# Patient Record
Sex: MALE | Race: WHITE | NOT HISPANIC OR LATINO | Employment: FULL TIME | URBAN - METROPOLITAN AREA
[De-identification: names, ages, dates, MRNs, and addresses within clinical notes are randomized per-mention and may not be internally consistent; named-entity substitution may affect disease eponyms.]

---

## 2017-01-04 ENCOUNTER — OFFICE VISIT (OUTPATIENT)
Dept: CARDIOLOGY | Facility: MEDICAL CENTER | Age: 57
End: 2017-01-04
Payer: COMMERCIAL

## 2017-01-04 VITALS
HEART RATE: 76 BPM | SYSTOLIC BLOOD PRESSURE: 120 MMHG | BODY MASS INDEX: 33.07 KG/M2 | WEIGHT: 231 LBS | DIASTOLIC BLOOD PRESSURE: 82 MMHG | HEIGHT: 70 IN

## 2017-01-04 DIAGNOSIS — F41.9 ANXIETY: ICD-10-CM

## 2017-01-04 DIAGNOSIS — E66.9 OBESITY (BMI 30-39.9): ICD-10-CM

## 2017-01-04 DIAGNOSIS — I25.10 CORONARY ARTERY DISEASE INVOLVING NATIVE CORONARY ARTERY OF NATIVE HEART WITHOUT ANGINA PECTORIS: Chronic | ICD-10-CM

## 2017-01-04 DIAGNOSIS — E78.2 MIXED HYPERLIPIDEMIA: ICD-10-CM

## 2017-01-04 PROCEDURE — 99214 OFFICE O/P EST MOD 30 MIN: CPT | Performed by: INTERNAL MEDICINE

## 2017-01-04 RX ORDER — LOSARTAN POTASSIUM 25 MG/1
25 TABLET ORAL DAILY
Qty: 30 TAB | Refills: 11 | Status: SHIPPED | OUTPATIENT
Start: 2017-01-04 | End: 2017-02-21 | Stop reason: SDUPTHER

## 2017-01-04 NOTE — PROGRESS NOTES
Subjective:   Yves Ivory is a 55 y.o. male who presents today for all up after STEMI complicated by Arrest.    Hx of anxiety, HLD, obesity, HTN, anxiety, depression, smoking hx, and now CAD s/p STEMI with DENISSE to OM, VF arrest.    He is presented to the emergency department a couple of times for musculoskeletal chest pain and gas pains one of which and left with a stress test showing infarct no ischemia and a repeat echo which was unchanged and normal.    He is active and we discussed many insightful questions today.    He has had his Lipitor cut in half for myalgias, and was taken off his lisinopril for cough but not did not have it replaced by appropriate replacement medication. By his PCP.    He has had no episodes of chest pain, shortness of breath, orthopnea, or peripheral edema.    Past Medical History   Diagnosis Date   • Hyperlipidemia    • Obesity    • Hypertension    • CAD (coronary artery disease)      STEMI-S/P DENISSE to OM   • Depression    • Anxiety    • Ventricular tachycardia (HCC)      post STEMI      Past Surgical History   Procedure Laterality Date   • Cardiac cath     • Angioplasty     • Appendectomy child       No family history on file.  History   Smoking status   • Current Every Day Smoker -- 40 years   • Types: Cigars   Smokeless tobacco   • Never Used     Allergies   Allergen Reactions   • Brilinta [Ticagrelor] Shortness of Breath     Med put patient in ER twice     Outpatient Encounter Prescriptions as of 1/4/2017   Medication Sig Dispense Refill   • Coenzyme Q10 (CO Q 10 PO) Take  by mouth every day.     • losartan (COZAAR) 25 MG Tab Take 1 Tab by mouth every day. 30 Tab 11   • lorazepam (ATIVAN) 1 MG Tab Take 1 Tab by mouth 2 times a day as needed for Anxiety. 60 Tab 2   • ranitidine (ZANTAC) 150 MG Tab Take 1 Tab by mouth 2 times a day. 60 Tab 3   • magnesium oxide (MAG-OX) 400 (241.3 MG) MG Tab tablet Take 1 Tab by mouth every day. 15 Tab 0   • acetaminophen (TYLENOL) 500 MG Tab Take  "500-1,000 mg by mouth every 6 hours as needed.     • atorvastatin (LIPITOR) 80 MG tablet Take 40 mg by mouth every evening.     • metoprolol (LOPRESSOR) 25 MG Tab Take 0.5 Tabs by mouth 2 times a day. 90 Tab 3   • [DISCONTINUED] lisinopril (PRINIVIL) 5 MG Tab Take 1 Tab by mouth every day. 90 Tab 3   • clopidogrel (PLAVIX) 75 MG Tab Take 1 Tab by mouth every day. 90 Tab 3   • aspirin EC 81 MG EC tablet Take 1 Tab by mouth every day. 30 Tab 0     No facility-administered encounter medications on file as of 1/4/2017.     ROS     Objective:   /82 mmHg  Pulse 76  Ht 1.778 m (5' 10\")  Wt 104.781 kg (231 lb)  BMI 33.15 kg/m2    Physical Exam   Constitutional: He is oriented to person, place, and time. He appears well-developed and well-nourished. No distress.   HENT:   Head: Normocephalic and atraumatic.   Mouth/Throat: Oropharynx is clear and moist.   Eyes: Conjunctivae and EOM are normal. Pupils are equal, round, and reactive to light. No scleral icterus.   Neck: Normal range of motion. No JVD present. No tracheal deviation present. No thyromegaly present.   Cardiovascular: Normal rate, regular rhythm, S1 normal, normal heart sounds and intact distal pulses.  PMI is not displaced.  Exam reveals no gallop and no friction rub.    No murmur heard.  Pulses:       Carotid pulses are 2+ on the right side, and 2+ on the left side.       Radial pulses are 2+ on the right side, and 2+ on the left side.        Dorsalis pedis pulses are 2+ on the right side, and 2+ on the left side.        Posterior tibial pulses are 2+ on the right side, and 2+ on the left side.   Pulmonary/Chest: Effort normal and breath sounds normal. No respiratory distress. He has no wheezes. He has no rales.   Abdominal: Soft. Bowel sounds are normal. He exhibits no distension and no pulsatile midline mass. There is no tenderness. There is no guarding.   Musculoskeletal: Normal range of motion. He exhibits no edema.   Neurological: He is alert and " oriented to person, place, and time. No cranial nerve deficit.   Skin: Skin is warm and dry. No rash noted. He is not diaphoretic. No erythema.   Psychiatric: His behavior is normal. Thought content normal. His mood appears anxious.   Jittery and restless in exam   Nursing note and vitals reviewed.    7/7/16 ANGIOGRAM POSTPROCEDURE DIAGNOSES:  1.  Coronary artery disease including occluded proximal obtuse marginal    branch, additional nonobstructive mid left anterior descending artery    stenosis.  2.  Successful thrombectomy/PTCA/stent placement of the proximal to mid obtuse   marginal branch with 3.0x32-mm Promus PREMIER drug-eluting stent.  3.  Angio-Seal closure.    7/8/16 ECHO CONCLUSIONS  Left ventricular ejection fraction is visually estimated to be 55%,   inferior hypokinesis.  Grade II-III diastolic dysfunction.  Mild concentric left ventricular   hypertrophy.  Dilated inferior vena cava with inspiratory collapse.  No significant valve disease or flow abnormalities.   No prior study is available for comparison.     Lab Results   Component Value Date/Time    WBC 8.6 10/06/2016 02:20 PM    RBC 5.26 10/06/2016 02:20 PM    HEMOGLOBIN 16.0 10/06/2016 02:20 PM    HEMATOCRIT 45.9 10/06/2016 02:20 PM    MCV 87.3 10/06/2016 02:20 PM    MCH 30.4 10/06/2016 02:20 PM    MCHC 34.9 10/06/2016 02:20 PM    MPV 9.6 10/06/2016 02:20 PM      Lab Results   Component Value Date/Time    CHOLESTEROL, 08/17/2016 07:09 AM    LDL 71 08/17/2016 07:09 AM    HDL 40 08/17/2016 07:09 AM    TRIGLYCERIDES 164* 08/17/2016 07:09 AM       Lab Results   Component Value Date/Time    SODIUM 138 10/06/2016 02:20 PM    POTASSIUM 3.7 10/06/2016 02:20 PM    CHLORIDE 104 10/06/2016 02:20 PM    CO2 22 10/06/2016 02:20 PM    GLUCOSE 90 10/06/2016 02:20 PM    BUN 18 10/06/2016 02:20 PM    CREATININE 0.78 10/06/2016 02:20 PM     Lab Results   Component Value Date/Time    ALKALINE PHOSPHATASE 67 10/06/2016 02:20 PM    AST(SGOT) 21 10/06/2016 02:20  PM    ALT(SGPT) 49 10/06/2016 02:20 PM    TOTAL BILIRUBIN 1.0 10/06/2016 02:20 PM      Lab Results   Component Value Date/Time    B NATRIURETIC PEPTIDE 6 10/06/2016 02:20 PM      Assessment:     1. CAD s/p DENISSE OM 7/7/2016  losartan (COZAAR) 25 MG Tab    STEMI  VF Arrest   2. Mixed hyperlipidemia     3. Anxiety     4. Obesity (BMI 30-39.9)         Medical Decision Making:  Today's Assessment / Status / Plan:     Doing well. No cardiac symptoms. Mild residual nonobstructive CAD. Needs optimal medical therapy. He's gained weight. Recommend diet and lifestyle for 3 months and if he makes good progress a check of his lipid profile. Recommend an ARB exacerbation for his ACEI due to perivascular benefits after MI. He needs one year of dual antiplatelet therapy. He is tolerating this well.    1. Losartan 25 mg daily  2. Continue other medical therapy  3. Follow-up in 3 months with Alix Khan with a repeat lipid profile  4. Diet activity and lifestyle changes were discussed  5. Anxiety management

## 2017-01-04 NOTE — MR AVS SNAPSHOT
"        Yves Ivory   2017 7:45 AM   Office Visit   MRN: [id number]    Department:  [facility]   Dept Phone:  [phone]    Description:  Male : 1960   Provider:  Federico Bianchi M.D.           Reason for Visit     Follow-Up           Allergies as of 2017     Allergen Noted Reactions    Brilinta [Ticagrelor] 2016   Shortness of Breath    Med put patient in ER twice      You were diagnosed with     Coronary artery disease involving native coronary artery of native heart without angina pectoris   [6004842]   STEMI  VF Arrest    Mixed hyperlipidemia   [272.2.ICD-9-CM]       Anxiety   [580074]       Obesity (BMI 30-39.9)   [921442]         Vital Signs     Blood Pressure Pulse Height Weight Body Mass Index Smoking Status    120/82 mmHg 76 1.778 m (5' 10\") 104.781 kg (231 lb) 33.15 kg/m2 Current Every Day Smoker      Basic Information     Date Of Birth Sex Race Ethnicity Preferred Language    1960 Male White Non- English      Problem List              ICD-10-CM Priority Class Noted - Resolved    STEMI (ST elevation myocardial infarction) (HCC) I21.3 Medium  2016 - Present    Research study patient Z00.6 Low  2016 - Present    Coronary artery disease involving native coronary artery of native heart without angina pectoris I25.10 Medium  2016 - Present    HLD (hyperlipidemia) E78.5 Medium  2016 - Present    VT (ventricular tachycardia) ([facility]) I47.2 Medium  2016 - Present    S/P coronary artery stent placement Z95.5 Medium  2016 - Present    Cigar smoker F17.290 Low  2016 - Present    Anxiety F41.9 Low  2016 - Present    Chest pain R07.9   10/6/2016 - Present    Abdominal gas pain R14.1   2016 - Present    Preventative health care Z00.00   2016 - Present    Obesity (BMI 30-39.9) E66.9   2016 - Present    Cough R05   2016 - Present      Health Maintenance        Date Due Completion Dates    IMM DTaP/Tdap/Td " Vaccine (1 - Tdap) 7/28/1981 ---    IMM PNEUMOCOCCAL 19-64 (ADULT) MEDIUM RISK SERIES (1 of 1 - PPSV23) 7/28/1981 ---    COLONOSCOPY 7/28/2012 ---            Current Immunizations     Influenza Vaccine Quad Inj (Pf) 10/3/2016      Below and/or attached are the medications your provider expects you to take. Review all of your home medications and newly ordered medications with your provider and/or pharmacist. Follow medication instructions as directed by your provider and/or pharmacist. Please keep your medication list with you and share with your provider. Update the information when medications are discontinued, doses are changed, or new medications (including over-the-counter products) are added; and carry medication information at all times in the event of emergency situations     Allergies:  BRILINTA - Shortness of Breath               Medications  Valid as of: January 04, 2017 -  8:18 AM    Generic Name Brand Name Tablet Size Instructions for use    Acetaminophen (Tab) TYLENOL 500 MG Take 500-1,000 mg by mouth every 6 hours as needed.        Aspirin (Tablet Delayed Response) aspirin 81 MG Take 1 Tab by mouth every day.        Atorvastatin Calcium (Tab) LIPITOR 80 MG Take 40 mg by mouth every evening.        Clopidogrel Bisulfate (Tab) PLAVIX 75 MG Take 1 Tab by mouth every day.        Coenzyme Q10   Take  by mouth every day.        LORazepam (Tab) ATIVAN 1 MG Take 1 Tab by mouth 2 times a day as needed for Anxiety.        Losartan Potassium (Tab) COZAAR 25 MG Take 1 Tab by mouth every day.        Magnesium Oxide (Tab) MAG- (241.3 MG) MG Take 1 Tab by mouth every day.        Metoprolol Tartrate (Tab) LOPRESSOR 25 MG Take 0.5 Tabs by mouth 2 times a day.        RaNITidine HCl (Tab) ZANTAC 150 MG Take 1 Tab by mouth 2 times a day.        .                 Medicines prescribed today were sent to:     [id number] - [place], [place] - [address] [address]    [address] [place] [address] [place] [address]    Phone:  [phone] Fax: [phone]    Open 24 Hours?: No      Medication refill instructions:       If your prescription bottle indicates you have medication refills left, it is not necessary to call your provider’s office. Please contact your pharmacy and they will refill your medication.    If your prescription bottle indicates you do not have any refills left, you may request refills at any time through one of the following ways: The online [employer] system (except Urgent Care), by calling your provider’s office, or by asking your pharmacy to contact your provider’s office with a refill request. Medication refills are processed only during regular business hours and may not be available until the next business day. Your provider may request additional information or to have a follow-up visit with you prior to refilling your medication.   *Please Note: Medication refills are assigned a new Rx number when refilled electronically. Your pharmacy may indicate that no refills were authorized even though a new prescription for the same medication is available at the pharmacy. Please request the medicine by name with the pharmacy before contacting your provider for a refill.           [employer] Access Code: Activation code not generated  Current [employer] Status: Active

## 2017-01-06 ENCOUNTER — TELEPHONE (OUTPATIENT)
Dept: CARDIOLOGY | Facility: MEDICAL CENTER | Age: 57
End: 2017-01-06

## 2017-01-06 NOTE — TELEPHONE ENCOUNTER
Patient saw Dr. Bianchi yesterday 1/4/17.  He is being scheduled for a colonoscopy  And needs surgery clearance.  [facility] Clear[facility].  Patient may hold his Plavix 5 days prior but may NOT stop aspirin per Dr. Bianchi.  Surgical clearance letter in chart. david

## 2017-01-30 DIAGNOSIS — R14.1 ABDOMINAL GAS PAIN: ICD-10-CM

## 2017-01-30 RX ORDER — RANITIDINE 150 MG/1
150 TABLET ORAL 2 TIMES DAILY
Qty: 180 TAB | Refills: 0 | Status: SHIPPED | OUTPATIENT
Start: 2017-01-30 | End: 2017-04-05 | Stop reason: SDUPTHER

## 2017-01-30 NOTE — TELEPHONE ENCOUNTER
Was the patient seen in the last year in this department? Yes     Does patient have an active prescription for medications requested? Yes     Received Request Via: Pharmacy-90 day supply

## 2017-02-21 DIAGNOSIS — I25.10 CORONARY ARTERY DISEASE INVOLVING NATIVE CORONARY ARTERY OF NATIVE HEART WITHOUT ANGINA PECTORIS: Chronic | ICD-10-CM

## 2017-02-21 RX ORDER — LOSARTAN POTASSIUM 25 MG/1
25 TABLET ORAL DAILY
Qty: 90 TAB | Refills: 3 | OUTPATIENT
Start: 2017-02-21 | End: 2017-04-05

## 2017-03-24 PROBLEM — Z00.6 ENCOUNTER FOR EXAMINATION FOR NORMAL COMPARISON AND CONTROL IN CLINICAL RESEARCH PROGRAM: Status: ACTIVE | Noted: 2017-03-24

## 2017-04-05 ENCOUNTER — OFFICE VISIT (OUTPATIENT)
Dept: CARDIOLOGY | Facility: MEDICAL CENTER | Age: 57
End: 2017-04-05
Payer: COMMERCIAL

## 2017-04-05 VITALS
HEIGHT: 70 IN | HEART RATE: 70 BPM | OXYGEN SATURATION: 94 % | BODY MASS INDEX: 32.64 KG/M2 | WEIGHT: 228 LBS | SYSTOLIC BLOOD PRESSURE: 130 MMHG | DIASTOLIC BLOOD PRESSURE: 96 MMHG

## 2017-04-05 DIAGNOSIS — E66.9 OBESITY (BMI 30-39.9): ICD-10-CM

## 2017-04-05 DIAGNOSIS — I25.10 CORONARY ARTERY DISEASE INVOLVING NATIVE CORONARY ARTERY OF NATIVE HEART WITHOUT ANGINA PECTORIS: ICD-10-CM

## 2017-04-05 DIAGNOSIS — I21.29 ST ELEVATION MYOCARDIAL INFARCTION (STEMI) INVOLVING OTHER CORONARY ARTERY (HCC): ICD-10-CM

## 2017-04-05 DIAGNOSIS — E78.2 MIXED HYPERLIPIDEMIA: ICD-10-CM

## 2017-04-05 DIAGNOSIS — I47.20 VT (VENTRICULAR TACHYCARDIA) (HCC): ICD-10-CM

## 2017-04-05 DIAGNOSIS — R14.1 ABDOMINAL GAS PAIN: ICD-10-CM

## 2017-04-05 PROCEDURE — 99214 OFFICE O/P EST MOD 30 MIN: CPT | Performed by: NURSE PRACTITIONER

## 2017-04-05 RX ORDER — LISINOPRIL 5 MG/1
5 TABLET ORAL DAILY
Qty: 90 TAB | Refills: 3 | Status: SHIPPED | OUTPATIENT
Start: 2017-04-05 | End: 2017-08-28 | Stop reason: SDUPTHER

## 2017-04-05 RX ORDER — LISINOPRIL 5 MG/1
5 TABLET ORAL DAILY
COMMUNITY
End: 2017-04-05 | Stop reason: SDUPTHER

## 2017-04-05 RX ORDER — CALCIUM CARBONATE 500 MG/1
2000 TABLET, CHEWABLE ORAL DAILY
COMMUNITY
End: 2020-03-27

## 2017-04-05 RX ORDER — ATORVASTATIN CALCIUM 40 MG/1
40 TABLET, FILM COATED ORAL EVERY EVENING
Qty: 90 TAB | Refills: 3 | COMMUNITY
Start: 2017-04-05 | End: 2017-11-02

## 2017-04-05 RX ORDER — RANITIDINE 150 MG/1
150 TABLET ORAL PRN
Qty: 90 TAB | Refills: 3 | COMMUNITY
Start: 2017-04-05 | End: 2018-04-25

## 2017-04-05 ASSESSMENT — ENCOUNTER SYMPTOMS
MYALGIAS: 0
DIZZINESS: 0
DEPRESSION: 1
PND: 0
FEVER: 0
PALPITATIONS: 0
NERVOUS/ANXIOUS: 1
ORTHOPNEA: 0
ABDOMINAL PAIN: 0
SHORTNESS OF BREATH: 0
COUGH: 0
CLAUDICATION: 0

## 2017-04-05 NOTE — Clinical Note
[facility]   [address]  [place] [address]  Phone: [phone]  Fax: [phone]              Yves Ivory  1960    Encounter Date: 4/5/2017    BULL [facility]          PROGRESS NOTE:  Subjective:   Yves Ivory is a 55 y.o. male who presents today for 3 month follow up.    He is a patient of Dr. Bianchi in our office. Hx of HLD, obesity, HTN, anxiety/depression, smoking hx (now quit), and CAD with STEMI with DENISSE to OM in '16.    He is doing much better. He realized that he had some anxiety issues after the hospital and is now doing well with management with his PCP. He has stopped smoking completely, even the vapor.    He is working on healthier lifestyle choices as well.     She has had no episodes of chest pain, palpitations, dizziness/lightheadedness, shortness of breath, orthopnea, or peripheral edema.    Past Medical History   Diagnosis Date   • Hyperlipidemia    • Obesity    • Hypertension    • CAD (coronary artery disease)      STEMI-S/P DENISSE to OM   • Depression    • Anxiety    • Ventricular tachycardia (CMS-HCC)      post STEMI      Past Surgical History   Procedure Laterality Date   • Cardiac cath     • Angioplasty     • Appendectomy child       History reviewed. No pertinent family history.  History   Smoking status   • Former Smoker -- 40 years   • Types: Cigars   Smokeless tobacco   • Never Used     Comment: Occasional cigar     Allergies   Allergen Reactions   • Brilinta [Ticagrelor] Shortness of Breath     Med put patient in ER twice     Outpatient Encounter Prescriptions as of 4/5/2017   Medication Sig Dispense Refill   • calcium carbonate (TUMS) 500 MG Chew Tab Take 2,000 mg by mouth every day.     • ranitidine (ZANTAC) 150 MG Tab Take 1 Tab by mouth as needed for Heartburn. 90 Tab 3   • lisinopril (PRINIVIL) 5 MG Tab Take 1 Tab by mouth every day. 90 Tab 3   • atorvastatin (LIPITOR) 40 MG Tab Take  "1 Tab by mouth every evening. 90 Tab 3   • magnesium oxide (MAG-OX) 400 (241.3 MG) MG Tab tablet Take 1 Tab by mouth as needed. 90 Tab 3   • Coenzyme Q10 (CO Q 10 PO) Take  by mouth every day.     • lorazepam (ATIVAN) 1 MG Tab Take 1 Tab by mouth 2 times a day as needed for Anxiety. 60 Tab 2   • acetaminophen (TYLENOL) 500 MG Tab Take 500-1,000 mg by mouth every 6 hours as needed.     • metoprolol (LOPRESSOR) 25 MG Tab Take 0.5 Tabs by mouth 2 times a day. 90 Tab 3   • clopidogrel (PLAVIX) 75 MG Tab Take 1 Tab by mouth every day. 90 Tab 3   • aspirin EC 81 MG EC tablet Take 1 Tab by mouth every day. 30 Tab 0   • [DISCONTINUED] lisinopril (PRINIVIL) 5 MG Tab Take 5 mg by mouth every day.     • [DISCONTINUED] losartan (COZAAR) 25 MG Tab Take 1 Tab by mouth every day. 90 Tab 3   • [DISCONTINUED] ranitidine (ZANTAC) 150 MG Tab Take 1 Tab by mouth 2 times a day. (Patient taking differently: Take 150 mg by mouth as needed.) 180 Tab 0   • [DISCONTINUED] magnesium oxide (MAG-OX) 400 (241.3 MG) MG Tab tablet Take 1 Tab by mouth every day. (Patient taking differently: Take 400 mg by mouth as needed.) 15 Tab 0   • [DISCONTINUED] atorvastatin (LIPITOR) 80 MG tablet Take 40 mg by mouth every evening. ALTERNATING WITH 40 MG (DUE TO MUSCLE ACHES)       No facility-administered encounter medications on file as of 4/5/2017.     Review of Systems   Constitutional: Negative for fever and malaise/fatigue.   Respiratory: Negative for cough and shortness of breath.    Cardiovascular: Negative for chest pain, palpitations, orthopnea, claudication, leg swelling and PND.   Gastrointestinal: Negative for abdominal pain.   Musculoskeletal: Negative for myalgias.   Neurological: Negative for dizziness.   Psychiatric/Behavioral: Positive for depression. The patient is nervous/anxious.    All other systems reviewed and are negative.       Objective:   /96 mmHg  Pulse 70  Ht 1.778 m (5' 10\")  Wt 103.42 kg (228 lb)  BMI 32.71 kg/m2  " SpO2 94%    Physical Exam   Constitutional: He is oriented to person, place, and time. He appears well-developed and well-nourished. No distress.   HENT:   Head: Normocephalic and atraumatic.   Eyes: EOM are normal.   Neck: Normal range of motion. No JVD present.   Cardiovascular: Normal rate, regular rhythm, normal heart sounds and intact distal pulses.    No murmur heard.  Pulmonary/Chest: Effort normal and breath sounds normal. No respiratory distress. He has no wheezes. He has no rales.   Abdominal: Soft. Bowel sounds are normal.   Musculoskeletal: Normal range of motion. He exhibits no edema.   Neurological: He is alert and oriented to person, place, and time.   Skin: Skin is warm and dry.   Psychiatric: He has a normal mood and affect. His mood appears not anxious.   Nursing note and vitals reviewed.    7/7/16 ANGIOGRAM POSTPROCEDURE DIAGNOSES:  1.  Coronary artery disease including occluded proximal obtuse marginal    branch, additional nonobstructive mid left anterior descending artery    stenosis.  2.  Successful thrombectomy/PTCA/stent placement of the proximal to mid obtuse   marginal branch with 3.0x32-mm Promus PREMIER drug-eluting stent.  3.  Angio-Seal closure.    7/8/16 ECHO CONCLUSIONS  Left ventricular ejection fraction is visually estimated to be 55%,   inferior hypokinesis.  Grade II-III diastolic dysfunction.  Mild concentric left ventricular   hypertrophy.  Dilated inferior vena cava with inspiratory collapse.  No significant valve disease or flow abnormalities.   No prior study is available for comparison.     12/2016 ECHO CONCLUSIONS  Left ventricular ejection fraction is visually estimated to be 65%.  Grade 1 diastolic dysfunction.   Normal right ventricular systolic function.  Mild mitral regurgitation.  Compared to the images of the prior study done 10/7/2016 -  there has   been no significant change.     Lab Results   Component Value Date/Time    WBC 8.6 10/06/2016 02:20 PM    RBC 5.26  10/06/2016 02:20 PM    HEMOGLOBIN 16.0 10/06/2016 02:20 PM    HEMATOCRIT 45.9 10/06/2016 02:20 PM    MCV 87.3 10/06/2016 02:20 PM    MCH 30.4 10/06/2016 02:20 PM    MCHC 34.9 10/06/2016 02:20 PM    MPV 9.6 10/06/2016 02:20 PM      Lab Results   Component Value Date/Time    CHOLESTEROL, 08/17/2016 07:09 AM    LDL 71 08/17/2016 07:09 AM    HDL 40 08/17/2016 07:09 AM    TRIGLYCERIDES 164* 08/17/2016 07:09 AM       Lab Results   Component Value Date/Time    SODIUM 138 10/06/2016 02:20 PM    POTASSIUM 3.7 10/06/2016 02:20 PM    CHLORIDE 104 10/06/2016 02:20 PM    CO2 22 10/06/2016 02:20 PM    GLUCOSE 90 10/06/2016 02:20 PM    BUN 18 10/06/2016 02:20 PM    CREATININE 0.78 10/06/2016 02:20 PM     Lab Results   Component Value Date/Time    ALKALINE PHOSPHATASE 67 10/06/2016 02:20 PM    AST(SGOT) 21 10/06/2016 02:20 PM    ALT(SGPT) 49 10/06/2016 02:20 PM    TOTAL BILIRUBIN 1.0 10/06/2016 02:20 PM      Lab Results   Component Value Date/Time    B NATRIURETIC PEPTIDE 6 10/06/2016 02:20 PM      Assessment:     1. Coronary artery disease involving native coronary artery of native heart without angina pectoris  lisinopril (PRINIVIL) 5 MG Tab   2. Mixed hyperlipidemia  LIPID PANEL    COMP METABOLIC PANEL   3. Obesity (BMI 30-39.9)     4. VT (ventricular tachycardia) (CMS-HCC)     5. ST elevation myocardial infarction (STEMI) involving other coronary artery (CMS-HCC)     6. Abdominal gas pain  ranitidine (ZANTAC) 150 MG Tab     Medical Decision Making:  Today's Assessment / Status / Plan:     1. CAD with STEMI with VT arrest and DENISSE placement to  in '16 (residual non-obstructive disease in LAD), Continue ASA 81 mg, plavix (1 year min), Lipitor 40 mg QPM (unable to tolerate 80 mg due to myalgias), lisinopril 5 mg,and metoprolol 12.5 mg BID.     2. HLD, Lipitor 40 mg. Myalgias with higher dosing. LDL goal <70, near goal. FU with CMP and lipid before next FU apt.    3. VT, one remote episode with STEMI. No recurrence. Follow  clinically.    FU in clinic in 6 months with TW with review of yearly CMP and lipid    Patient verbalizes understanding and agrees with the plan of care.     Collaborating MD: Loco DESAI    Spent 35 minutes in face-to-face patient contact in which greater than 50% of the visit was spent in counseling/coordination of care of medication management, symptom management, list of PCP, re-assurance, discussion of coronary disease, plan of care, updated med list accordingly, labs ordered.        No Recipients

## 2017-04-05 NOTE — MR AVS SNAPSHOT
"        Yves Ivory   2017 9:00 AM   Office Visit   MRN: [id number]    Department:  [facility]   Dept Phone:  [phone]    Description:  Male : 1960   Provider:  BULL Virgen           Reason for Visit     Follow-Up           Allergies as of 2017     Allergen Noted Reactions    Brilinta [Ticagrelor] 2016   Shortness of Breath    Med put patient in ER twice      You were diagnosed with     Coronary artery disease involving native coronary artery of native heart without angina pectoris   [1639017]       Mixed hyperlipidemia   [272.2.ICD-9-CM]       Obesity (BMI 30-39.9)   [524494]       VT (ventricular tachycardia) (CMS-HCC)   [491377]       ST elevation myocardial infarction (STEMI) involving other coronary artery (CMS-HCC)   [4981801]       Abdominal gas pain   [384803]         Vital Signs     Blood Pressure Pulse Height Weight Body Mass Index Oxygen Saturation    130/96 mmHg 70 1.778 m (5' 10\") 103.42 kg (228 lb) 32.71 kg/m2 94%    Smoking Status                   Former Smoker           Basic Information     Date Of Birth Sex Race Ethnicity Preferred Language    1960 Male White Non- English      Your appointments     Oct 11, 2017  4:00 PM   FOLLOW UP with Federico Bianchi M.D.   [facility] (--)    [address], [address]  [place] [address]   [phone]              Problem List              ICD-10-CM Priority Class Noted - Resolved    STEMI (ST elevation myocardial infarction) (CMS-HCC) I21.3 Medium  2016 - Present    Research study patient Z00.6 Low  2016 - Present    Coronary artery disease involving native coronary artery of native heart without angina pectoris I25.10 Medium  2016 - Present    HLD (hyperlipidemia) E78.5 Medium  2016 - Present    VT (ventricular tachycardia) (CMS-HCC) I47.2 Medium  2016 - Present    Cigar smoker F17.290 Low  2016 - Present    Anxiety " F41.9 Low  8/22/2016 - Present    Abdominal gas pain R14.1 Low  12/12/2016 - Present    Preventative health care Z00.00 Low  12/12/2016 - Present    Obesity (BMI 30-39.9) E66.9 Medium  12/12/2016 - Present    Cough R05 Low  12/12/2016 - Present    Encounter for examination for normal comparison and control in clinical research program Z00.6 Low  3/24/2017 - Present      Health Maintenance        Date Due Completion Dates    IMM DTaP/Tdap/Td Vaccine (1 - Tdap) 7/28/1981 ---    IMM PNEUMOCOCCAL 19-64 (ADULT) MEDIUM RISK SERIES (1 of 1 - PPSV23) 7/28/1981 ---    COLONOSCOPY 7/28/2012 ---            Current Immunizations     Influenza Vaccine Quad Inj (Pf) 10/3/2016      Below and/or attached are the medications your provider expects you to take. Review all of your home medications and newly ordered medications with your provider and/or pharmacist. Follow medication instructions as directed by your provider and/or pharmacist. Please keep your medication list with you and share with your provider. Update the information when medications are discontinued, doses are changed, or new medications (including over-the-counter products) are added; and carry medication information at all times in the event of emergency situations     Allergies:  BRILINTA - Shortness of Breath               Medications  Valid as of: April 05, 2017 -  9:15 AM    Generic Name Brand Name Tablet Size Instructions for use    Acetaminophen (Tab) TYLENOL 500 MG Take 500-1,000 mg by mouth every 6 hours as needed.        Aspirin (Tablet Delayed Response) aspirin 81 MG Take 1 Tab by mouth every day.        Atorvastatin Calcium (Tab) LIPITOR 40 MG Take 1 Tab by mouth every evening.        Calcium Carbonate Antacid (Chew Tab) TUMS 500 MG Take 2,000 mg by mouth every day.        Clopidogrel Bisulfate (Tab) PLAVIX 75 MG Take 1 Tab by mouth every day.        Coenzyme Q10   Take  by mouth every day.        Lisinopril (Tab) PRINIVIL 5 MG Take 1 Tab by mouth every  day.        LORazepam (Tab) ATIVAN 1 MG Take 1 Tab by mouth 2 times a day as needed for Anxiety.        Magnesium Oxide (Tab) MAG- (241.3 MG) MG Take 1 Tab by mouth as needed.        Metoprolol Tartrate (Tab) LOPRESSOR 25 MG Take 0.5 Tabs by mouth 2 times a day.        RaNITidine HCl (Tab) ZANTAC 150 MG Take 1 Tab by mouth as needed for Heartburn.        .                 Medicines prescribed today were sent to:     [id number] - [place], [place] - [address] [place] [address]    [address] [facility]. [place] [address]    Phone: [phone] Fax: [phone]    Open 24 Hours?: No      Medication refill instructions:       If your prescription bottle indicates you have medication refills left, it is not necessary to call your provider’s office. Please contact your pharmacy and they will refill your medication.    If your prescription bottle indicates you do not have any refills left, you may request refills at any time through one of the following ways: The online FoxyP2 system (except Urgent Care), by calling your provider’s office, or by asking your pharmacy to contact your provider’s office with a refill request. Medication refills are processed only during regular business hours and may not be available until the next business day. Your provider may request additional information or to have a follow-up visit with you prior to refilling your medication.   *Please Note: Medication refills are assigned a new Rx number when refilled electronically. Your pharmacy may indicate that no refills were authorized even though a new prescription for the same medication is available at the pharmacy. Please request the medicine by name with the pharmacy before contacting your provider for a refill.        Your To Do List     Future Labs/Procedures Complete By Expires    COMP METABOLIC PANEL  As directed 4/6/2018         FoxyP2 Access Code: Activation code not generated  Current FoxyP2 Status: Active

## 2017-04-05 NOTE — PROGRESS NOTES
Subjective:   Yves Ivory is a 55 y.o. male who presents today for 3 month follow up.    He is a patient of Dr. Bianchi in our office. Hx of HLD, obesity, HTN, anxiety/depression, smoking hx (now quit), and CAD with STEMI with DENISSE to OM in '16.    He is doing much better. He realized that he had some anxiety issues after the hospital and is now doing well with management with his PCP. He has stopped smoking completely, even the vapor.    He is working on healthier lifestyle choices as well.     She has had no episodes of chest pain, palpitations, dizziness/lightheadedness, shortness of breath, orthopnea, or peripheral edema.    Past Medical History   Diagnosis Date   • Hyperlipidemia    • Obesity    • Hypertension    • CAD (coronary artery disease)      STEMI-S/P DENISSE to OM   • Depression    • Anxiety    • Ventricular tachycardia (CMS-HCC)      post STEMI      Past Surgical History   Procedure Laterality Date   • Cardiac cath     • Angioplasty     • Appendectomy child       History reviewed. No pertinent family history.  History   Smoking status   • Former Smoker -- 40 years   • Types: Cigars   Smokeless tobacco   • Never Used     Comment: Occasional cigar     Allergies   Allergen Reactions   • Brilinta [Ticagrelor] Shortness of Breath     Med put patient in ER twice     Outpatient Encounter Prescriptions as of 4/5/2017   Medication Sig Dispense Refill   • calcium carbonate (TUMS) 500 MG Chew Tab Take 2,000 mg by mouth every day.     • ranitidine (ZANTAC) 150 MG Tab Take 1 Tab by mouth as needed for Heartburn. 90 Tab 3   • lisinopril (PRINIVIL) 5 MG Tab Take 1 Tab by mouth every day. 90 Tab 3   • atorvastatin (LIPITOR) 40 MG Tab Take 1 Tab by mouth every evening. 90 Tab 3   • magnesium oxide (MAG-OX) 400 (241.3 MG) MG Tab tablet Take 1 Tab by mouth as needed. 90 Tab 3   • Coenzyme Q10 (CO Q 10 PO) Take  by mouth every day.     • lorazepam (ATIVAN) 1 MG Tab Take 1 Tab by mouth 2 times a day as needed for  "Anxiety. 60 Tab 2   • acetaminophen (TYLENOL) 500 MG Tab Take 500-1,000 mg by mouth every 6 hours as needed.     • metoprolol (LOPRESSOR) 25 MG Tab Take 0.5 Tabs by mouth 2 times a day. 90 Tab 3   • clopidogrel (PLAVIX) 75 MG Tab Take 1 Tab by mouth every day. 90 Tab 3   • aspirin EC 81 MG EC tablet Take 1 Tab by mouth every day. 30 Tab 0   • [DISCONTINUED] lisinopril (PRINIVIL) 5 MG Tab Take 5 mg by mouth every day.     • [DISCONTINUED] losartan (COZAAR) 25 MG Tab Take 1 Tab by mouth every day. 90 Tab 3   • [DISCONTINUED] ranitidine (ZANTAC) 150 MG Tab Take 1 Tab by mouth 2 times a day. (Patient taking differently: Take 150 mg by mouth as needed.) 180 Tab 0   • [DISCONTINUED] magnesium oxide (MAG-OX) 400 (241.3 MG) MG Tab tablet Take 1 Tab by mouth every day. (Patient taking differently: Take 400 mg by mouth as needed.) 15 Tab 0   • [DISCONTINUED] atorvastatin (LIPITOR) 80 MG tablet Take 40 mg by mouth every evening. ALTERNATING WITH 40 MG (DUE TO MUSCLE ACHES)       No facility-administered encounter medications on file as of 4/5/2017.     Review of Systems   Constitutional: Negative for fever and malaise/fatigue.   Respiratory: Negative for cough and shortness of breath.    Cardiovascular: Negative for chest pain, palpitations, orthopnea, claudication, leg swelling and PND.   Gastrointestinal: Negative for abdominal pain.   Musculoskeletal: Negative for myalgias.   Neurological: Negative for dizziness.   Psychiatric/Behavioral: Positive for depression. The patient is nervous/anxious.    All other systems reviewed and are negative.       Objective:   /96 mmHg  Pulse 70  Ht 1.778 m (5' 10\")  Wt 103.42 kg (228 lb)  BMI 32.71 kg/m2  SpO2 94%    Physical Exam   Constitutional: He is oriented to person, place, and time. He appears well-developed and well-nourished. No distress.   HENT:   Head: Normocephalic and atraumatic.   Eyes: EOM are normal.   Neck: Normal range of motion. No JVD present. "   Cardiovascular: Normal rate, regular rhythm, normal heart sounds and intact distal pulses.    No murmur heard.  Pulmonary/Chest: Effort normal and breath sounds normal. No respiratory distress. He has no wheezes. He has no rales.   Abdominal: Soft. Bowel sounds are normal.   Musculoskeletal: Normal range of motion. He exhibits no edema.   Neurological: He is alert and oriented to person, place, and time.   Skin: Skin is warm and dry.   Psychiatric: He has a normal mood and affect. His mood appears not anxious.   Nursing note and vitals reviewed.    7/7/16 ANGIOGRAM POSTPROCEDURE DIAGNOSES:  1.  Coronary artery disease including occluded proximal obtuse marginal    branch, additional nonobstructive mid left anterior descending artery    stenosis.  2.  Successful thrombectomy/PTCA/stent placement of the proximal to mid obtuse   marginal branch with 3.0x32-mm Promus PREMIER drug-eluting stent.  3.  Angio-Seal closure.    7/8/16 ECHO CONCLUSIONS  Left ventricular ejection fraction is visually estimated to be 55%,   inferior hypokinesis.  Grade II-III diastolic dysfunction.  Mild concentric left ventricular   hypertrophy.  Dilated inferior vena cava with inspiratory collapse.  No significant valve disease or flow abnormalities.   No prior study is available for comparison.     12/2016 ECHO CONCLUSIONS  Left ventricular ejection fraction is visually estimated to be 65%.  Grade 1 diastolic dysfunction.   Normal right ventricular systolic function.  Mild mitral regurgitation.  Compared to the images of the prior study done 10/7/2016 -  there has   been no significant change.     Lab Results   Component Value Date/Time    WBC 8.6 10/06/2016 02:20 PM    RBC 5.26 10/06/2016 02:20 PM    HEMOGLOBIN 16.0 10/06/2016 02:20 PM    HEMATOCRIT 45.9 10/06/2016 02:20 PM    MCV 87.3 10/06/2016 02:20 PM    MCH 30.4 10/06/2016 02:20 PM    MCHC 34.9 10/06/2016 02:20 PM    MPV 9.6 10/06/2016 02:20 PM      Lab Results   Component Value  Date/Time    CHOLESTEROL, 08/17/2016 07:09 AM    LDL 71 08/17/2016 07:09 AM    HDL 40 08/17/2016 07:09 AM    TRIGLYCERIDES 164* 08/17/2016 07:09 AM       Lab Results   Component Value Date/Time    SODIUM 138 10/06/2016 02:20 PM    POTASSIUM 3.7 10/06/2016 02:20 PM    CHLORIDE 104 10/06/2016 02:20 PM    CO2 22 10/06/2016 02:20 PM    GLUCOSE 90 10/06/2016 02:20 PM    BUN 18 10/06/2016 02:20 PM    CREATININE 0.78 10/06/2016 02:20 PM     Lab Results   Component Value Date/Time    ALKALINE PHOSPHATASE 67 10/06/2016 02:20 PM    AST(SGOT) 21 10/06/2016 02:20 PM    ALT(SGPT) 49 10/06/2016 02:20 PM    TOTAL BILIRUBIN 1.0 10/06/2016 02:20 PM      Lab Results   Component Value Date/Time    B NATRIURETIC PEPTIDE 6 10/06/2016 02:20 PM      Assessment:     1. Coronary artery disease involving native coronary artery of native heart without angina pectoris  lisinopril (PRINIVIL) 5 MG Tab   2. Mixed hyperlipidemia  LIPID PANEL    COMP METABOLIC PANEL   3. Obesity (BMI 30-39.9)     4. VT (ventricular tachycardia) (CMS-HCC)     5. ST elevation myocardial infarction (STEMI) involving other coronary artery (CMS-HCC)     6. Abdominal gas pain  ranitidine (ZANTAC) 150 MG Tab     Medical Decision Making:  Today's Assessment / Status / Plan:     1. CAD with STEMI with VT arrest and DENISSE placement to  in '16 (residual non-obstructive disease in LAD), Continue ASA 81 mg, plavix (1 year min), Lipitor 40 mg QPM (unable to tolerate 80 mg due to myalgias), lisinopril 5 mg,and metoprolol 12.5 mg BID.     2. HLD, Lipitor 40 mg. Myalgias with higher dosing. LDL goal <70, near goal. FU with CMP and lipid before next FU apt.    3. VT, one remote episode with STEMI. No recurrence. Follow clinically.    FU in clinic in 6 months with TW with review of yearly CMP and lipid    Patient verbalizes understanding and agrees with the plan of care.     Collaborating MD: Loco DESAI    Spent 35 minutes in face-to-face patient contact in which greater than  50% of the visit was spent in counseling/coordination of care of medication management, symptom management, list of PCP, re-assurance, discussion of coronary disease, plan of care, updated med list accordingly, labs ordered.

## 2017-05-16 ENCOUNTER — TELEPHONE (OUTPATIENT)
Dept: CARDIOLOGY | Facility: MEDICAL CENTER | Age: 57
End: 2017-05-16

## 2017-05-17 NOTE — TELEPHONE ENCOUNTER
----- Message from Kelly White, Med Ass't sent at 5/15/2017  8:16 AM PDT -----  Regarding: FW: Non-Urgent Medical Question  Contact: [phone]      ----- Message -----     From: Yves Ivory     Sent: 5/12/2017  10:31 PM       To: Cait Kapoor/Kirby  Subject: Non-Urgent Medical Question                      Been taking 250 mg of Phazyme, any issues with my cuurent medication?    Above note to Dr. Bianchi to review and advise. [email or web address]

## 2017-05-25 NOTE — TELEPHONE ENCOUNTER
Message  Received: Yesterday       Federico Bianchi M.D.  BRITTANI Haji.PIdrisN.       Caller: Unspecified (1 week ago)                     That interacts with aspirin (reduced its efficacy) and he should not use it.     Thx       Tried calling patient, no answer. Notified patient of Dr. Bianchi's note via [employer].    KOMAL AYERS

## 2017-07-11 PROBLEM — Z00.6 ENCOUNTER FOR EXAMINATION FOR NORMAL COMPARISON AND CONTROL IN CLINICAL RESEARCH PROGRAM: Status: RESOLVED | Noted: 2017-03-24 | Resolved: 2017-07-11

## 2017-07-12 DIAGNOSIS — I25.10 CORONARY ARTERY DISEASE INVOLVING NATIVE CORONARY ARTERY OF NATIVE HEART WITHOUT ANGINA PECTORIS: ICD-10-CM

## 2017-07-12 RX ORDER — CLOPIDOGREL BISULFATE 75 MG/1
75 TABLET ORAL DAILY
Qty: 90 TAB | Refills: 3 | Status: SHIPPED | OUTPATIENT
Start: 2017-07-12 | End: 2018-04-25

## 2017-08-03 ENCOUNTER — OFFICE VISIT (OUTPATIENT)
Dept: MEDICAL GROUP | Facility: MEDICAL CENTER | Age: 57
End: 2017-08-03
Payer: COMMERCIAL

## 2017-08-03 VITALS
OXYGEN SATURATION: 94 % | WEIGHT: 226.8 LBS | DIASTOLIC BLOOD PRESSURE: 80 MMHG | TEMPERATURE: 97.1 F | HEART RATE: 80 BPM | RESPIRATION RATE: 16 BRPM | HEIGHT: 70 IN | BODY MASS INDEX: 32.47 KG/M2 | SYSTOLIC BLOOD PRESSURE: 110 MMHG

## 2017-08-03 DIAGNOSIS — R19.7 DIARRHEA, UNSPECIFIED TYPE: ICD-10-CM

## 2017-08-03 PROCEDURE — 99214 OFFICE O/P EST MOD 30 MIN: CPT | Performed by: PHYSICIAN ASSISTANT

## 2017-08-03 RX ORDER — LOPERAMIDE HYDROCHLORIDE 2 MG/1
2 CAPSULE ORAL 4 TIMES DAILY PRN
COMMUNITY
End: 2018-04-25

## 2017-08-03 RX ORDER — DIPHENOXYLATE HCL/ATROPINE 2.5-.025/5
5 LIQUID (ML) ORAL 4 TIMES DAILY PRN
Qty: 1 BOTTLE | Refills: 0 | Status: SHIPPED | OUTPATIENT
Start: 2017-08-03 | End: 2018-04-25

## 2017-08-03 NOTE — ASSESSMENT & PLAN NOTE
This is a 57-year-old male who complains of a 6 day history of diarrhea. Diarrhea started after a visit to [facility]. He ate duck. His wife was there as well and she is not sick but they did not have the same foods. He complains of multiple episodes of loose watery stool. Last night he had 3 loose bowel movements after 12 midnight. Denies any rectal bleeding. Thought he was feverish yesterday didn't take his temperature. Denies any significant abdominal pain. Denies any nauseousness or vomiting. Worsening heartburn symptoms. He's been taking Imodium over-the-counter as needed. He doesn't take it quite as directed on the box but will take one daily. He is still working. He has not had a colonoscopy yet. Did follow up with GI and was told to have it performed after Plavix is stopped. That would be acceptable.

## 2017-08-03 NOTE — PROGRESS NOTES
Subjective:   Yves Ivory is a 57 y.o. male here today for acute diarrhea for 6 days.    Diarrhea  This is a 57-year-old male who complains of a 6 day history of diarrhea. Diarrhea started after a visit to [facility]. He ate duck. His wife was there as well and she is not sick but they did not have the same foods. He complains of multiple episodes of loose watery stool. Last night he had 3 loose bowel movements after 12 midnight. Denies any rectal bleeding. Thought he was feverish yesterday didn't take his temperature. Denies any significant abdominal pain. Denies any nauseousness or vomiting. Worsening heartburn symptoms. He's been taking Imodium over-the-counter as needed. He doesn't take it quite as directed on the box but will take one daily. He is still working. He has not had a colonoscopy yet. Did follow up with GI and was told to have it performed after Plavix is stopped. That would be acceptable.       Current medicines (including changes today)  Current Outpatient Prescriptions   Medication Sig Dispense Refill   • loperamide (IMODIUM A-D) 2 MG Cap Take 2 mg by mouth 4 times a day as needed for Diarrhea.     • diphenoxylate-atropine (LOMOTIL) 2.5-0.025 MG/5ML Liquid Take 5 mL by mouth 4 times a day as needed. 1 Bottle 0   • clopidogrel (PLAVIX) 75 MG Tab Take 1 Tab by mouth every day. 90 Tab 3   • calcium carbonate (TUMS) 500 MG Chew Tab Take 2,000 mg by mouth every day.     • ranitidine (ZANTAC) 150 MG Tab Take 1 Tab by mouth as needed for Heartburn. 90 Tab 3   • lisinopril (PRINIVIL) 5 MG Tab Take 1 Tab by mouth every day. 90 Tab 3   • atorvastatin (LIPITOR) 40 MG Tab Take 1 Tab by mouth every evening. 90 Tab 3   • magnesium oxide (MAG-OX) 400 (241.3 MG) MG Tab tablet Take 1 Tab by mouth as needed. 90 Tab 3   • Coenzyme Q10 (CO Q 10 PO) Take  by mouth every day.     • lorazepam (ATIVAN) 1 MG Tab Take 1 Tab by mouth 2 times a day as needed for Anxiety. 60 Tab 2   • acetaminophen  "(TYLENOL) 500 MG Tab Take 500-1,000 mg by mouth every 6 hours as needed.     • metoprolol (LOPRESSOR) 25 MG Tab Take 0.5 Tabs by mouth 2 times a day. 90 Tab 3   • aspirin EC 81 MG EC tablet Take 1 Tab by mouth every day. 30 Tab 0     No current facility-administered medications for this visit.     He  has a past medical history of Hyperlipidemia; Obesity; Hypertension; CAD (coronary artery disease); Depression; Anxiety; and Ventricular tachycardia (CMS-HCC).    ROS   No chest pain, no shortness of breath and all other systems were reviewed and are negative.       Objective:     Blood pressure 110/80, pulse 80, temperature 36.2 °C (97.1 °F), resp. rate 16, height 1.778 m (5' 10\"), weight 102.876 kg (226 lb 12.8 oz), SpO2 94 %. Body mass index is 32.54 kg/(m^2).   Physical Exam:  Constitutional: Alert, no distress.  Skin: Warm, dry, good turgor, no rashes in visible areas.  Eye: Equal, round and reactive, conjunctiva clear, lids normal.  ENMT: Lips without lesions, good dentition, oropharynx clear.  Neck: Trachea midline, no masses.   Lymph: No cervical or supraclavicular lymphadenopathy  Respiratory: Unlabored respiratory effort, lungs clear to auscultation, no wheezes, no ronchi.  Cardiovascular: Normal S1, S2, no murmur, no edema.  Abdomen: Soft, slight generalized abdominal tenderness, no masses.  Psych: Alert and oriented x3, normal affect and mood.        Assessment and Plan:   The following treatment plan was discussed    1. Diarrhea, unspecified type  Acute, new onset condition. Symptoms only have been for 6 days. Advised to drink small quantities of fluids every half hour. Eat bland foods. May continue Imodium as directed per the box instructions. Provided Lomotil if diarrhea not improved with Imodium. After 4 days and of taking Lomotil if symptoms not improving he is to get labs performed. Then after 2 weeks from the onset of his symptoms he is to contact me for referral to see gastroenterology. Advised " follow-up with any worsening symptoms such as hematochezia or fevers. Advised likely an infectious viral cause. Typically symptoms may take 2 weeks to improve.  - CULTURE STOOL  - COMP METABOLIC PANEL; Future  - OCCULT BLOOD FECES IMMUNOASSAY (FIT); Future  - diphenoxylate-atropine (LOMOTIL) 2.5-0.025 MG/5ML Liquid; Take 5 mL by mouth 4 times a day as needed.  Dispense: 1 Bottle; Refill: 0      Followup: Return if symptoms worsen or fail to improve.    Please note that this dictation was created using voice recognition software. I have made every reasonable attempt to correct obvious errors, but I expect that there are errors of grammar and possibly content that I did not discover before finalizing the note.

## 2017-08-03 NOTE — MR AVS SNAPSHOT
"        Yves Ivory   8/3/2017 7:00 AM   Office Visit   MRN: [id number]    Department:  [place] [address]   Dept Phone:  [phone]    Description:  Male : 1960   Provider:  Donell Rivera PA-C           Reason for Visit     Diarrhea x 6 days      Allergies as of 8/3/2017     Allergen Noted Reactions    Brilinta [Ticagrelor] 2016   Shortness of Breath    Med put patient in ER twice      You were diagnosed with     Diarrhea, unspecified type   [[id number]]         Vital Signs     Blood Pressure Pulse Temperature Respirations Height Weight    110/80 mmHg 80 36.2 °C (97.1 °F) 16 1.778 m (5' 10\") 102.876 kg (226 lb 12.8 oz)    Body Mass Index Oxygen Saturation Smoking Status             32.54 kg/m2 94% Former Smoker         Basic Information     Date Of Birth Sex Race Ethnicity Preferred Language    1960 Male White Non- English      Your appointments     Oct 11, 2017  4:00 PM   FOLLOW UP with Federico Bianchi M.D.   [facility] (--)    [address], [address]  [place] [address]   [phone]              Problem List              ICD-10-CM Priority Class Noted - Resolved    STEMI (ST elevation myocardial infarction) (CMS-HCC) I21.3 Medium  2016 - Present    Coronary artery disease involving native coronary artery of native heart without angina pectoris I25.10 Medium  2016 - Present    HLD (hyperlipidemia) E78.5 Medium  2016 - Present    VT (ventricular tachycardia) (CMS-[facility]) I47.2 Medium  2016 - Present    Cigar smoker F17.290 Low  2016 - Present    Anxiety F41.9 Low  2016 - Present    Abdominal gas pain R14.1 Low  2016 - Present    Preventative health care Z00.00 Low  2016 - Present    Obesity (BMI 30-39.9) E66.9 Medium  2016 - Present    Cough R05 Low  2016 - Present    Diarrhea R19.7   8/3/2017 - Present      Health Maintenance        Date Due Completion Dates    IMM DTaP/Tdap/Td " Vaccine (1 - Tdap) 7/28/1981 ---    IMM PNEUMOCOCCAL 19-64 (ADULT) MEDIUM RISK SERIES (1 of 1 - PPSV23) 7/28/1981 ---    COLONOSCOPY 7/28/2012 ---    IMM INFLUENZA (1) 9/1/2017 10/3/2016            Current Immunizations     Influenza Vaccine Quad Inj (Pf) 10/3/2016      Below and/or attached are the medications your provider expects you to take. Review all of your home medications and newly ordered medications with your provider and/or pharmacist. Follow medication instructions as directed by your provider and/or pharmacist. Please keep your medication list with you and share with your provider. Update the information when medications are discontinued, doses are changed, or new medications (including over-the-counter products) are added; and carry medication information at all times in the event of emergency situations     Allergies:  BRILINTA - Shortness of Breath               Medications  Valid as of: August 03, 2017 -  7:22 AM    Generic Name Brand Name Tablet Size Instructions for use    Acetaminophen (Tab) TYLENOL 500 MG Take 500-1,000 mg by mouth every 6 hours as needed.        Aspirin (Tablet Delayed Response) aspirin 81 MG Take 1 Tab by mouth every day.        Atorvastatin Calcium (Tab) LIPITOR 40 MG Take 1 Tab by mouth every evening.        Calcium Carbonate Antacid (Chew Tab) TUMS 500 MG Take 2,000 mg by mouth every day.        Clopidogrel Bisulfate (Tab) PLAVIX 75 MG Take 1 Tab by mouth every day.        Coenzyme Q10   Take  by mouth every day.        Diphenoxylate-Atropine (Liquid) LOMOTIL 2.5-0.025 MG/5ML Take 5 mL by mouth 4 times a day as needed.        Lisinopril (Tab) PRINIVIL 5 MG Take 1 Tab by mouth every day.        Loperamide HCl (Cap) IMODIUM 2 MG Take 2 mg by mouth 4 times a day as needed for Diarrhea.        LORazepam (Tab) ATIVAN 1 MG Take 1 Tab by mouth 2 times a day as needed for Anxiety.        Magnesium Oxide (Tab) MAG- (241.3 MG) MG Take 1 Tab by mouth as needed.         Metoprolol Tartrate (Tab) LOPRESSOR 25 MG Take 0.5 Tabs by mouth 2 times a day.        RaNITidine HCl (Tab) ZANTAC 150 MG Take 1 Tab by mouth as needed for Heartburn.        .                 Medicines prescribed today were sent to:     [id number] - [place], [place] - [address]    [address] [place] [address]    Phone: [phone] Fax: [phone]    Open 24 Hours?: No      Medication refill instructions:       If your prescription bottle indicates you have medication refills left, it is not necessary to call your provider’s office. Please contact your pharmacy and they will refill your medication.    If your prescription bottle indicates you do not have any refills left, you may request refills at any time through one of the following ways: The online path intelligence system (except Urgent Care), by calling your provider’s office, or by asking your pharmacy to contact your provider’s office with a refill request. Medication refills are processed only during regular business hours and may not be available until the next business day. Your provider may request additional information or to have a follow-up visit with you prior to refilling your medication.   *Please Note: Medication refills are assigned a new Rx number when refilled electronically. Your pharmacy may indicate that no refills were authorized even though a new prescription for the same medication is available at the pharmacy. Please request the medicine by name with the pharmacy before contacting your provider for a refill.        Your To Do List     Future Labs/Procedures Complete By Expires    COMP METABOLIC PANEL  As directed 8/3/2018    OCCULT BLOOD FECES IMMUNOASSAY (FIT)  As directed 8/3/2018         path intelligence Access Code: Activation code not generated  Current path intelligence Status: Active

## 2017-08-28 DIAGNOSIS — I25.10 CORONARY ARTERY DISEASE INVOLVING NATIVE CORONARY ARTERY OF NATIVE HEART WITHOUT ANGINA PECTORIS: ICD-10-CM

## 2017-08-28 RX ORDER — LISINOPRIL 5 MG/1
5 TABLET ORAL DAILY
Qty: 90 TAB | Refills: 3 | OUTPATIENT
Start: 2017-08-28 | End: 2017-10-11 | Stop reason: SDUPTHER

## 2017-10-11 ENCOUNTER — OFFICE VISIT (OUTPATIENT)
Dept: CARDIOLOGY | Facility: MEDICAL CENTER | Age: 57
End: 2017-10-11
Payer: COMMERCIAL

## 2017-10-11 VITALS
DIASTOLIC BLOOD PRESSURE: 92 MMHG | SYSTOLIC BLOOD PRESSURE: 162 MMHG | HEART RATE: 96 BPM | WEIGHT: 235 LBS | HEIGHT: 70 IN | BODY MASS INDEX: 33.64 KG/M2 | OXYGEN SATURATION: 92 %

## 2017-10-11 DIAGNOSIS — E66.9 OBESITY (BMI 30-39.9): ICD-10-CM

## 2017-10-11 DIAGNOSIS — I25.10 CORONARY ARTERY DISEASE INVOLVING NATIVE CORONARY ARTERY OF NATIVE HEART WITHOUT ANGINA PECTORIS: ICD-10-CM

## 2017-10-11 DIAGNOSIS — F41.9 ANXIETY: ICD-10-CM

## 2017-10-11 DIAGNOSIS — I47.20 VT (VENTRICULAR TACHYCARDIA) (HCC): ICD-10-CM

## 2017-10-11 PROCEDURE — 99214 OFFICE O/P EST MOD 30 MIN: CPT | Performed by: INTERNAL MEDICINE

## 2017-10-11 RX ORDER — ATORVASTATIN CALCIUM 80 MG/1
80 TABLET, FILM COATED ORAL NIGHTLY
COMMUNITY
End: 2017-11-01 | Stop reason: SDUPTHER

## 2017-10-11 RX ORDER — LISINOPRIL 10 MG/1
10 TABLET ORAL DAILY
Qty: 90 TAB | Refills: 3 | Status: SHIPPED | OUTPATIENT
Start: 2017-10-11 | End: 2018-10-01 | Stop reason: SDUPTHER

## 2017-11-01 DIAGNOSIS — E78.49 OTHER HYPERLIPIDEMIA: ICD-10-CM

## 2017-11-02 RX ORDER — ATORVASTATIN CALCIUM 80 MG/1
40 TABLET, FILM COATED ORAL EVERY EVENING
Qty: 90 TAB | Refills: 1 | OUTPATIENT
Start: 2017-11-02 | End: 2018-04-02 | Stop reason: SDUPTHER

## 2018-02-06 DIAGNOSIS — Z00.00 PREVENTATIVE HEALTH CARE: ICD-10-CM

## 2018-04-02 ENCOUNTER — PATIENT MESSAGE (OUTPATIENT)
Dept: CARDIOLOGY | Facility: MEDICAL CENTER | Age: 58
End: 2018-04-02

## 2018-04-02 DIAGNOSIS — E78.49 OTHER HYPERLIPIDEMIA: ICD-10-CM

## 2018-04-02 RX ORDER — ATORVASTATIN CALCIUM 80 MG/1
40 TABLET, FILM COATED ORAL EVERY EVENING
Qty: 60 TAB | Refills: 1 | OUTPATIENT
Start: 2018-04-02 | End: 2018-10-01 | Stop reason: SDUPTHER

## 2018-04-02 NOTE — TELEPHONE ENCOUNTER
Called and s/w pt. He reports that he feels confident now to stop his plavix per Dr. Bianchi's OK at the last OV on 10/11. Educated him to make sure to continue to take his ASA 81 mg once he stops his plavix. Pt scheduled for 6 month FU with TW on 4/25. He is requesting a refill for his atorvastatin to be sent to his pharmacy. He notes that PCP ordered labs and he will make sure to get labs done prior to FU with TW. Pt appreciative of call and denies further questions at this time.     Refill for atorvastatin 40mg qNightly #60 with 1 refill called to [facility] per pt request.

## 2018-04-05 ENCOUNTER — HOSPITAL ENCOUNTER (OUTPATIENT)
Dept: LAB | Facility: MEDICAL CENTER | Age: 58
End: 2018-04-05
Attending: PHYSICIAN ASSISTANT
Payer: COMMERCIAL

## 2018-04-05 DIAGNOSIS — R73.03 PREDIABETES: ICD-10-CM

## 2018-04-05 DIAGNOSIS — R19.7 DIARRHEA, UNSPECIFIED TYPE: ICD-10-CM

## 2018-04-05 DIAGNOSIS — Z00.00 PREVENTATIVE HEALTH CARE: ICD-10-CM

## 2018-04-05 LAB
ALBUMIN SERPL BCP-MCNC: 4.4 G/DL (ref 3.2–4.9)
ALBUMIN/GLOB SERPL: 1.8 G/DL
ALP SERPL-CCNC: 52 U/L (ref 30–99)
ALT SERPL-CCNC: 47 U/L (ref 2–50)
ANION GAP SERPL CALC-SCNC: 8 MMOL/L (ref 0–11.9)
AST SERPL-CCNC: 22 U/L (ref 12–45)
BILIRUB SERPL-MCNC: 0.6 MG/DL (ref 0.1–1.5)
BUN SERPL-MCNC: 19 MG/DL (ref 8–22)
CALCIUM SERPL-MCNC: 9.3 MG/DL (ref 8.5–10.5)
CHLORIDE SERPL-SCNC: 103 MMOL/L (ref 96–112)
CHOLEST SERPL-MCNC: 136 MG/DL (ref 100–199)
CO2 SERPL-SCNC: 27 MMOL/L (ref 20–33)
CREAT SERPL-MCNC: 0.79 MG/DL (ref 0.5–1.4)
GLOBULIN SER CALC-MCNC: 2.4 G/DL (ref 1.9–3.5)
GLUCOSE SERPL-MCNC: 117 MG/DL (ref 65–99)
HDLC SERPL-MCNC: 33 MG/DL
LDLC SERPL CALC-MCNC: 67 MG/DL
POTASSIUM SERPL-SCNC: 4 MMOL/L (ref 3.6–5.5)
PROT SERPL-MCNC: 6.8 G/DL (ref 6–8.2)
PSA SERPL-MCNC: 0.56 NG/ML (ref 0–4)
SODIUM SERPL-SCNC: 138 MMOL/L (ref 135–145)
TRIGL SERPL-MCNC: 179 MG/DL (ref 0–149)

## 2018-04-05 PROCEDURE — 80061 LIPID PANEL: CPT

## 2018-04-05 PROCEDURE — 84153 ASSAY OF PSA TOTAL: CPT

## 2018-04-05 PROCEDURE — 80053 COMPREHEN METABOLIC PANEL: CPT

## 2018-04-05 PROCEDURE — 36415 COLL VENOUS BLD VENIPUNCTURE: CPT

## 2018-04-20 ENCOUNTER — HOSPITAL ENCOUNTER (OUTPATIENT)
Dept: LAB | Facility: MEDICAL CENTER | Age: 58
End: 2018-04-20
Attending: PHYSICIAN ASSISTANT
Payer: COMMERCIAL

## 2018-04-20 DIAGNOSIS — R73.03 PREDIABETES: ICD-10-CM

## 2018-04-20 LAB
EST. AVERAGE GLUCOSE BLD GHB EST-MCNC: 126 MG/DL
HBA1C MFR BLD: 6 % (ref 0–5.6)

## 2018-04-20 PROCEDURE — 83036 HEMOGLOBIN GLYCOSYLATED A1C: CPT

## 2018-04-20 PROCEDURE — 36415 COLL VENOUS BLD VENIPUNCTURE: CPT

## 2018-04-22 PROBLEM — R73.03 PREDIABETES: Status: ACTIVE | Noted: 2018-04-22

## 2018-04-25 ENCOUNTER — OFFICE VISIT (OUTPATIENT)
Dept: CARDIOLOGY | Facility: MEDICAL CENTER | Age: 58
End: 2018-04-25
Payer: COMMERCIAL

## 2018-04-25 VITALS
HEART RATE: 84 BPM | SYSTOLIC BLOOD PRESSURE: 130 MMHG | HEIGHT: 71 IN | WEIGHT: 240 LBS | BODY MASS INDEX: 33.6 KG/M2 | DIASTOLIC BLOOD PRESSURE: 70 MMHG

## 2018-04-25 DIAGNOSIS — R14.1 ABDOMINAL GAS PAIN: ICD-10-CM

## 2018-04-25 DIAGNOSIS — R53.83 FATIGUE, UNSPECIFIED TYPE: ICD-10-CM

## 2018-04-25 DIAGNOSIS — E66.9 CLASS 1 OBESITY WITHOUT SERIOUS COMORBIDITY IN ADULT, UNSPECIFIED BMI, UNSPECIFIED OBESITY TYPE: ICD-10-CM

## 2018-04-25 DIAGNOSIS — I47.20 VT (VENTRICULAR TACHYCARDIA) (HCC): ICD-10-CM

## 2018-04-25 DIAGNOSIS — R73.03 PREDIABETES: ICD-10-CM

## 2018-04-25 DIAGNOSIS — I25.10 CORONARY ARTERY DISEASE INVOLVING NATIVE CORONARY ARTERY OF NATIVE HEART WITHOUT ANGINA PECTORIS: ICD-10-CM

## 2018-04-25 DIAGNOSIS — N52.9 ERECTILE DYSFUNCTION, UNSPECIFIED ERECTILE DYSFUNCTION TYPE: ICD-10-CM

## 2018-04-25 PROCEDURE — 99214 OFFICE O/P EST MOD 30 MIN: CPT | Performed by: INTERNAL MEDICINE

## 2018-04-25 RX ORDER — DILTIAZEM HYDROCHLORIDE 120 MG/1
120 CAPSULE, COATED, EXTENDED RELEASE ORAL DAILY
Qty: 30 CAP | Refills: 11 | Status: SHIPPED | OUTPATIENT
Start: 2018-04-25 | End: 2019-03-13 | Stop reason: SDUPTHER

## 2018-04-25 RX ORDER — SILDENAFIL 100 MG/1
100 TABLET, FILM COATED ORAL PRN
Qty: 5 TAB | Refills: 3 | Status: SHIPPED | OUTPATIENT
Start: 2018-04-25 | End: 2019-10-08 | Stop reason: SDUPTHER

## 2018-04-25 NOTE — PROGRESS NOTES
Subjective:   Yves Ivory is a 57 y.o. male who presents today for all up after STEMI complicated by Arrest.    Hx of anxiety, HLD, obesity, HTN, anxiety, depression, smoking hx, and now CAD s/p STEMI with DENISSE to OM, VF arrest.    Feeling very well since last visit. Continues to gain weight on abated however and this has resulted in fatigue. He does not exercise routinely. Cholesterol profile and blood pressure are much better now that medical therapy has been adjusted. He also complains of some erectile dysfunction and wonders if it is his beta blocker.    He has had no episodes of chest pain, shortness of breath, orthopnea, or peripheral edema.    Past Medical History:   Diagnosis Date   • Anxiety    • CAD (coronary artery disease)     STEMI-S/P DENISSE to OM   • Depression    • Hyperlipidemia    • Hypertension    • Obesity    • Ventricular tachycardia (CMS-HCC)     post STEMI      Past Surgical History:   Procedure Laterality Date   • ANGIOPLASTY     • APPENDECTOMY CHILD     • CARDIAC CATH       History reviewed. No pertinent family history.  History   Smoking Status   • Former Smoker   • Years: 40.00   • Types: Cigars   Smokeless Tobacco   • Never Used     Comment: Occasional cigar     Allergies   Allergen Reactions   • Brilinta [Ticagrelor] Shortness of Breath     Med put patient in ER twice     Outpatient Encounter Prescriptions as of 4/25/2018   Medication Sig Dispense Refill   • sildenafil citrate (VIAGRA) 100 MG tablet Take 1 Tab by mouth as needed for Erectile Dysfunction. 5 Tab 3   • DILTIAZem CD (CARDIZEM CD) 120 MG CAPSULE SR 24 HR Take 1 Cap by mouth every day. 30 Cap 11   • atorvastatin (LIPITOR) 80 MG tablet Take 0.5 Tabs by mouth every evening. 60 Tab 1   • B Complex-Biotin-FA (SUPER B-COMPLEX PO) Take  by mouth every day.     • lisinopril (PRINIVIL) 10 MG Tab Take 1 Tab by mouth every day. 90 Tab 3   • calcium carbonate (TUMS) 500 MG Chew Tab Take 2,000 mg by mouth every day.     • acetaminophen  "(TYLENOL) 500 MG Tab Take 500-1,000 mg by mouth every 6 hours as needed.     • aspirin EC 81 MG EC tablet Take 1 Tab by mouth every day. 30 Tab 0   • [DISCONTINUED] metoprolol (LOPRESSOR) 25 MG Tab Take 0.5 Tabs by mouth 2 times a day. 90 Tab 3   • [DISCONTINUED] loperamide (IMODIUM A-D) 2 MG Cap Take 2 mg by mouth 4 times a day as needed for Diarrhea.     • [DISCONTINUED] diphenoxylate-atropine (LOMOTIL) 2.5-0.025 MG/5ML Liquid Take 5 mL by mouth 4 times a day as needed. 1 Bottle 0   • [DISCONTINUED] clopidogrel (PLAVIX) 75 MG Tab Take 1 Tab by mouth every day. 90 Tab 3   • [DISCONTINUED] ranitidine (ZANTAC) 150 MG Tab Take 1 Tab by mouth as needed for Heartburn. 90 Tab 3   • [DISCONTINUED] magnesium oxide (MAG-OX) 400 (241.3 MG) MG Tab tablet Take 1 Tab by mouth as needed. 90 Tab 3   • [DISCONTINUED] Coenzyme Q10 (CO Q 10 PO) Take  by mouth every day.     • [DISCONTINUED] lorazepam (ATIVAN) 1 MG Tab Take 1 Tab by mouth 2 times a day as needed for Anxiety. 60 Tab 2     No facility-administered encounter medications on file as of 4/25/2018.      Review of Systems   All other systems reviewed and are negative.       Objective:   /70   Pulse 84   Ht 1.803 m (5' 11\")   Wt 108.9 kg (240 lb)   BMI 33.47 kg/m²     Physical Exam   Constitutional: He is oriented to person, place, and time. He appears well-developed and well-nourished. No distress.   HENT:   Head: Normocephalic and atraumatic.   Mouth/Throat: Oropharynx is clear and moist.   Eyes: Conjunctivae and EOM are normal. Pupils are equal, round, and reactive to light. No scleral icterus.   Neck: Normal range of motion. No JVD present. No tracheal deviation present. No thyromegaly present.   Cardiovascular: Normal rate, regular rhythm, S1 normal, normal heart sounds and intact distal pulses.  PMI is not displaced.  Exam reveals no gallop and no friction rub.    No murmur heard.  Pulses:       Carotid pulses are 2+ on the right side, and 2+ on the left " side.       Radial pulses are 2+ on the right side, and 2+ on the left side.        Dorsalis pedis pulses are 2+ on the right side, and 2+ on the left side.        Posterior tibial pulses are 2+ on the right side, and 2+ on the left side.   Pulmonary/Chest: Effort normal and breath sounds normal. No respiratory distress. He has no wheezes. He has no rales.   Abdominal: Soft. Bowel sounds are normal. He exhibits no distension and no pulsatile midline mass. There is no tenderness. There is no guarding.   Musculoskeletal: Normal range of motion. He exhibits no edema.   Neurological: He is alert and oriented to person, place, and time. No cranial nerve deficit.   Skin: Skin is warm and dry. No rash noted. He is not diaphoretic. No erythema.   Psychiatric: His behavior is normal. Thought content normal. His mood appears anxious.   Jittery and restless in exam   Nursing note and vitals reviewed.    7/7/16 ANGIOGRAM POSTPROCEDURE DIAGNOSES:  1.  Coronary artery disease including occluded proximal obtuse marginal    branch, additional nonobstructive mid left anterior descending artery    stenosis.  2.  Successful thrombectomy/PTCA/stent placement of the proximal to mid obtuse   marginal branch with 3.0x32-mm Promus PREMIER drug-eluting stent.  3.  Angio-Seal closure.    7/8/16 ECHO CONCLUSIONS  Left ventricular ejection fraction is visually estimated to be 55%,   inferior hypokinesis.  Grade II-III diastolic dysfunction.  Mild concentric left ventricular   hypertrophy.  Dilated inferior vena cava with inspiratory collapse.  No significant valve disease or flow abnormalities.   No prior study is available for comparison.     Lab Results   Component Value Date/Time    WBC 8.6 10/06/2016 02:20 PM    RBC 5.26 10/06/2016 02:20 PM    HEMOGLOBIN 16.0 10/06/2016 02:20 PM    HEMATOCRIT 45.9 10/06/2016 02:20 PM    MCV 87.3 10/06/2016 02:20 PM    MCH 30.4 10/06/2016 02:20 PM    MCHC 34.9 10/06/2016 02:20 PM    MPV 9.6 10/06/2016 02:20  PM      Lab Results   Component Value Date/Time    CHOLSTRLTOT 136 04/05/2018 06:40 AM    LDL 67 04/05/2018 06:40 AM    HDL 33 (A) 04/05/2018 06:40 AM    TRIGLYCERIDE 179 (H) 04/05/2018 06:40 AM       Lab Results   Component Value Date/Time    SODIUM 138 04/05/2018 06:40 AM    POTASSIUM 4.0 04/05/2018 06:40 AM    CHLORIDE 103 04/05/2018 06:40 AM    CO2 27 04/05/2018 06:40 AM    GLUCOSE 117 (H) 04/05/2018 06:40 AM    BUN 19 04/05/2018 06:40 AM    CREATININE 0.79 04/05/2018 06:40 AM     Lab Results   Component Value Date/Time    ALKPHOSPHAT 52 04/05/2018 06:40 AM    ASTSGOT 22 04/05/2018 06:40 AM    ALTSGPT 47 04/05/2018 06:40 AM    TBILIRUBIN 0.6 04/05/2018 06:40 AM      Lab Results   Component Value Date/Time    BNPBTYPENAT 6 10/06/2016 02:20 PM      Assessment:     1. Coronary artery disease involving native coronary artery of native heart without angina pectoris  DILTIAZem CD (CARDIZEM CD) 120 MG CAPSULE SR 24 HR   2. Fatigue, unspecified type  TESTOSTERONE, FREE AND TOTAL   3. VT (ventricular tachycardia) (CMS-HCC)     4. Abdominal gas pain     5. Prediabetes     6. Class 1 obesity without serious comorbidity in adult, unspecified BMI, unspecified obesity type     7. Erectile dysfunction, unspecified erectile dysfunction type  TESTOSTERONE, FREE AND TOTAL    sildenafil citrate (VIAGRA) 100 MG tablet       Medical Decision Making:  Today's Assessment / Status / Plan:     Complains of fatigue and not sleeping well associated with lack of energy. This is most likely related to his rapid week recent weight gain. Blood pressure and cholesterol are good. He is on aspirin monotherapy. We discussed many insightful questions today. Suggested that if he were able to lose weight and exercise more he would feel significant improvement. Also will try him off of his beta blocker.With regard to his erectile dysfunction we discussed Viagra appropriate use and side effects to be concerned about and EMS use.      1. Increase  physical activity in a graduated manner with weight loss  3. Testosterone  Evaluation  3.Viagra. Dosing and administration as well as side effects were discussed.  4. Discontinue Lopressor and initiate diltiazem 120 mg CD.    FU6M          Physical Exam   Constitutional: He is oriented to person, place, and time. He appears well-developed and well-nourished. No distress.   HENT:   Head: Normocephalic and atraumatic.   Mouth/Throat: Oropharynx is clear and moist.   Eyes: Conjunctivae and EOM are normal. Pupils are equal, round, and reactive to light. No scleral icterus.   Neck: Normal range of motion. No JVD present. No tracheal deviation present. No thyromegaly present.   Cardiovascular: Normal rate, regular rhythm, S1 normal, normal heart sounds and intact distal pulses.  PMI is not displaced.  Exam reveals no gallop and no friction rub.    No murmur heard.  Pulses:       Carotid pulses are 2+ on the right side, and 2+ on the left side.       Radial pulses are 2+ on the right side, and 2+ on the left side.        Dorsalis pedis pulses are 2+ on the right side, and 2+ on the left side.        Posterior tibial pulses are 2+ on the right side, and 2+ on the left side.   Pulmonary/Chest: Effort normal and breath sounds normal. No respiratory distress. He has no wheezes. He has no rales.   Abdominal: Soft. Bowel sounds are normal. He exhibits no distension and no pulsatile midline mass. There is no tenderness. There is no guarding.   Musculoskeletal: Normal range of motion. He exhibits no edema.   Neurological: He is alert and oriented to person, place, and time. No cranial nerve deficit.   Skin: Skin is warm and dry. No rash noted. He is not diaphoretic. No erythema.   Psychiatric: His behavior is normal. Thought content normal. His mood appears anxious.   Jittery and restless in exam   Nursing note and vitals reviewed.

## 2018-10-01 DIAGNOSIS — I25.10 CORONARY ARTERY DISEASE INVOLVING NATIVE CORONARY ARTERY OF NATIVE HEART WITHOUT ANGINA PECTORIS: ICD-10-CM

## 2018-10-01 DIAGNOSIS — E78.49 OTHER HYPERLIPIDEMIA: ICD-10-CM

## 2018-10-01 RX ORDER — LISINOPRIL 10 MG/1
10 TABLET ORAL DAILY
Qty: 90 TAB | Refills: 3 | Status: SHIPPED | OUTPATIENT
Start: 2018-10-01 | End: 2019-06-13

## 2018-10-01 RX ORDER — ATORVASTATIN CALCIUM 80 MG/1
TABLET, FILM COATED ORAL
Qty: 90 TAB | Refills: 2 | Status: SHIPPED | OUTPATIENT
Start: 2018-10-01 | End: 2019-12-16

## 2018-10-05 DIAGNOSIS — I25.10 CORONARY ARTERY DISEASE INVOLVING NATIVE CORONARY ARTERY OF NATIVE HEART WITHOUT ANGINA PECTORIS: ICD-10-CM

## 2018-10-09 RX ORDER — LISINOPRIL 10 MG/1
10 TABLET ORAL DAILY
Qty: 90 TAB | Refills: 3 | Status: SHIPPED | OUTPATIENT
Start: 2018-10-09 | End: 2019-12-18

## 2019-01-04 ENCOUNTER — HOSPITAL ENCOUNTER (OUTPATIENT)
Dept: LAB | Facility: MEDICAL CENTER | Age: 59
End: 2019-01-04
Attending: INTERNAL MEDICINE
Payer: COMMERCIAL

## 2019-01-04 PROCEDURE — 84403 ASSAY OF TOTAL TESTOSTERONE: CPT

## 2019-01-04 PROCEDURE — 36415 COLL VENOUS BLD VENIPUNCTURE: CPT

## 2019-01-04 PROCEDURE — 84270 ASSAY OF SEX HORMONE GLOBUL: CPT

## 2019-01-05 LAB
SHBG SERPL-SCNC: 25 NMOL/L (ref 11–80)
TESTOST FREE MFR SERPL: 2 % (ref 1.6–2.9)
TESTOST FREE SERPL-MCNC: 51 PG/ML (ref 47–244)
TESTOST SERPL-MCNC: 252 NG/DL (ref 300–890)

## 2019-01-18 ENCOUNTER — OFFICE VISIT (OUTPATIENT)
Dept: CARDIOLOGY | Facility: MEDICAL CENTER | Age: 59
End: 2019-01-18
Payer: COMMERCIAL

## 2019-01-18 ENCOUNTER — TELEPHONE (OUTPATIENT)
Dept: CARDIOLOGY | Facility: MEDICAL CENTER | Age: 59
End: 2019-01-18

## 2019-01-18 VITALS
DIASTOLIC BLOOD PRESSURE: 88 MMHG | SYSTOLIC BLOOD PRESSURE: 118 MMHG | HEART RATE: 90 BPM | HEIGHT: 71 IN | OXYGEN SATURATION: 94 % | BODY MASS INDEX: 33.6 KG/M2 | WEIGHT: 240 LBS

## 2019-01-18 DIAGNOSIS — I47.20 VT (VENTRICULAR TACHYCARDIA) (HCC): ICD-10-CM

## 2019-01-18 DIAGNOSIS — I25.10 CORONARY ARTERY DISEASE INVOLVING NATIVE CORONARY ARTERY OF NATIVE HEART WITHOUT ANGINA PECTORIS: ICD-10-CM

## 2019-01-18 DIAGNOSIS — R53.83 FATIGUE, UNSPECIFIED TYPE: ICD-10-CM

## 2019-01-18 DIAGNOSIS — E78.2 MIXED HYPERLIPIDEMIA: ICD-10-CM

## 2019-01-18 PROCEDURE — 99214 OFFICE O/P EST MOD 30 MIN: CPT | Performed by: INTERNAL MEDICINE

## 2019-01-18 NOTE — PROGRESS NOTES
Subjective:   Yves Ivory is a 58 y.o. male who presents today for all up after STEMI complicated by Arrest.    Hx of anxiety, HLD, obesity, HTN, anxiety, depression, smoking hx, and now CAD s/p STEMI with DENSISE to OM, VF arrest.    Since last visit he has not gained any more weight.  His fatigue has lessened somewhat.  He did not try the Viagra due to cost.  Testosterone levels have returned on the lower side.  He is plan to follow-up with PCP about this.  He has been taking his medications as directed and exercising regularly without symptoms.    He has had no episodes of chest pain, shortness of breath, orthopnea, or peripheral edema.    Past Medical History:   Diagnosis Date   • Anxiety    • CAD (coronary artery disease)     STEMI-S/P DENISSE to OM   • Depression    • Hyperlipidemia    • Hypertension    • Obesity    • Ventricular tachycardia (HCC)     post STEMI      Past Surgical History:   Procedure Laterality Date   • ANGIOPLASTY     • APPENDECTOMY CHILD     • CARDIAC CATH       No family history on file.  History   Smoking Status   • Former Smoker   • Years: 40.00   • Types: Cigars   Smokeless Tobacco   • Never Used     Comment: Occasional cigar     Allergies   Allergen Reactions   • Brilinta [Ticagrelor] Shortness of Breath     Med put patient in ER twice     Outpatient Encounter Prescriptions as of 1/18/2019   Medication Sig Dispense Refill   • Phenylephrine HCl (AFRIN ALLERGY NA) Spray  in nose.     • Diphenhydramine-APAP, sleep, (TYLENOL PM EXTRA STRENGTH PO) Take  by mouth.     • atorvastatin (LIPITOR) 80 MG tablet TAKE 1/2 A TABLET BY MOUTH AT BEDTIME 90 Tab 2   • lisinopril (PRINIVIL) 10 MG Tab TAKE 1 TAB BY MOUTH EVERY DAY. 90 Tab 3   • DILTIAZem CD (CARDIZEM CD) 120 MG CAPSULE SR 24 HR Take 1 Cap by mouth every day. 30 Cap 11   • aspirin EC 81 MG EC tablet Take 1 Tab by mouth every day. 30 Tab 0   • lisinopril (PRINIVIL) 10 MG Tab TAKE 1 TAB BY MOUTH EVERY DAY. 90 Tab 3   • sildenafil citrate  "(VIAGRA) 100 MG tablet Take 1 Tab by mouth as needed for Erectile Dysfunction. (Patient not taking: Reported on 1/18/2019) 5 Tab 3   • B Complex-Biotin-FA (SUPER B-COMPLEX PO) Take  by mouth every day.     • calcium carbonate (TUMS) 500 MG Chew Tab Take 2,000 mg by mouth every day.     • acetaminophen (TYLENOL) 500 MG Tab Take 500-1,000 mg by mouth every 6 hours as needed.       No facility-administered encounter medications on file as of 1/18/2019.      Review of Systems   All other systems reviewed and are negative.       Objective:   /88 (BP Location: Left arm, Patient Position: Sitting, BP Cuff Size: Large adult)   Pulse 90   Ht 1.803 m (5' 11\")   Wt 108.9 kg (240 lb)   SpO2 94%   BMI 33.47 kg/m²     Physical Exam   Constitutional: He is oriented to person, place, and time. He appears well-developed and well-nourished. No distress.   HENT:   Head: Normocephalic and atraumatic.   Mouth/Throat: Oropharynx is clear and moist.   Eyes: Pupils are equal, round, and reactive to light. Conjunctivae and EOM are normal. No scleral icterus.   Neck: Normal range of motion. No JVD present. No tracheal deviation present. No thyromegaly present.   Cardiovascular: Normal rate, regular rhythm, S1 normal, normal heart sounds and intact distal pulses.  PMI is not displaced.  Exam reveals no gallop and no friction rub.    No murmur heard.  Pulses:       Carotid pulses are 2+ on the right side, and 2+ on the left side.       Radial pulses are 2+ on the right side, and 2+ on the left side.        Dorsalis pedis pulses are 2+ on the right side, and 2+ on the left side.        Posterior tibial pulses are 2+ on the right side, and 2+ on the left side.   Pulmonary/Chest: Effort normal and breath sounds normal. No respiratory distress. He has no wheezes. He has no rales.   Abdominal: Soft. Bowel sounds are normal. He exhibits no distension and no pulsatile midline mass. There is no tenderness. There is no guarding. "   Musculoskeletal: Normal range of motion. He exhibits no edema.   Neurological: He is alert and oriented to person, place, and time. No cranial nerve deficit.   Skin: Skin is warm and dry. No rash noted. He is not diaphoretic. No erythema.   Psychiatric: He has a normal mood and affect. His behavior is normal. Thought content normal. His mood appears not anxious.   Nursing note and vitals reviewed.    7/7/16 ANGIOGRAM POSTPROCEDURE DIAGNOSES:  1.  Coronary artery disease including occluded proximal obtuse marginal    branch, additional nonobstructive mid left anterior descending artery    stenosis.  2.  Successful thrombectomy/PTCA/stent placement of the proximal to mid obtuse   marginal branch with 3.0x32-mm Promus PREMIER drug-eluting stent.  3.  Angio-Seal closure.    7/8/16 ECHO CONCLUSIONS  Left ventricular ejection fraction is visually estimated to be 55%,   inferior hypokinesis.  Grade II-III diastolic dysfunction.  Mild concentric left ventricular   hypertrophy.  Dilated inferior vena cava with inspiratory collapse.  No significant valve disease or flow abnormalities.   No prior study is available for comparison.     Lab Results   Component Value Date/Time    WBC 8.6 10/06/2016 02:20 PM    RBC 5.26 10/06/2016 02:20 PM    HEMOGLOBIN 16.0 10/06/2016 02:20 PM    HEMATOCRIT 45.9 10/06/2016 02:20 PM    MCV 87.3 10/06/2016 02:20 PM    MCH 30.4 10/06/2016 02:20 PM    MCHC 34.9 10/06/2016 02:20 PM    MPV 9.6 10/06/2016 02:20 PM      Lab Results   Component Value Date/Time    CHOLSTRLTOT 136 04/05/2018 06:40 AM    LDL 67 04/05/2018 06:40 AM    HDL 33 (A) 04/05/2018 06:40 AM    TRIGLYCERIDE 179 (H) 04/05/2018 06:40 AM       Lab Results   Component Value Date/Time    SODIUM 138 04/05/2018 06:40 AM    POTASSIUM 4.0 04/05/2018 06:40 AM    CHLORIDE 103 04/05/2018 06:40 AM    CO2 27 04/05/2018 06:40 AM    GLUCOSE 117 (H) 04/05/2018 06:40 AM    BUN 19 04/05/2018 06:40 AM    CREATININE 0.79 04/05/2018 06:40 AM     Lab  Results   Component Value Date/Time    ALKPHOSPHAT 52 04/05/2018 06:40 AM    ASTSGOT 22 04/05/2018 06:40 AM    ALTSGPT 47 04/05/2018 06:40 AM    TBILIRUBIN 0.6 04/05/2018 06:40 AM      Lab Results   Component Value Date/Time    BNPBTYPENAT 6 10/06/2016 02:20 PM      Assessment:     1. Fatigue, unspecified type  OVERNIGHT PULSE OXIMETRY   2. Coronary artery disease involving native coronary artery of native heart without angina pectoris  Lipid Profile    HEPATIC FUNCTION PANEL   3. Mixed hyperlipidemia  Lipid Profile    HEPATIC FUNCTION PANEL   4. VT (ventricular tachycardia) (HCC)         Medical Decision Making:  Today's Assessment / Status / Plan:     Continues to not sleep very well but does take Benadryl which has a hangover effect and we discussed this.  Recommended further increase in his physical activity with graded weight loss.  Recommend he discuss the testosterone levels with his PCP.  Should he be truly low and benefit from replacement from a cardiac perspective we discussed the risks and benefits of this.  Provided with a Viagra sample prescription to see if it works.  Tolerating the diltiazem well blood pressure caused fatigue.  Recommend overnight oximetry.    Up with cardiology yearly and with PCP more routinely.          Physical Exam   Constitutional: He is oriented to person, place, and time. He appears well-developed and well-nourished. No distress.   HENT:   Head: Normocephalic and atraumatic.   Mouth/Throat: Oropharynx is clear and moist.   Eyes: Pupils are equal, round, and reactive to light. Conjunctivae and EOM are normal. No scleral icterus.   Neck: Normal range of motion. No JVD present. No tracheal deviation present. No thyromegaly present.   Cardiovascular: Normal rate, regular rhythm, S1 normal, normal heart sounds and intact distal pulses.  PMI is not displaced.  Exam reveals no gallop and no friction rub.    No murmur heard.  Pulses:       Carotid pulses are 2+ on the right side, and  2+ on the left side.       Radial pulses are 2+ on the right side, and 2+ on the left side.        Dorsalis pedis pulses are 2+ on the right side, and 2+ on the left side.        Posterior tibial pulses are 2+ on the right side, and 2+ on the left side.   Pulmonary/Chest: Effort normal and breath sounds normal. No respiratory distress. He has no wheezes. He has no rales.   Abdominal: Soft. Bowel sounds are normal. He exhibits no distension and no pulsatile midline mass. There is no tenderness. There is no guarding.   Musculoskeletal: Normal range of motion. He exhibits no edema.   Neurological: He is alert and oriented to person, place, and time. No cranial nerve deficit.   Skin: Skin is warm and dry. No rash noted. He is not diaphoretic. No erythema.   Psychiatric: He has a normal mood and affect. His behavior is normal. Thought content normal. His mood appears not anxious.   Nursing note and vitals reviewed.

## 2019-01-18 NOTE — TELEPHONE ENCOUNTER
OPO faxed to [facility]-  Phone: [phone]  Fax: [phone]

## 2019-02-01 ENCOUNTER — TELEPHONE (OUTPATIENT)
Dept: CARDIOLOGY | Facility: MEDICAL CENTER | Age: 59
End: 2019-02-01

## 2019-02-01 DIAGNOSIS — G47.34 NOCTURNAL HYPOXIA: ICD-10-CM

## 2019-02-01 NOTE — TELEPHONE ENCOUNTER
----- Message from Federico Bianchi M.D. sent at 1/31/2019 11:10 AM PST -----   Abnormal OPO. Please refer to PMA.    Thanks    ----- Message -----  From: Michelle Pack R.N.  Sent: 1/30/2019  11:11 AM  To: Federico Bianchi M.D.    No fv

## 2019-03-13 DIAGNOSIS — I25.10 CORONARY ARTERY DISEASE INVOLVING NATIVE CORONARY ARTERY OF NATIVE HEART WITHOUT ANGINA PECTORIS: ICD-10-CM

## 2019-03-19 RX ORDER — DILTIAZEM HYDROCHLORIDE 120 MG/1
120 CAPSULE, COATED, EXTENDED RELEASE ORAL DAILY
Qty: 90 CAP | Refills: 3 | Status: SHIPPED | OUTPATIENT
Start: 2019-03-19 | End: 2020-03-10

## 2019-04-25 PROBLEM — G47.33 OSA (OBSTRUCTIVE SLEEP APNEA): Status: ACTIVE | Noted: 2019-04-25

## 2019-04-25 PROBLEM — Z87.891 FORMER SMOKER: Status: ACTIVE | Noted: 2019-04-25

## 2019-04-25 NOTE — PROGRESS NOTES
"CC: Possible sleep apnea    HPI:    Mr. Yves Ivory is a 58-year-old male kindly referred by Dr. Federico Bianchi MD for evaluation of possible obstructive sleep apnea syndrome.    Patient briefly describes his sleep problem as \"in my 30s I noticed I woke up short of breath\".  He considers this a 3 or 4 out of a 1-10 severity scale.    The patient generally goes to bed between 9-10 PM and gets up between 530-6 AM.  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  He has a regular bed partner but generally sleeps on the couch.  He reports 3-4 nocturnal awakenings and experiences 3-4 episodes of nocturia.    Symptoms include waking up too early in the morning and being unable to return to sleep, napping or returning to bed after arising, too little sleep at night, tiredness during the day, difficulty breathing through his nose at night for which she uses Afrin, and loud and disturbing snoring.    He may occasionally have leg twitching once a month he denies arm or leg jerking or twitching during sleep.  He reports sleep talking and teeth grinding..    Review of his sleep duration would nap in 2 of 7 days.  He awakened feeling good or okay patient every morning.    His wife has noted mild snoring, light snoring, twitching of legs or feet during sleep, pauses in breathing, sleep talking, and resuscitative snorts.  The symptoms have been present for 18 years and are aggravated by alcohol consumption.  His total Eastman score is 9 out of 24.    Significant comorbidities and modifying factors include STEMI complicated by VF arrest, status post DENISSE to OM, HLD, HTN, obesity, anxiety and depression, former smoker, and prediabetes.      Patient Active Problem List    Diagnosis Date Noted   • Obesity (BMI 30-39.9) 12/12/2016     Priority: Medium   • Coronary artery disease involving native coronary artery of native heart without angina pectoris 07/26/2016     Priority: Medium   • HLD (hyperlipidemia) " 07/26/2016     Priority: Medium   • VT (ventricular tachycardia) ([facility]) 07/26/2016     Priority: Medium   • STEMI (ST elevation myocardial infarction) ([facility]) 07/07/2016     Priority: Medium   • Abdominal gas pain 12/12/2016     Priority: Low   • Preventative health care 12/12/2016     Priority: Low   • Cough 12/12/2016     Priority: Low   • Cigar smoker 08/22/2016     Priority: Low   • Anxiety 08/22/2016     Priority: Low   • ROSALINO (obstructive sleep apnea) 04/25/2019   • Former smoker 04/25/2019   • Prediabetes 04/22/2018   • Diarrhea 08/03/2017       Past Medical History:   Diagnosis Date   • Anxiety    • Bronchitis    • CAD (coronary artery disease)     STEMI-S/P DENISSE to OM   • Chickenpox    • Coronary heart disease    • Depression    • Armenian measles    • Hyperlipidemia    • Hypertension    • Influenza    • Obesity    • Sleep apnea    • Ventricular tachycardia (HCC)     post STEMI    • Whooping cough         Past Surgical History:   Procedure Laterality Date   • ANGIOPLASTY     • APPENDECTOMY CHILD     • TONSILLECTOMY     • ZZZ CARDIAC CATH         History reviewed. No pertinent family history.    Social History     Social History   • Marital status:      Spouse name: N/A   • Number of children: N/A   • Years of education: N/A     Occupational History   • Not on file.     Social History Main Topics   • Smoking status: Former Smoker     Packs/day: 1.00     Years: 40.00     Types: Cigars     Quit date: 2016   • Smokeless tobacco: Former User     Types: Chew     Quit date: 2016      Comment: Occasional cigar   • Alcohol use Yes      Comment: 6-7/week   • Drug use: No   • Sexual activity: Yes     Partners: Female     Other Topics Concern   • Not on file     Social History Narrative   • No narrative on file       Current Outpatient Prescriptions   Medication Sig Dispense Refill   • zolpidem (AMBIEN) 5 MG Tab Take 1 Tab by mouth at bedtime as needed for up to 3 doses. Take 1-3 tabs prn 3 Tab 0   • DILTIAZem CD  "(CARDIZEM CD) 120 MG CAPSULE SR 24 HR Take 1 Cap by mouth every day. 90 Cap 3   • Phenylephrine HCl (AFRIN ALLERGY NA) Spray  in nose.     • lisinopril (PRINIVIL) 10 MG Tab TAKE 1 TAB BY MOUTH EVERY DAY. 90 Tab 3   • atorvastatin (LIPITOR) 80 MG tablet TAKE 1/2 A TABLET BY MOUTH AT BEDTIME 90 Tab 2   • lisinopril (PRINIVIL) 10 MG Tab TAKE 1 TAB BY MOUTH EVERY DAY. 90 Tab 3   • acetaminophen (TYLENOL) 500 MG Tab Take 500-1,000 mg by mouth every 6 hours as needed.     • aspirin EC 81 MG EC tablet Take 1 Tab by mouth every day. 30 Tab 0   • Diphenhydramine-APAP, sleep, (TYLENOL PM EXTRA STRENGTH PO) Take  by mouth.     • sildenafil citrate (VIAGRA) 100 MG tablet Take 1 Tab by mouth as needed for Erectile Dysfunction. (Patient not taking: Reported on 1/18/2019) 5 Tab 3   • calcium carbonate (TUMS) 500 MG Chew Tab Take 2,000 mg by mouth every day.       No current facility-administered medications for this visit.     \"CURRENT RX\"    ALLERGIES: Brilinta [ticagrelor]    ROS    Constitutional: Denies fever, chills, sweats,  weight loss, fatigue.  Eyes: Denies vision loss, pain, drainage, double vision, glasses.  Ears/Nose/Mouth/Throat: Denies earache, difficulty hearing, rhinitis/nasal congestion, injury, recurrent sore throat, persistent hoarseness, decayed teeth/toothaches, ringing or buzzing in the ears.  Cardiovascular: Denies chest pain, tightness, palpitations, swelling in legs/feet, fainting, difficulty breathing when lying down but gets better when sitting up.   Respiratory: Denies shortness of breath, cough, sputum, wheezing, painful breathing, coughing up blood.   Sleep: per HPI  Gastrointestinal: Denies  difficulty swallowing, nausea, abdominal pain, diarrhea, constipation, heartburn.  Genitourinary: Denies  blood in urine, discharge, positive for frequent urination.   Musculoskeletal: Denies painful joints, sore muscles, back pain.   Integumentary: Denies rashes, lumps, color changes.   Neurological: Denies " "frequent headaches,weakness, dizziness.    PHYSICAL EXAM  Obese    /90 (BP Location: Right arm, Patient Position: Sitting, BP Cuff Size: Adult)   Pulse 93   Resp 16   Ht 1.803 m (5' 11\")   Wt 108.4 kg (239 lb)   SpO2 92%   BMI 33.33 kg/m²   Appearance: Well-nourished, well-developed, no acute distress  Eyes:  PERRLA, EOMI; glasses  ENMT: without lesions, deformities;hearing grossly intact; tongue normal, posterior pharynx without erythema or exudate; Mallampati classification: 4  Neck: Supple, trachea midline, no masses  Respiratory effort:  No intercostal retractions or use of accessory muscles  Lung auscultation:  No wheezes rhonchi rubs or rales  Cardiac: No murmurs, rubs, or gallops; regular rhythm, normal rate; no edema  Abdomen:  No tenderness, no organomegaly.  Obese  Musculoskeletal:  Grossly normal; gait and station normal; digits and nails normal  Skin:  No rashes, petechiae, cyanosis  Neurologic: without focal signs; oriented to person, time, place, and purpose; judgement intact  Psychiatric:  No depression, anxiety, agitation        PROBLEMS:  1. ROSALINO (obstructive sleep apnea)    - zolpidem (AMBIEN) 5 MG Tab; Take 1 Tab by mouth at bedtime as needed for up to 3 doses. Take 1-3 tabs prn  Dispense: 3 Tab; Refill: 0  - Polysomnography, 4 or More; Future    2. Obesity (BMI 30-39.9)    - OBESITY COUNSELING (No Charge): Patient identified as having weight management issue.  Appropriate orders and counseling given.    3. Former smoker      4. ST elevation myocardial infarction (STEMI) involving other coronary artery ([facility])      5. VT (ventricular tachycardia) ([facility])      6. Prediabetes      7. Mixed hyperlipidemia      8. Coronary artery disease involving native coronary artery of native heart without angina pectoris        PLAN:   The patient has signs and symptoms consistent with obstructive sleep apnea hypopnea syndrome. Will schedule to have a nocturnal polysomnogram using zolpidem to assist with " sleep onset and maintenance should the need arise. Will return after the results are available to determine further diagnostic needs and/or treatment options.    The risks of untreated sleep apnea were discussed with the patient at length. Patients with ROSALINO are at increased risk of cardiovascular disease including coronary artery disease, systemic arterial hypertension, pulmonary arterial hypertension, cardiac arrythmias, and stroke. ROSALINO patients have an increased risk of motor vehicle accidents, type 2 diabetes, chronic kidney disease, and non-alcoholic liver disease. The patient was advised to avoid driving a motor vehicle when drowsy.    Positive airway pressure, such as CPAP, is considered first-line and preferred therapy for sleep apnea and may reverse both symptoms and risks.    Have advised the patient to follow up with the appropriate healthcare practitioners for all other medical problems and issues.      Return for after sleep study.

## 2019-04-26 ENCOUNTER — SLEEP CENTER VISIT (OUTPATIENT)
Dept: SLEEP MEDICINE | Facility: MEDICAL CENTER | Age: 59
End: 2019-04-26
Payer: COMMERCIAL

## 2019-04-26 VITALS
HEART RATE: 93 BPM | RESPIRATION RATE: 16 BRPM | HEIGHT: 71 IN | OXYGEN SATURATION: 92 % | BODY MASS INDEX: 33.46 KG/M2 | WEIGHT: 239 LBS | SYSTOLIC BLOOD PRESSURE: 128 MMHG | DIASTOLIC BLOOD PRESSURE: 90 MMHG

## 2019-04-26 DIAGNOSIS — R73.03 PREDIABETES: ICD-10-CM

## 2019-04-26 DIAGNOSIS — Z87.891 FORMER SMOKER: ICD-10-CM

## 2019-04-26 DIAGNOSIS — I25.10 CORONARY ARTERY DISEASE INVOLVING NATIVE CORONARY ARTERY OF NATIVE HEART WITHOUT ANGINA PECTORIS: ICD-10-CM

## 2019-04-26 DIAGNOSIS — E66.9 OBESITY (BMI 30-39.9): ICD-10-CM

## 2019-04-26 DIAGNOSIS — I21.29 ST ELEVATION MYOCARDIAL INFARCTION (STEMI) INVOLVING OTHER CORONARY ARTERY (HCC): ICD-10-CM

## 2019-04-26 DIAGNOSIS — G47.33 OSA (OBSTRUCTIVE SLEEP APNEA): ICD-10-CM

## 2019-04-26 DIAGNOSIS — E78.2 MIXED HYPERLIPIDEMIA: ICD-10-CM

## 2019-04-26 DIAGNOSIS — I47.20 VT (VENTRICULAR TACHYCARDIA) (HCC): ICD-10-CM

## 2019-04-26 PROCEDURE — 99204 OFFICE O/P NEW MOD 45 MIN: CPT | Performed by: INTERNAL MEDICINE

## 2019-04-26 RX ORDER — ZOLPIDEM TARTRATE 5 MG/1
5 TABLET ORAL NIGHTLY PRN
Qty: 3 TAB | Refills: 0 | Status: SHIPPED | OUTPATIENT
Start: 2019-04-26 | End: 2019-05-27

## 2019-05-20 ENCOUNTER — SLEEP STUDY (OUTPATIENT)
Dept: SLEEP MEDICINE | Facility: MEDICAL CENTER | Age: 59
End: 2019-05-20
Attending: INTERNAL MEDICINE
Payer: COMMERCIAL

## 2019-05-20 DIAGNOSIS — G47.33 OSA (OBSTRUCTIVE SLEEP APNEA): ICD-10-CM

## 2019-05-20 PROCEDURE — 95811 POLYSOM 6/>YRS CPAP 4/> PARM: CPT | Performed by: FAMILY MEDICINE

## 2019-05-21 NOTE — PROCEDURES
Clinical Comments:    The patient underwent a split night polysomnogram with a CPAP titration using the standard montage for measurement of paramaters of sleep, respiratory events, movement abnormalities, heart rate and rhythm.  A Microphone was used to monitior snoring.    Interpretation:  Study start time was 08:19:13 PM.  Total recording time was 3h 55.5m (235 minutes) with a total sleep time of 2h 11.5m (131 minutes) resulting in a sleep efficiency of 55.84%.  Sleep latency from the start fo the study was 65 minutes minutes and REM latency from sleep onset was 00 minutes minutes.    Respiratory:   There were 24 apneas in total consisting of 24 obstructive apneas, 0 mixed apneas, and 0 central apneas.  There were 83 hypopneas in total.  The apnea index was 10.95 per hour and the hypopnea index was 37.87 per hour.  The overall AHI was 48.8, with a REM AHI of 0.00, and a supine AHI of 92.12.  61% of the respiratory events were central apneas    Limb Movements:  There were a total of 129 periodic leg movements, of which 12 were PLMS arousals.  This resulted in a PLMS index of 58.9 and a PLMS arousal index of 5.5    Oximetry:  The mean SaO2 was 89.0% for the diagnostic portion of the study, with a minimum SaO2 of 61.0%.    Treatment:    Interpretation:  Treatment recording time was 5h 20.0m (320 minutes) with a total sleep time of 4h 31.0m (271 minutes) resulting in a sleep efficiency of 84.7%.    Sleep latency from the start of treatment was 08 minutes minutes and REM latency from sleep onset was 0h 45.0m minutes.    The patient had 86 arousals in total for an arousal index of 19.0.    Respiratory:   There were 20 apneas in total consisting of  0 obstructive apneas, 20 central apneas, and 0 mixed apneas for an apnea index of 4.43.    The patient had 70 hypopneas in total, which resulted in a hypopnea index of 15.50.    The overall AHI was 19.93, with a REM AHI of 12.63, and a supine AHI of 103.33.     61% of the  respiratory events were central apneas    Limb Movements:  There were a total of 292 periodic leg movements, of which 11 were PLMS arousals.  This resulted in a PLMS index of 64.6 and a PLMS arousal index of 2.4.    Oximetry:  The mean SaO2 during treatment was 91.0%, with a minimum oxygen saturation of 82.0%.    CPAP was tried from 5 cm H2O to 11 cm H2O.      Impression:  1.  Severe obstructive sleep apnea with AHI of 48.8/hr and O2 matt 61 %. Due to severity of the disease he met the split study protocol. The titration started with CPAP 5 cm and the best tolerated was CPAP 11 cm. The AHI improved to 11.7/hr with improved O2 matt of 85% and average O2 saturation of 91 %.     2.  PLMS  3.  Complex sleep apnea       Recommendations:  No definitive pressure can be extrapolated from the titration, I recommend dedicated CPAP/BiPAP titration. In some cases alternative treatment options may prove effective in resolving sleep apnea and these options include upper airway surgery, the use of a dental orthotic or weight loss and positional therapy. Clinical correlation is required. In general patients with sleep apnea are advised to avoid alcohol and sedatives and to not operate a motor vehicle while drowsy and are at a greater risk for cardiovascular disease.

## 2019-05-28 ENCOUNTER — HOSPITAL ENCOUNTER (OUTPATIENT)
Dept: LAB | Facility: MEDICAL CENTER | Age: 59
End: 2019-05-28
Attending: INTERNAL MEDICINE
Payer: COMMERCIAL

## 2019-05-28 DIAGNOSIS — E78.2 MIXED HYPERLIPIDEMIA: ICD-10-CM

## 2019-05-28 DIAGNOSIS — I25.10 CORONARY ARTERY DISEASE INVOLVING NATIVE CORONARY ARTERY OF NATIVE HEART WITHOUT ANGINA PECTORIS: ICD-10-CM

## 2019-05-28 LAB
ALBUMIN SERPL BCP-MCNC: 4.5 G/DL (ref 3.2–4.9)
ALP SERPL-CCNC: 71 U/L (ref 30–99)
ALT SERPL-CCNC: 55 U/L (ref 2–50)
AST SERPL-CCNC: 25 U/L (ref 12–45)
BILIRUB CONJ SERPL-MCNC: 0.1 MG/DL (ref 0.1–0.5)
BILIRUB INDIRECT SERPL-MCNC: 0.7 MG/DL (ref 0–1)
BILIRUB SERPL-MCNC: 0.8 MG/DL (ref 0.1–1.5)
CHOLEST SERPL-MCNC: 160 MG/DL (ref 100–199)
FASTING STATUS PATIENT QL REPORTED: NORMAL
HDLC SERPL-MCNC: 34 MG/DL
LDLC SERPL CALC-MCNC: 76 MG/DL
PROT SERPL-MCNC: 7 G/DL (ref 6–8.2)
TRIGL SERPL-MCNC: 249 MG/DL (ref 0–149)

## 2019-05-28 PROCEDURE — 80076 HEPATIC FUNCTION PANEL: CPT

## 2019-05-28 PROCEDURE — 80061 LIPID PANEL: CPT

## 2019-05-28 PROCEDURE — 36415 COLL VENOUS BLD VENIPUNCTURE: CPT

## 2019-06-13 ENCOUNTER — SLEEP CENTER VISIT (OUTPATIENT)
Dept: SLEEP MEDICINE | Facility: MEDICAL CENTER | Age: 59
End: 2019-06-13
Payer: COMMERCIAL

## 2019-06-13 VITALS
WEIGHT: 239 LBS | DIASTOLIC BLOOD PRESSURE: 80 MMHG | HEIGHT: 71 IN | OXYGEN SATURATION: 95 % | SYSTOLIC BLOOD PRESSURE: 128 MMHG | HEART RATE: 85 BPM | BODY MASS INDEX: 33.46 KG/M2 | RESPIRATION RATE: 16 BRPM

## 2019-06-13 DIAGNOSIS — Z87.891 FORMER SMOKER: ICD-10-CM

## 2019-06-13 DIAGNOSIS — G47.00 INSOMNIA, UNSPECIFIED TYPE: ICD-10-CM

## 2019-06-13 DIAGNOSIS — I25.10 CORONARY ARTERY DISEASE INVOLVING NATIVE CORONARY ARTERY OF NATIVE HEART WITHOUT ANGINA PECTORIS: ICD-10-CM

## 2019-06-13 DIAGNOSIS — G47.33 OSA (OBSTRUCTIVE SLEEP APNEA): ICD-10-CM

## 2019-06-13 PROCEDURE — 99213 OFFICE O/P EST LOW 20 MIN: CPT | Performed by: NURSE PRACTITIONER

## 2019-06-13 RX ORDER — ZOLPIDEM TARTRATE 5 MG/1
5 TABLET ORAL NIGHTLY PRN
Qty: 2 TAB | Refills: 0 | Status: SHIPPED | OUTPATIENT
Start: 2019-06-13 | End: 2019-06-15

## 2019-06-13 ASSESSMENT — ENCOUNTER SYMPTOMS
EYE PAIN: 0
BRUISES/BLEEDS EASILY: 0
NEUROLOGICAL NEGATIVE: 1
RESPIRATORY NEGATIVE: 1
PSYCHIATRIC NEGATIVE: 1
CARDIOVASCULAR NEGATIVE: 1
EYE DISCHARGE: 0

## 2019-06-13 NOTE — PROGRESS NOTES
Chief Complaint   Patient presents with   • Apnea     Last Seen 04/26/19   • Results     Sleep Study 05/20/19         HPI: This patient is a 58 y.o. male, who presents for sleep study results.     He was initially referred by his cardiologist Dr. Federico Saldivar.  Patient has a history of STEMI complicated by VF arrest, S/P DENISSE to OM, HLD, HTN, obesity, anxiety and depression, former smoker, prediabetes.  Sleep symptoms include light snoring, pauses in breathing, sleep talking, resuscitative gasps, daytime fatigue.    PSG indicates severe obstructive sleep apnea with an AHI of 48.8, minimum oxygen saturation of 61 %.  He was titrated from CPAP 5 to 11 cm H2O.  No definite pressure can be extrapolated.  He was recommended he return for a dedicated night titration to CPAP/BiPAP.  Testing reviewed with patient.    Past Medical History:   Diagnosis Date   • Anxiety    • Bronchitis    • CAD (coronary artery disease)     STEMI-S/P DENISSE to OM   • Chickenpox    • Coronary heart disease    • Depression    • [place]iqu[place] measles    • Hyperlipidemia    • Hypertension    • Influenza    • Obesity    • Sleep apnea    • Ventricular tachycardia (HCC)     post STEMI    • Whooping cough        Social History   Substance Use Topics   • Smoking status: Former Smoker     Packs/day: 1.00     Years: 40.00     Types: Cigars     Quit date: 2016   • Smokeless tobacco: Former User     Types: Chew     Quit date: 2016      Comment: Occasional cigar   • Alcohol use Yes      Comment: 6-7/week       History reviewed. No pertinent family history.    Immunization History   Administered Date(s) Administered   • Influenza Vaccine Quad Inj (Pf) 10/03/2016       Current medications as of today   Current Outpatient Prescriptions   Medication Sig Dispense Refill   • DILTIAZem CD (CARDIZEM CD) 120 MG CAPSULE SR 24 HR Take 1 Cap by mouth every day. 90 Cap 3   • Phenylephrine HCl (AFRIN ALLERGY NA) Spray  in nose.     • Diphenhydramine-APAP, sleep, (TYLENOL PM  "EXTRA STRENGTH PO) Take  by mouth.     • lisinopril (PRINIVIL) 10 MG Tab TAKE 1 TAB BY MOUTH EVERY DAY. 90 Tab 3   • atorvastatin (LIPITOR) 80 MG tablet TAKE 1/2 A TABLET BY MOUTH AT BEDTIME 90 Tab 2   • sildenafil citrate (VIAGRA) 100 MG tablet Take 1 Tab by mouth as needed for Erectile Dysfunction. 5 Tab 3   • calcium carbonate (TUMS) 500 MG Chew Tab Take 2,000 mg by mouth every day.     • aspirin EC 81 MG EC tablet Take 1 Tab by mouth every day. 30 Tab 0   • acetaminophen (TYLENOL) 500 MG Tab Take 500-1,000 mg by mouth every 6 hours as needed.       No current facility-administered medications for this visit.        Allergies: Brilinta [ticagrelor]    /80 (BP Location: Left arm, Patient Position: Sitting, BP Cuff Size: Adult)   Pulse 85   Resp 16   Ht 1.803 m (5' 11\")   Wt 108.4 kg (239 lb)   SpO2 95%       Review of Systems   Constitutional: Positive for malaise/fatigue.   HENT: Negative.    Eyes: Negative for pain and discharge.   Respiratory: Negative.    Cardiovascular: Negative.    Neurological: Negative.    Endo/Heme/Allergies: Negative for environmental allergies. Does not bruise/bleed easily.   Psychiatric/Behavioral: Negative.        Physical Exam   Constitutional: He is oriented to person, place, and time and well-developed, well-nourished, and in no distress.   HENT:   Head: Normocephalic and atraumatic.   Eyes: Pupils are equal, round, and reactive to light.   Neck: Normal range of motion. Neck supple. No tracheal deviation present.   Pulmonary/Chest: Effort normal.   Musculoskeletal: Normal range of motion.   Neurological: He is alert and oriented to person, place, and time.   Skin: Skin is warm and dry.   Psychiatric: Mood, memory, affect and judgment normal.   Vitals reviewed.      Diagnoses/Plan:    1. ROSALINO (obstructive sleep apnea)  Reviewed sleep testing in detail with the patient.  We discussed titration study and all the details.  He is agreeable to this.  He is eager to initiate " therapy in hopes of feeling better.  - Polysomnography Titration; Future    2. Former smoker      3. Coronary artery disease involving native coronary artery of native heart without angina pectoris      4. Insomnia, unspecified type  Rx for Ambien provided for the night of his titration  - zolpidem (AMBIEN) 5 MG Tab; Take 1 Tab by mouth at bedtime as needed for Sleep for up to 2 days.  Dispense: 2 Tab; Refill: 0    Follow-up after testing to review results    This dictation was created using voice recognition software. The accuracy of the dictation is limited to the abilities of the software. I expect there may be some errors of grammar and possibly content.

## 2019-06-27 ENCOUNTER — SLEEP STUDY (OUTPATIENT)
Dept: SLEEP MEDICINE | Facility: MEDICAL CENTER | Age: 59
End: 2019-06-27
Attending: NURSE PRACTITIONER
Payer: COMMERCIAL

## 2019-06-27 DIAGNOSIS — G47.33 OSA (OBSTRUCTIVE SLEEP APNEA): ICD-10-CM

## 2019-06-27 PROCEDURE — 95811 POLYSOM 6/>YRS CPAP 4/> PARM: CPT | Performed by: INTERNAL MEDICINE

## 2019-06-28 NOTE — PROCEDURES
Comments:  The patient underwent a diagnostic polysomnogram using the standard montage for measurement of parameters of sleep, respiratory events, movement abnormalities, and heart rate and rhythm.    A microphone was used to monitor snoring.  Interpretation:  Study start time was 10:02:12 PM.  Diagnostic recording time was 7h 54.5m with a total sleep time of 5h 46.0m resulting in a sleep efficiency of 72.92%%.    Sleep latency from the start of the study was 29 minutes and the latency from sleep to REM was 78 minutes.  In total,104  arousals were scored for an arousal index of 18.0.  Respiratory:  There were a total of 24 apneas consisting of 2 obstructive apneas, 0 mixed apneas, and 22 central apneas.  A total of 115 hypopneas were scored.  The apnea index was 4.16 per hour and the hypopnea index was 19.94 per hour resulting in an overall AHI of 24.10.  AHI during rem was 26.4 and AHI while supine was 24.10.  Oximetry:  There was a mean oxygen saturation of 93.0% with a minimum oxygen saturation of 80.0%.  Time spent with oxygen saturations below 89% was 12.9 minutes.  Cardiac:  The highest heart rate seen while awake was 89 BPM while the highest heart rate during sleep was 84 BPM with an average sleeping heart rate of 70 BPM.  Limb Movements:  There were a total of 274 PLMs during sleep, of which 29 were PLMS arousals.  This resulted in a PLMS index of 47.5 and a PLMS arousal index of 5.0.  CPAP was tried from 9 cm H2O to 15 cm H2O.  BPAP was tried from 18/14 cm H2O to 22/18 cm H2O.    The sleep efficiency was 73%.  Sleep architecture showed a lack of stage III sleep.  Sleep latency was prolonged at 29 minutes.  There were elevated periodic limb movements with index 45/h.  No significant EKG abnormalities appreciated.  CPAP was started at 9 cm of water followed by bilevel therapy to 20/16 cm of water with biflex at 2, which resulted in AHI: 7.5 and normal oximetry.    RECORDING TECHNIQUE:       After the scalp  was prepared, gold plated electrodes were applied to the scalp according to the International 10-20 System. EEG (electroencephalogram) was continuously monitored from the O1-M2, O2-M1, C3-M2, C4-M1, F3-M2, and F4-M1.   EOGs (electrooculograms) were monitored by electrodes placed at the left and right outer canthi.  Chin EMG (electromyogram) was monitored by electrodes placed on the mentalis and sub-mentalis muscles.  Nasal and oral airflow were monitored using a triple port thermocouple as well as oronasal pressure transducer.  Respiratory effort was measured by inductive plethysmography technology employing abdominal and thoracic belts.  Blood oxygen saturation and pulse were monitored by pulse oximetry.  Heart rhythm was monitored by surface electrocardiogram.  Leg EMG was studied using surface electrodes placed on left and right anterior tibialis.  A microphone was used to monitor tracheal sounds and snoring.  Body position was monitored and documented by technician observation      SUMMARY:    This was an overnight positive airway pressure titration polysomnogram.  The patient chose to use a medium Simplus mask and heated humidification.     The total recording time was 474 minutes, the sleep period time was 405 minutes, and the total sleep time was 346 minutes.  The patient's sleep efficiency was 72.92 % which is reduced.  The patient experienced an increased 8 REM period(s).    The sleep stage durations revealed 59.5 minutes of wake after sleep onset (WASO), 11.5 minutes of N1 sleep, 259.5 minutes of N2 sleep, 0 minutes of N3 sleep, and 75 minutes of REM.    The latency to sleep was 29 minutes which is prolonged.  The latency to REM was 1 hour 18 minutes which is normal.  Mild sleep fragmentation occurred.  The arousal index was 18.  The Total Limb Movement (isolated) Index was 5.9, the Limb Movement with Arousal Index was 5.4, and the PLM Series Index was 45.4.    The patient experienced 22 central and 2  obstructive apneas, 39 central and 76 obstructive hypopneas, 139 apneas and hypopneas, and 2 RERAS.  The apnea hypopnea index was 24.1, the RDI was 24.5, the mean event duration was 20.0 seconds, and the longest event lasted 53.1 seconds.  The REM index was 5.9 and the supine index was 24.6.  The apnea hypopnea index represents moderate sleep apnea hypopnea syndrome during the PAP titration.    The matt saturation during sleep was 80 % and the patient spent 10.0 % of the recording with saturations less than or equal to 90%.    During sleep, the minimum heart rate was 58 bpm, the mean heart rate was 70 bpm, and the maximum heart rate was 84 bpm.    The technician initiated treatment with CPAP cmH2O and increased the pressure to a maximum of 15 cmH2O. the apnea hypopnea index failed to normalize on any tested CPAP pressure.  Central apneas comprised 15.8% of the events.  The technician placed the patient on bilevel when CPAP proved ineffective.    The patient did best on bilevel 20/16 cm water with a resultant AHI of 7.5, a minimum saturation of 92.0 %, and a mean saturation of 94.0 %.  Patient was able to achieve both supine and REM sleep on bilevel 20/16 cm water    ASSESSMENT:    Incomplete bilevel titration to a best pressure of 20/16 cm water with a resultant AHI of 7.5, a matt saturation of 92%, mean saturation 94%, and the achievement of both supine and REM sleep.        RECOMMENDATION:    Recommend bilevel 24/16 cmH2O using a medium Simplus and heated humidification followed by data card and clinical review in 6-8 weeks.

## 2019-07-02 NOTE — PROGRESS NOTES
CC: Follow up for PAP results    HPI:  Mr. Yves Ivory is a 58-year-old male kindly referred by Dr. Federico Bianchi MD. His 5/20/2019 PSG showed an AHI 49.8 with a matt saturation of 61%.  He had an incomplete CPAP titration which prompted a 6/27/2019 dedicated full night positive airway pressure titration.    The patient did best on bilevel 20/16 cm H2O with Bi-flex (pressure relief on exhalation) at 2 with a resultant AHI of 7.5 and normal oximetry.  The patient was able to achieve both supine and REM sleep on the setting but not supine REM.      Patient Active Problem List    Diagnosis Date Noted   • Obesity (BMI 30-39.9) 12/12/2016     Priority: Medium   • Coronary artery disease involving native coronary artery of native heart without angina pectoris 07/26/2016     Priority: Medium   • HLD (hyperlipidemia) 07/26/2016     Priority: Medium   • VT (ventricular tachycardia) (HCC) 07/26/2016     Priority: Medium   • STEMI (ST elevation myocardial infarction) ([facility]) 07/07/2016     Priority: Medium   • Abdominal gas pain 12/12/2016     Priority: Low   • Preventative health care 12/12/2016     Priority: Low   • Cough 12/12/2016     Priority: Low   • Cigar smoker 08/22/2016     Priority: Low   • Anxiety 08/22/2016     Priority: Low   • ROSALINO (obstructive sleep apnea) 04/25/2019   • Former smoker 04/25/2019   • Prediabetes 04/22/2018   • Diarrhea 08/03/2017       Past Medical History:   Diagnosis Date   • Anxiety    • Bronchitis    • CAD (coronary artery disease)     STEMI-S/P DENISSE to OM   • Chickenpox    • Coronary heart disease    • Depression    • [place] measles    • Hyperlipidemia    • Hypertension    • Influenza    • Obesity    • Sleep apnea    • Ventricular tachycardia (HCC)     post STEMI    • Whooping cough         Past Surgical History:   Procedure Laterality Date   • ANGIOPLASTY     • APPENDECTOMY CHILD     • TONSILLECTOMY     • ZZZ CARDIAC CATH         History reviewed. No pertinent family  "history.    Social History     Social History   • Marital status:      Spouse name: N/A   • Number of children: N/A   • Years of education: N/A     Occupational History   • Not on file.     Social History Main Topics   • Smoking status: Former Smoker     Packs/day: 1.00     Years: 40.00     Types: Cigars     Quit date: 2016   • Smokeless tobacco: Former User     Types: Chew     Quit date: 2016      Comment: Occasional cigar   • Alcohol use Yes      Comment: 6-7/week   • Drug use: Yes     Types: Marijuana      Comment: PRN   • Sexual activity: Yes     Partners: Female     Other Topics Concern   • Not on file     Social History Narrative   • No narrative on file       Current Outpatient Prescriptions   Medication Sig Dispense Refill   • DILTIAZem CD (CARDIZEM CD) 120 MG CAPSULE SR 24 HR Take 1 Cap by mouth every day. 90 Cap 3   • Phenylephrine HCl (AFRIN ALLERGY NA) Spray  in nose.     • Diphenhydramine-APAP, sleep, (TYLENOL PM EXTRA STRENGTH PO) Take  by mouth.     • lisinopril (PRINIVIL) 10 MG Tab TAKE 1 TAB BY MOUTH EVERY DAY. 90 Tab 3   • atorvastatin (LIPITOR) 80 MG tablet TAKE 1/2 A TABLET BY MOUTH AT BEDTIME 90 Tab 2   • sildenafil citrate (VIAGRA) 100 MG tablet Take 1 Tab by mouth as needed for Erectile Dysfunction. 5 Tab 3   • calcium carbonate (TUMS) 500 MG Chew Tab Take 2,000 mg by mouth every day.     • aspirin EC 81 MG EC tablet Take 1 Tab by mouth every day. 30 Tab 0   • acetaminophen (TYLENOL) 500 MG Tab Take 500-1,000 mg by mouth every 6 hours as needed.       No current facility-administered medications for this visit.     \"CURRENT RX\"    ALLERGIES: Brilinta [ticagrelor]    ROS    Constitutional: Denies fever, chills, sweats,  weight loss, fatigue  Cardiovascular: Denies chest pain, tightness, palpitations, swelling in legs/feet, fainting, difficulty breathing when lying down but gets better when sitting up.   Respiratory: Denies shortness of breath, cough, sputum, wheezing, painful breathing, " "coughing up blood.   Sleep: per HPI  Gastrointestinal: Denies  difficulty swallowing, nausea, abdominal pain, diarrhea, constipation, heartburn.  Musculoskeletal: Denies painful joints, sore muscles, back pain.        PHYSICAL EXAM  Obese    /74 (BP Location: Right arm, Patient Position: Sitting, BP Cuff Size: Large adult)   Pulse 85   Resp 16   Ht 1.803 m (5' 11\")   Wt 108.4 kg (239 lb)   SpO2 94%   BMI 33.33 kg/m²   Appearance: Well-nourished, well-developed, no acute distress  Eyes:  PERRLA, EOMI  ENMT: without lesions, deformities;hearing grossly intact; tongue normal, posterior pharynx without erythema or exudate; Mallampati classification: 4  Neck: Supple, trachea midline, no masses  Respiratory effort:  No intercostal retractions or use of accessory muscles  Lung auscultation:  No wheezes rhonchi rubs or rales  Cardiac: No murmurs, rubs, or gallops; regular rhythm, normal rate; no edema  Abdomen:  No tenderness, no organomegaly.  Obese  Musculoskeletal:  Grossly normal; gait and station normal; digits and nails normal  Skin:  No rashes, petechiae, cyanosis  Neurologic: without focal signs; oriented to person, time, place, and purpose; judgement intact  Psychiatric:  No depression, anxiety, agitation        PROBLEMS:  1. ROSALINO (obstructive sleep apnea)    - DME BiPAP    2. Obesity (BMI 30-39.9)    - OBESITY COUNSELING (No Charge): Patient identified as having weight management issue.  Appropriate orders and counseling given.    3. Former smoker      4. Mixed hyperlipidemia      5. ST elevation myocardial infarction (STEMI) involving other coronary artery (HCC)      6. Prediabetes      7. Coronary artery disease involving native coronary artery of native heart without angina pectoris            PLAN:   The patient did best on bilevel 20/16 cm H2O with Bi-flex (pressure relief on exhalation) at 2 with a resultant AHI of 7.5 and normal oximetry.  The patient was able to achieve both supine and REM sleep " on the setting but not supine REM.    Will prescribe the aforementioned settings using a medium Simplus mask and heated humidification followed by data card download and clinical review in approximately 10 weeks.    Return in about 10 weeks (around 9/11/2019).

## 2019-07-03 ENCOUNTER — SLEEP CENTER VISIT (OUTPATIENT)
Dept: SLEEP MEDICINE | Facility: MEDICAL CENTER | Age: 59
End: 2019-07-03
Payer: COMMERCIAL

## 2019-07-03 VITALS
HEIGHT: 71 IN | HEART RATE: 85 BPM | SYSTOLIC BLOOD PRESSURE: 108 MMHG | OXYGEN SATURATION: 94 % | RESPIRATION RATE: 16 BRPM | WEIGHT: 239 LBS | DIASTOLIC BLOOD PRESSURE: 74 MMHG | BODY MASS INDEX: 33.46 KG/M2

## 2019-07-03 DIAGNOSIS — E78.2 MIXED HYPERLIPIDEMIA: ICD-10-CM

## 2019-07-03 DIAGNOSIS — G47.33 OSA (OBSTRUCTIVE SLEEP APNEA): ICD-10-CM

## 2019-07-03 DIAGNOSIS — I25.10 CORONARY ARTERY DISEASE INVOLVING NATIVE CORONARY ARTERY OF NATIVE HEART WITHOUT ANGINA PECTORIS: ICD-10-CM

## 2019-07-03 DIAGNOSIS — E66.9 OBESITY (BMI 30-39.9): ICD-10-CM

## 2019-07-03 DIAGNOSIS — I21.29 ST ELEVATION MYOCARDIAL INFARCTION (STEMI) INVOLVING OTHER CORONARY ARTERY (HCC): ICD-10-CM

## 2019-07-03 DIAGNOSIS — R73.03 PREDIABETES: ICD-10-CM

## 2019-07-03 DIAGNOSIS — Z87.891 FORMER SMOKER: ICD-10-CM

## 2019-07-03 PROCEDURE — 99214 OFFICE O/P EST MOD 30 MIN: CPT | Performed by: INTERNAL MEDICINE

## 2019-09-12 ENCOUNTER — SLEEP CENTER VISIT (OUTPATIENT)
Dept: SLEEP MEDICINE | Facility: MEDICAL CENTER | Age: 59
End: 2019-09-12
Payer: COMMERCIAL

## 2019-09-12 VITALS
HEART RATE: 86 BPM | BODY MASS INDEX: 33.46 KG/M2 | HEIGHT: 71 IN | SYSTOLIC BLOOD PRESSURE: 122 MMHG | WEIGHT: 239 LBS | RESPIRATION RATE: 16 BRPM | DIASTOLIC BLOOD PRESSURE: 86 MMHG | OXYGEN SATURATION: 93 %

## 2019-09-12 DIAGNOSIS — G47.33 OSA (OBSTRUCTIVE SLEEP APNEA): ICD-10-CM

## 2019-09-12 PROCEDURE — 99213 OFFICE O/P EST LOW 20 MIN: CPT | Performed by: NURSE PRACTITIONER

## 2019-09-12 ASSESSMENT — ENCOUNTER SYMPTOMS
GASTROINTESTINAL NEGATIVE: 1
PSYCHIATRIC NEGATIVE: 1
RESPIRATORY NEGATIVE: 1
EYE PAIN: 0
MUSCULOSKELETAL NEGATIVE: 1
CONSTITUTIONAL NEGATIVE: 1
NEUROLOGICAL NEGATIVE: 1
BRUISES/BLEEDS EASILY: 0
EYE DISCHARGE: 0
CARDIOVASCULAR NEGATIVE: 1

## 2019-09-12 NOTE — PROGRESS NOTES
Chief Complaint   Patient presents with   • Apnea     Last Seen 07/03/19         HPI: This patient is a 59 y.o. male, who presents for follow-up of ROSALINO with compliance check.     He was initially referred by his cardiologist Dr. Federico Saldivar.  Patient has a history of STEMI complicated by VF arrest, S/P DENISSE to OM, HLD, HTN, obesity, anxiety and depression, former smoker, prediabetes.     PSG indicates severe obstructive sleep apnea with an AHI of 48.8, minimum oxygen saturation of 61 %.  He was titrated from CPAP 5 to 11 cm H2O.  No definite pressure can be extrapolated.    Return for dedicated night titration of BiPAP.  He did best with bilevel 20/16 cm H2O.  He was ordered therapy after his visit in July. Compliance download over the past 30 days indicates 83 % compliance, average use of 5 hours 25 minutes per night, AHI of 4. Patient reports he is slowly acclimating to therapy.  He feels he is getting a better sleep with therapy.  At times the pressure will ramp up and wake him up.  Other times the mask will leak and disrupt his sleep but overall feels he is adjusting well.  Again he does report benefit and feels more rested.      Past Medical History:   Diagnosis Date   • Anxiety    • Bronchitis    • CAD (coronary artery disease)     STEMI-S/P DENISSE to OM   • Chickenpox    • Coronary heart disease    • Depression    • [place] measles    • Hyperlipidemia    • Hypertension    • Influenza    • Obesity    • Sleep apnea    • Ventricular tachycardia (HCC)     post STEMI    • Whooping cough        Social History     Tobacco Use   • Smoking status: Former Smoker     Packs/day: 1.00     Years: 40.00     Pack years: 40.00     Types: Cigars     Last attempt to quit: 2016     Years since quitting: 3.6   • Smokeless tobacco: Former User     Types: Chew     Quit date: 2016   • Tobacco comment: Occasional cigar   Substance Use Topics   • Alcohol use: Yes     Comment: 6-7/week   • Drug use: Yes     Types: Marijuana     Comment:  "PRN       History reviewed. No pertinent family history.    Immunization History   Administered Date(s) Administered   • Influenza Vaccine Quad Inj (Pf) 10/03/2016       Current medications as of today   Current Outpatient Medications   Medication Sig Dispense Refill   • DILTIAZem CD (CARDIZEM CD) 120 MG CAPSULE SR 24 HR Take 1 Cap by mouth every day. 90 Cap 3   • Phenylephrine HCl (AFRIN ALLERGY NA) Spray  in nose.     • Diphenhydramine-APAP, sleep, (TYLENOL PM EXTRA STRENGTH PO) Take  by mouth.     • lisinopril (PRINIVIL) 10 MG Tab TAKE 1 TAB BY MOUTH EVERY DAY. 90 Tab 3   • atorvastatin (LIPITOR) 80 MG tablet TAKE 1/2 A TABLET BY MOUTH AT BEDTIME 90 Tab 2   • sildenafil citrate (VIAGRA) 100 MG tablet Take 1 Tab by mouth as needed for Erectile Dysfunction. 5 Tab 3   • calcium carbonate (TUMS) 500 MG Chew Tab Take 2,000 mg by mouth every day.     • acetaminophen (TYLENOL) 500 MG Tab Take 500-1,000 mg by mouth every 6 hours as needed.     • aspirin EC 81 MG EC tablet Take 1 Tab by mouth every day. 30 Tab 0     No current facility-administered medications for this visit.        Allergies: Brilinta [ticagrelor]    /86 (BP Location: Left arm, Patient Position: Sitting, BP Cuff Size: Adult)   Pulse 86   Resp 16   Ht 1.803 m (5' 11\")   Wt 108.4 kg (239 lb)   SpO2 93%       Review of Systems   Constitutional: Negative.    HENT: Negative.    Eyes: Negative for pain and discharge.   Respiratory: Negative.    Cardiovascular: Negative.    Gastrointestinal: Negative.    Musculoskeletal: Negative.    Skin: Negative.    Neurological: Negative.    Endo/Heme/Allergies: Negative for environmental allergies. Does not bruise/bleed easily.   Psychiatric/Behavioral: Negative.        Physical Exam   Constitutional: He is oriented to person, place, and time and well-developed, well-nourished, and in no distress.   HENT:   Head: Normocephalic and atraumatic.   Eyes: Pupils are equal, round, and reactive to light.   Neck: Normal " range of motion. Neck supple. No tracheal deviation present.   Pulmonary/Chest: Effort normal.   Musculoskeletal: Normal range of motion.   Neurological: He is alert and oriented to person, place, and time. Gait normal.   Skin: Skin is warm and dry.   Psychiatric: Mood, memory, affect and judgment normal.       Diagnoses/Plan:    1. ROSALINO (obstructive sleep apnea)  Patient is acclimating well to therapy.  He does notice benefit with therapy.  He will continue BiPAP therapy nightly, clean mask and tubing weekly, replace supplies as insurance will allow.  Follow-up in 6 months for compliance check.    This dictation was created using voice recognition software. The accuracy of the dictation is limited to the abilities of the software. I expect there may be some errors of grammar and possibly content.

## 2019-10-08 ENCOUNTER — OFFICE VISIT (OUTPATIENT)
Dept: MEDICAL GROUP | Facility: PHYSICIAN GROUP | Age: 59
End: 2019-10-08
Payer: COMMERCIAL

## 2019-10-08 VITALS
TEMPERATURE: 97.8 F | HEART RATE: 84 BPM | HEIGHT: 71 IN | WEIGHT: 248 LBS | SYSTOLIC BLOOD PRESSURE: 138 MMHG | BODY MASS INDEX: 34.72 KG/M2 | OXYGEN SATURATION: 94 % | DIASTOLIC BLOOD PRESSURE: 94 MMHG

## 2019-10-08 DIAGNOSIS — Z23 NEED FOR VACCINATION: ICD-10-CM

## 2019-10-08 DIAGNOSIS — G47.33 OSA (OBSTRUCTIVE SLEEP APNEA): ICD-10-CM

## 2019-10-08 DIAGNOSIS — Z12.11 SCREEN FOR COLON CANCER: ICD-10-CM

## 2019-10-08 DIAGNOSIS — N52.9 ERECTILE DYSFUNCTION, UNSPECIFIED ERECTILE DYSFUNCTION TYPE: ICD-10-CM

## 2019-10-08 DIAGNOSIS — I25.10 CORONARY ARTERY DISEASE INVOLVING NATIVE CORONARY ARTERY OF NATIVE HEART WITHOUT ANGINA PECTORIS: ICD-10-CM

## 2019-10-08 DIAGNOSIS — E66.9 OBESITY (BMI 30-39.9): ICD-10-CM

## 2019-10-08 PROBLEM — R19.7 DIARRHEA: Status: RESOLVED | Noted: 2017-08-03 | Resolved: 2019-10-08

## 2019-10-08 PROCEDURE — 90715 TDAP VACCINE 7 YRS/> IM: CPT | Performed by: FAMILY MEDICINE

## 2019-10-08 PROCEDURE — 90472 IMMUNIZATION ADMIN EACH ADD: CPT | Performed by: FAMILY MEDICINE

## 2019-10-08 PROCEDURE — 90686 IIV4 VACC NO PRSV 0.5 ML IM: CPT | Performed by: FAMILY MEDICINE

## 2019-10-08 PROCEDURE — 90471 IMMUNIZATION ADMIN: CPT | Performed by: FAMILY MEDICINE

## 2019-10-08 PROCEDURE — 99396 PREV VISIT EST AGE 40-64: CPT | Mod: 25 | Performed by: FAMILY MEDICINE

## 2019-10-08 RX ORDER — SILDENAFIL 100 MG/1
50 TABLET, FILM COATED ORAL PRN
Qty: 10 TAB | Refills: 1 | Status: SHIPPED | OUTPATIENT
Start: 2019-10-08 | End: 2020-07-14 | Stop reason: SDUPTHER

## 2019-10-08 SDOH — HEALTH STABILITY: MENTAL HEALTH: HOW MANY STANDARD DRINKS CONTAINING ALCOHOL DO YOU HAVE ON A TYPICAL DAY?: 5 OR 6

## 2019-10-08 SDOH — HEALTH STABILITY: MENTAL HEALTH: HOW OFTEN DO YOU HAVE 6 OR MORE DRINKS ON ONE OCCASION?: WEEKLY

## 2019-10-08 SDOH — HEALTH STABILITY: MENTAL HEALTH: HOW OFTEN DO YOU HAVE A DRINK CONTAINING ALCOHOL?: 2-4 TIMES A MONTH

## 2019-10-08 ASSESSMENT — PATIENT HEALTH QUESTIONNAIRE - PHQ9: CLINICAL INTERPRETATION OF PHQ2 SCORE: 0

## 2019-10-08 ASSESSMENT — PAIN SCALES - GENERAL: PAINLEVEL: 5=MODERATE PAIN

## 2019-10-09 NOTE — PROGRESS NOTES
CC:Diagnoses of Erectile dysfunction, unspecified erectile dysfunction type, Need for vaccination, Screen for colon cancer, Coronary artery disease involving native coronary artery of native heart without angina pectoris, ROSALINO (obstructive sleep apnea), and Obesity (BMI 30-39.9) were pertinent to this visit.      HISTORY OF PRESENT ILLNESS: Patient is a 59 y.o. male established patient who presents today to establish new PCP.    Health Maintenance: Flu vaccine and Tdap given today.    Coronary artery disease involving native coronary artery of native heart without angina pectoris  Chronic ongoing medical condition.  Patient with history of coronary artery disease and history of heart attack.  Currently taking atorvastatin 40 mg once daily and aspirin 81 mg.  Currently following with cardiology.  No current symptoms of angina, dyspnea on exertion, diaphoresis.    ROSALINO (obstructive sleep apnea)  Chronic ongoing medical condition.  Patient currently following with sleep medicine Dr. Renteria.  Currently on BiPAP and patient is noticing significant improvement in ROSALINO.    Obesity (BMI 30-39.9)  Chronic ongoing medical condition.  Physical exertion minimal.  Counseled on increasing cardiovascular exercise.  Diet is suboptimal.  Counseled on diet, exercise, and lifestyle changes to help with weight loss.      Patient Active Problem List    Diagnosis Date Noted   • Obesity (BMI 30-39.9) 12/12/2016     Priority: Medium   • Coronary artery disease involving native coronary artery of native heart without angina pectoris 07/26/2016     Priority: Medium   • HLD (hyperlipidemia) 07/26/2016     Priority: Medium   • VT (ventricular tachycardia) ([facility]) 07/26/2016     Priority: Medium   • STEMI (ST elevation myocardial infarction) ([facility]) 07/07/2016     Priority: Medium   • Abdominal gas pain 12/12/2016     Priority: Low   • Preventative health care 12/12/2016     Priority: Low   • Cough 12/12/2016     Priority: Low   • Cigar smoker  08/22/2016     Priority: Low   • Anxiety 08/22/2016     Priority: Low   • ROSALINO (obstructive sleep apnea) 04/25/2019   • Former smoker 04/25/2019   • Prediabetes 04/22/2018      Allergies:Brilinta [ticagrelor]    Current Outpatient Medications   Medication Sig Dispense Refill   • Calcium Carbonate Antacid (SHAMA-SELTZER ANTACID PO) Take  by mouth.     • sildenafil citrate (VIAGRA) 100 MG tablet Take 0.5 Tabs by mouth as needed for Erectile Dysfunction. 10 Tab 1   • DILTIAZem CD (CARDIZEM CD) 120 MG CAPSULE SR 24 HR Take 1 Cap by mouth every day. 90 Cap 3   • Phenylephrine HCl (AFRIN ALLERGY NA) Spray  in nose.     • Diphenhydramine-APAP, sleep, (TYLENOL PM EXTRA STRENGTH PO) Take  by mouth.     • lisinopril (PRINIVIL) 10 MG Tab TAKE 1 TAB BY MOUTH EVERY DAY. 90 Tab 3   • atorvastatin (LIPITOR) 80 MG tablet TAKE 1/2 A TABLET BY MOUTH AT BEDTIME 90 Tab 2   • acetaminophen (TYLENOL) 500 MG Tab Take 500-1,000 mg by mouth every 6 hours as needed.     • aspirin EC 81 MG EC tablet Take 1 Tab by mouth every day. 30 Tab 0   • calcium carbonate (TUMS) 500 MG Chew Tab Take 2,000 mg by mouth every day.       No current facility-administered medications for this visit.        Social History     Tobacco Use   • Smoking status: Former Smoker     Packs/day: 1.00     Years: 40.00     Pack years: 40.00     Types: Cigars     Last attempt to quit: 2016     Years since quitting: 3.7   • Smokeless tobacco: Former User     Types: Chew     Quit date: 2016   • Tobacco comment: Occasional cigar   Substance Use Topics   • Alcohol use: Yes     Frequency: 2-4 times a month     Drinks per session: 5 or 6     Binge frequency: Weekly     Comment: 6-7/week   • Drug use: Yes     Types: Marijuana     Comment: weekly     Social History     Social History Narrative   • Not on file       Family History   Problem Relation Age of Onset   • Cancer Mother         breast cancer   • Heart Disease Father 38        heart transplant, 3 MI's, multiple bypass   •  "Stroke Maternal Grandmother        Review of Systems:    Resp: No Shortness of breath  CV: No Chest pain  GI: No Nausea/Vomiting  MSK: No weakness    All remaining systems reviewed and found to be negative, except as stated above.    Exam:    /94 (BP Location: Left arm, Patient Position: Sitting, BP Cuff Size: Adult)   Pulse 84   Temp 36.6 °C (97.8 °F)   Ht 1.803 m (5' 11\")   Wt 112.5 kg (248 lb)   SpO2 94%  Body mass index is 34.59 kg/m².    General:  Well nourished, well developed male in NAD  HENT: Atraumatic, normocephalic  EYES: Extraocular movements intact, pupils equal and reactive to light  NECK: Supple, FROM  CHEST: No deformities, Equal chest expansion  RESP: Unlabored, no stridor or audible wheeze  ABD: Soft, Nontender, Non-Distended  Extremities: No Clubbing, Cyanosis, or Edema  Skin: Warm/dry, without rashes  Neuro: A/O x 4, CN 2-12 Grossly intact, Motor/sensory grossly intact  Psych: Normal behavior, normal affect      LABS: 5/28/2019: Results reviewed and discussed with the patient, questions answered.      Assessment/Plan:  1. Erectile dysfunction, unspecified erectile dysfunction type  New problem to examiner.  Sildenafil refilled as below.  - sildenafil citrate (VIAGRA) 100 MG tablet; Take 0.5 Tabs by mouth as needed for Erectile Dysfunction.  Dispense: 10 Tab; Refill: 1    2. Need for vaccination  - Tdap Vaccine =>8YO IM  - Influenza Vaccine Quad Injection (PF)    3. Screen for colon cancer  - REFERRAL TO GI FOR COLONOSCOPY    4. Coronary artery disease involving native coronary artery of native heart without angina pectoris  Chronic stable medical condition.  Continue continue taking atorvastatin 40 mg nightly, aspirin 81 mg daily, diltiazem 120 mg daily, lisinopril 10 mg daily.    5. ROSALINO (obstructive sleep apnea)  Chronic stable medical condition.  Continue follow-up with sleep medicine.  Continue BiPAP use with good compliance.    6. Obesity (BMI 30-39.9)  Chronic stable medical " condition.  Counseled on diet, exercise, and lifestyle changes to help with weight loss.  Patient education materials provided today.    Follow-up in 3 months    Please note that this dictation was created using voice recognition software. I have worked with consultants from the vendor as well as technical experts from [employer] [facility] [employer] to optimize the interface. I have made every reasonable attempt to correct obvious errors, but I expect that there are errors of grammar and possibly content that I did not discover before finalizing the note.

## 2019-10-09 NOTE — ASSESSMENT & PLAN NOTE
Chronic ongoing medical condition.  Physical exertion minimal.  Counseled on increasing cardiovascular exercise.  Diet is suboptimal.  Counseled on diet, exercise, and lifestyle changes to help with weight loss.

## 2019-10-09 NOTE — ASSESSMENT & PLAN NOTE
Chronic ongoing medical condition.  Patient currently following with sleep medicine Dr. Renteria.  Currently on BiPAP and patient is noticing significant improvement in ROSALINO.

## 2019-10-09 NOTE — ASSESSMENT & PLAN NOTE
Chronic ongoing medical condition.  Patient with history of coronary artery disease and history of heart attack.  Currently taking atorvastatin 40 mg once daily and aspirin 81 mg.  Currently following with cardiology.  No current symptoms of angina, dyspnea on exertion, diaphoresis.

## 2019-12-15 ENCOUNTER — APPOINTMENT (OUTPATIENT)
Dept: RADIOLOGY | Facility: MEDICAL CENTER | Age: 59
End: 2019-12-15
Attending: EMERGENCY MEDICINE
Payer: COMMERCIAL

## 2019-12-15 ENCOUNTER — HOSPITAL ENCOUNTER (EMERGENCY)
Facility: MEDICAL CENTER | Age: 59
End: 2019-12-15
Attending: EMERGENCY MEDICINE
Payer: COMMERCIAL

## 2019-12-15 VITALS
SYSTOLIC BLOOD PRESSURE: 122 MMHG | OXYGEN SATURATION: 96 % | BODY MASS INDEX: 32.78 KG/M2 | RESPIRATION RATE: 18 BRPM | HEART RATE: 86 BPM | DIASTOLIC BLOOD PRESSURE: 86 MMHG | TEMPERATURE: 97.2 F | WEIGHT: 235 LBS

## 2019-12-15 DIAGNOSIS — M25.562 ACUTE PAIN OF LEFT KNEE: ICD-10-CM
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
## 2019-12-15 PROCEDURE — 73564 X-RAY EXAM KNEE 4 OR MORE: CPT | Mod: LT

## 2019-12-15 PROCEDURE — 700102 HCHG RX REV CODE 250 W/ 637 OVERRIDE(OP): Performed by: EMERGENCY MEDICINE

## 2019-12-15 PROCEDURE — 99285 EMERGENCY DEPT VISIT HI MDM: CPT

## 2019-12-15 PROCEDURE — A9270 NON-COVERED ITEM OR SERVICE: HCPCS | Performed by: EMERGENCY MEDICINE

## 2019-12-15 RX ORDER — HYDROCODONE BITARTRATE AND ACETAMINOPHEN 5; 325 MG/1; MG/1
1 TABLET ORAL EVERY 6 HOURS PRN
Qty: 15 TAB | Refills: 0 | Status: SHIPPED | OUTPATIENT
Start: 2019-12-15 | End: 2019-12-18

## 2019-12-15 RX ORDER — HYDROCODONE BITARTRATE AND ACETAMINOPHEN 5; 325 MG/1; MG/1
1 TABLET ORAL ONCE
Status: COMPLETED | OUTPATIENT
Start: 2019-12-15 | End: 2019-12-15

## 2019-12-15 RX ADMIN — HYDROCODONE BITARTRATE AND ACETAMINOPHEN 1 TABLET: 5; 325 TABLET ORAL at 14:59

## 2019-12-15 ASSESSMENT — LIFESTYLE VARIABLES
DOES PATIENT WANT TO STOP DRINKING: NO
DO YOU DRINK ALCOHOL: NO

## 2019-12-15 NOTE — DISCHARGE INSTRUCTIONS
Take Motrin as needed for pain and inflammation    Take Norco as needed for pain that is not controlled by Motrin    Apply ice, wear immobilizer, and rest    Follow-up with your primary care provider and orthopedics for recheck and possible MRI imaging    Return to ER for worsening symptoms    You are being prescribed a narcotic. Narcotics are addictive. Please take the narcotic sparingly and only as needed for pain. Do not drink alcohol, operate heavy machinery, or drive while taking a narcotic as it may impair your judgment and motor skills. If the narcotic contains acetaminophen, you should not take other acetaminophen containing products, such as Tylenol, while taking this medication as it may affect your liver.

## 2019-12-15 NOTE — ED PROVIDER NOTES
ED Provider Note    Scribed for Kely Gavin M.D. by Farnaz Mcnamara. 12/15/2019, 2:39 PM.    Primary care provider: Enrico De La Torre M.D.  Means of arrival: Ambulance  History obtained from: patient  History limited by: none    CHIEF COMPLAINT  Chief Complaint   Patient presents with   • T-5000 FALL   • Knee Pain       HPI  Yves Ivory is a 59 y.o. 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 Patient describes that he was up painting on the ladder and when he began to descend he lost his footing and fell to his drive way. He notes that he fell on his bottom and hit his head on the concrete drive way. HE has associated lower back pain and left knee pain and endorses decreased range of motion secondary to pain. Denies loss of consciousness, numbness, weakness, chest pain, difficulty breathing, or neck pain.     REVIEW OF SYSTEMS  Pertinent positives include low back pain and right knee pain. Pertinent negatives include no loss of consciousness, numbness, weakness, chest pain, difficulty breathing, or neck pain. See HPI for further details. All other systems reviewed and negative.     PAST MEDICAL HISTORY   has a past medical history of Anxiety, Bronchitis, CAD (coronary artery disease), Chickenpox, Coronary heart disease, Depression, [place] measles, Hyperlipidemia, Hypertension, Influenza, Obesity, Sleep apnea, Ventricular tachycardia (HCC), and Whooping cough.    SURGICAL HISTORY   has a past surgical history that includes zzz cardiac cath; angioplasty; appendectomy child; and tonsillectomy.    SOCIAL HISTORY  Social History     Tobacco Use   • Smoking status: Former Smoker     Packs/day: 1.00     Years: 40.00     Pack years: 40.00     Types: Cigars     Last attempt to quit: 2016     Years since quitting: 3.9   • Smokeless tobacco: Former User     Types: Chew     Quit date: 2016   • Tobacco comment: Occasional cigar   Substance Use Topics   • Alcohol  use: Yes     Frequency: 2-4 times a month     Drinks per session: 5 or 6     Binge frequency: Weekly     Comment: 6-7/week   • Drug use: Yes     Types: Marijuana     Comment: weekly      Social History     Substance and Sexual Activity   Drug Use Yes   • Types: Marijuana    Comment: weekly       FAMILY HISTORY  Family History   Problem Relation Age of Onset   • Cancer Mother         breast cancer   • Heart Disease Father 38        heart transplant, 3 MI's, multiple bypass   • Stroke Maternal Grandmother        CURRENT MEDICATIONS  Home Medications     Reviewed by Светлана Salinas R.N. (Registered Nurse) on 12/15/19 at 1423  Med List Status: <None>   Medication Last Dose Status   acetaminophen (TYLENOL) 500 MG Tab  Active   aspirin EC 81 MG EC tablet  Active   atorvastatin (LIPITOR) 80 MG tablet  Active   calcium carbonate (TUMS) 500 MG Chew Tab  Active   Calcium Carbonate Antacid (SHAMA-SELTZER ANTACID PO)  Active   DILTIAZem CD (CARDIZEM CD) 120 MG CAPSULE SR 24 HR  Active   Diphenhydramine-APAP, sleep, (TYLENOL PM EXTRA STRENGTH PO)  Active   lisinopril (PRINIVIL) 10 MG Tab  Active   Phenylephrine HCl (AFRIN ALLERGY NA)  Active   sildenafil citrate (VIAGRA) 100 MG tablet  Active                ALLERGIES  Allergies   Allergen Reactions   • Brilinta [Ticagrelor] Shortness of Breath     Med put patient in ER twice       PHYSICAL EXAM  VITAL SIGNS: /90   Pulse 90   Temp 36.1 °C (96.9 °F) (Temporal)   Resp 18   Wt 106.6 kg (235 lb)   SpO2 96%   BMI 32.78 kg/m²     General: WDWN, nontoxic appearing in NAD; A+Ox3; V/S as above.  Skin: warm and dry; good color; no rash   HEENT: NCAT; EOMs intact; PERRL; no scleral icterus   Neck: FROM; no meningismus, no LAD   Cardiovascular: Regular heart rate and rhythm. No murmurs, rubs, or gallops; pulses 2+ bilaterally radially and DP areas  Lungs: Clear to auscultation with good air movement bilaterally. No wheezes, rhonchi, or rales.   Abdomen: BS present; soft; NTND;  no rebound, guarding, or rigidity. No organomegaly or pulsatile mass; no CVAT   Extremities: PHILLIPS x 4; no e/o trauma; no pedal edema. Mild edema over the knee. No divot in distal thigh region. No bony point tenderness or gross effusion. Able to extend lower leg against gravity. DP 2+.  Neurologic: CNs III-XII grossly intact; speech clear; distal sensation intact; strength 5/5 UE/LEs.   Psychiatric: Appropriate affect, normal mood                                                           DIAGNOSTIC STUDIES / PROCEDURES    RADIOLOGY  DX-KNEE COMPLETE 4+ LEFT   Final Result      1.  No left knee fracture or dislocation.      2.  Small joint effusion. No lipohemarthrosis.      3.  Minimal degenerative change of the upper pole of the patella.        The radiologist's interpretation of all radiological studies have been reviewed by me.    COURSE & MEDICAL DECISION MAKING  Pertinent Labs & Imaging studies reviewed. (See chart for details)    Yves Ivory is a 59 y.o. male who presents complaining of left knee hyperextension injury today.  Patient is neurovascularly intact.  No dislocation noted.  No concern for quadriceps tendon rupture given no divot palpable and patient is able to extend his lower leg against gravity.    Take Motrin as needed for pain and inflammation. Take Norco as needed for pain that is not controlled by Motrin. Apply ice, wear immobilizer, and rest. Follow-up with your primary care provider and orthopedics for recheck and possible MRI imaging. Return to ER for worsening symptoms.     2:39 PM - Patient seen and examined at bedside. I discussed that we will order an x-ray to further evaluate the patients symptoms. Patient was treated with Norco 5-325 mg. Ordered left knee x-ray to evaluate his symptoms.    3:57 PM - Patient was reevaluated at bedside. Discussed radiology results with the patient and informed them that there is no sign of fractures. Patients symptoms are consistent with a  musculoskeletal pain. Advised the patient to wear a knee immobilizer and use crutches. I prescribed him Norco for treatment of severe pain. Additionally I recommended he apply ice. If he does not see improvement in the next 3-5 days he should follow with his PCP or an orthopedist for an MRI. Patient will be discharged at this time. He verbalizes agreement with discharge and plan of care.     I reviewed prescription monitoring program for patient's narcotic use before prescribing a scheduled drug.The patient will not drink alcohol nor drive with prescribed medications. The patient will return for new or worsening symptoms and is stable at the time of discharge.    The patient is referred to a primary physician for blood pressure management, diabetic screening, and for all other preventative health concerns.    DISPOSITION:  Patient will be discharged home in stable condition.    FOLLOW UP:  [facility], Emergency Dept  [address] [facility] [address] [phone]    As needed, If symptoms worsen    Enrico De La Torre M.D.  [address] [facility] [id number] [phone]    Schedule an appointment as soon as possible for a visit in 2 days      Prabhu Bermudez M.D.  [address] [address]  [phone]    Schedule an appointment as soon as possible for a visit in 1 day        OUTPATIENT MEDICATIONS:  New Prescriptions    HYDROCODONE-ACETAMINOPHEN (NORCO) 5-325 MG TAB PER TABLET    Take 1 Tab by mouth every 6 hours as needed (pain) for up to 3 days.        FINAL IMPRESSION  1. Fall, initial encounter    2. Acute pain of left knee          Farnaz ECHAVARRIA (Benjamín), am scribing for, and in the presence of, Kely Gavin M.D..    Electronically signed by: Farnaz Mcnamara (Benjamín), 12/15/2019    Kely ECHAVARRIA M.D. personally performed the services described in this documentation, as scribed by Farnaz Mcnamara in my presence, and it is both accurate and complete. E    The note  accurately reflects work and decisions made by me.  Kely Gavin  12/18/2019  11:16 AM

## 2019-12-15 NOTE — ED TRIAGE NOTES
Pt BIB with c/c of left ankle pain. Pt stating he was coming down off a ladder when he fell and hurt his left knee. Reporting the knee popped and he hit his head on the drywall. Denies LOC.

## 2019-12-16 NOTE — ED NOTES
Received orders for DC. VSS. Educated regarding narcotic pain medication and informed consent obtained. Aware of all f/u appointments and demonstrates proper use of crutches.

## 2020-01-10 ENCOUNTER — OFFICE VISIT (OUTPATIENT)
Dept: MEDICAL GROUP | Facility: PHYSICIAN GROUP | Age: 60
End: 2020-01-10
Payer: COMMERCIAL

## 2020-01-10 ENCOUNTER — TELEPHONE (OUTPATIENT)
Dept: MEDICAL GROUP | Facility: PHYSICIAN GROUP | Age: 60
End: 2020-01-10

## 2020-01-10 VITALS
BODY MASS INDEX: 32.2 KG/M2 | TEMPERATURE: 98.4 F | HEART RATE: 97 BPM | HEIGHT: 71 IN | WEIGHT: 230 LBS | OXYGEN SATURATION: 94 % | DIASTOLIC BLOOD PRESSURE: 88 MMHG | SYSTOLIC BLOOD PRESSURE: 126 MMHG

## 2020-01-10 DIAGNOSIS — E66.9 OBESITY (BMI 30-39.9): ICD-10-CM

## 2020-01-10 DIAGNOSIS — E78.1 HYPERTRIGLYCERIDEMIA: ICD-10-CM

## 2020-01-10 DIAGNOSIS — E78.2 MIXED HYPERLIPIDEMIA: ICD-10-CM

## 2020-01-10 DIAGNOSIS — Z11.59 NEED FOR HEPATITIS C SCREENING TEST: ICD-10-CM

## 2020-01-10 DIAGNOSIS — R73.03 PREDIABETES: ICD-10-CM

## 2020-01-10 DIAGNOSIS — K80.50 BILIARY COLIC: ICD-10-CM

## 2020-01-10 DIAGNOSIS — Z23 NEED FOR VACCINATION: ICD-10-CM

## 2020-01-10 PROCEDURE — 90471 IMMUNIZATION ADMIN: CPT | Performed by: FAMILY MEDICINE

## 2020-01-10 PROCEDURE — 90750 HZV VACC RECOMBINANT IM: CPT | Performed by: FAMILY MEDICINE

## 2020-01-10 PROCEDURE — 99214 OFFICE O/P EST MOD 30 MIN: CPT | Mod: 25 | Performed by: FAMILY MEDICINE

## 2020-01-10 RX ORDER — ICOSAPENT ETHYL 1000 MG/1
2 CAPSULE ORAL
Qty: 120 CAP | Refills: 2 | Status: SHIPPED | OUTPATIENT
Start: 2020-01-10 | End: 2020-03-27

## 2020-01-10 ASSESSMENT — PAIN SCALES - GENERAL: PAINLEVEL: NO PAIN

## 2020-01-10 NOTE — TELEPHONE ENCOUNTER
DOCUMENTATION OF PAR STATUS:    1. Name of Medication & Dose: Icosapent Ethyl (VASCEPA) 1 g Cap     2. Name of Prescription Coverage Company & phone #: [employer]     3. Date Prior Auth Submitted: 01/10/2020    4. What information was given to obtain insurance decision? Dx Code Tried and failed     5. Prior Auth Status? Pending    6. Patient Notified: N\A    Yves Ivory (Key: [id number])     [employer] is reviewing your PA request. Typically an electronic response will be received within 72 hours. To check for an update later, open this request from your dashboard.  You may close this dialog and return to your dashboard to perform other tasks.

## 2020-01-11 NOTE — ASSESSMENT & PLAN NOTE
Chronic ongoing medical condition.  Patient with a history of elevated fasting glucose and A1c.  Labs ordered for follow-up of blood sugars.  Patient has been losing weight which should help with his blood sugar levels

## 2020-01-11 NOTE — ASSESSMENT & PLAN NOTE
Chronic ongoing medical condition.  Patient with history of hyper lipidemia with hypertriglyceridemia currently taking atorvastatin and following with cardiology.  Patient concerned about elevated triglyceride levels and would like to start with Vascepa due to trials demonstrating mortality benefit.

## 2020-01-11 NOTE — ASSESSMENT & PLAN NOTE
Chronic improving medical condition.  BMI 32.08 today.  Patient currently down 18 pounds since last visit 3 months ago.  Patient working on eating a healthier diet and exercising.

## 2020-01-11 NOTE — ASSESSMENT & PLAN NOTE
New problem to examiner.  Patient with a history of intermittent sharp right upper quadrant abdominal pain that seems to be worse shortly after eating.  Onset insidiously years ago with rare short-lived sharp episodes of abdominal pain.  Patient has noticed that over the holidays he had numerous worsening episodes of sharp right upper quadrant abdominal pain that was radiating to his back and experienced this up to 4 times in 1 day where he was doubled over in severe pain and sweating but would resolve after 15 minutes.  Patient refused to go to the emergency department at that time but has not had any symptoms since 12/27/2019.  Denies any nausea, vomiting, diarrhea, constipation.  Currently asymptomatic.

## 2020-01-11 NOTE — PROGRESS NOTES
"CC:Diagnoses of Biliary colic, Obesity (BMI 30-39.9), Prediabetes, Mixed hyperlipidemia, Hypertriglyceridemia, Need for hepatitis C screening test, and Need for vaccination were pertinent to this visit.      HISTORY OF PRESENT ILLNESS: Patient is a 59 y.o. male established patient who presents today to discuss numerous medical issues and follow-up on recent hospitalization for knee injury after falling off a ladder while painting.  Patient was seen in the emergency room on 12/15/2019 for falling off a ladder and injuring his knee.  His evaluation in the emergency department was unremarkable with a relatively normal knee x-ray however he did have significant pain and was discharged from the emergency department with Norco.  Patient did follow-up with orthopedics on 12/16/2019 and had his knee drained with what he says was \"a cup and a half of bloody fluid\" and was given a steroid knee injection at that time that led to sustained pain relief.  Patient still has some stiffness and effusion in his knee but states that his pain is relatively tolerable.    Health Maintenance: Shingles vaccine given today.    HLD (hyperlipidemia)  Chronic ongoing medical condition.  Patient with history of hyper lipidemia with hypertriglyceridemia currently taking atorvastatin and following with cardiology.  Patient concerned about elevated triglyceride levels and would like to start with Vascepa due to trials demonstrating mortality benefit.    Prediabetes  Chronic ongoing medical condition.  Patient with a history of elevated fasting glucose and A1c.  Labs ordered for follow-up of blood sugars.  Patient has been losing weight which should help with his blood sugar levels    Obesity (BMI 30-39.9)  Chronic improving medical condition.  BMI 32.08 today.  Patient currently down 18 pounds since last visit 3 months ago.  Patient working on eating a healthier diet and exercising.    Biliary colic  New problem to examiner.  Patient with a " history of intermittent sharp right upper quadrant abdominal pain that seems to be worse shortly after eating.  Onset insidiously years ago with rare short-lived sharp episodes of abdominal pain.  Patient has noticed that over the holidays he had numerous worsening episodes of sharp right upper quadrant abdominal pain that was radiating to his back and experienced this up to 4 times in 1 day where he was doubled over in severe pain and sweating but would resolve after 15 minutes.  Patient refused to go to the emergency department at that time but has not had any symptoms since 12/27/2019.  Denies any nausea, vomiting, diarrhea, constipation.  Currently asymptomatic.      Patient Active Problem List    Diagnosis Date Noted   • Obesity (BMI 30-39.9) 12/12/2016     Priority: Medium   • Coronary artery disease involving native coronary artery of native heart without angina pectoris 07/26/2016     Priority: Medium   • HLD (hyperlipidemia) 07/26/2016     Priority: Medium   • VT (ventricular tachycardia) ([facility]) 07/26/2016     Priority: Medium   • STEMI (ST elevation myocardial infarction) ([facility]) 07/07/2016     Priority: Medium   • Abdominal gas pain 12/12/2016     Priority: Low   • Preventative health care 12/12/2016     Priority: Low   • Cough 12/12/2016     Priority: Low   • Cigar smoker 08/22/2016     Priority: Low   • Anxiety 08/22/2016     Priority: Low   • Biliary colic 01/10/2020   • ROSALINO (obstructive sleep apnea) 04/25/2019   • Former smoker 04/25/2019   • Prediabetes 04/22/2018      Allergies:Brilinta [ticagrelor]    Current Outpatient Medications   Medication Sig Dispense Refill   • Icosapent Ethyl (VASCEPA) 1 g Cap Take 2 Tabs by mouth 2 Times a Day. 120 Cap 2   • lisinopril (PRINIVIL) 10 MG Tab Take 1 Tab by mouth every day. 90 Tab 0   • atorvastatin (LIPITOR) 80 MG tablet Take 0.5 Tabs by mouth every evening. 45 Tab 0   • Calcium Carbonate Antacid (SHAMA-SELTZER ANTACID PO) Take  by mouth.     • sildenafil citrate  "(VIAGRA) 100 MG tablet Take 0.5 Tabs by mouth as needed for Erectile Dysfunction. 10 Tab 1   • DILTIAZem CD (CARDIZEM CD) 120 MG CAPSULE SR 24 HR Take 1 Cap by mouth every day. 90 Cap 3   • Phenylephrine HCl (AFRIN ALLERGY NA) Spray  in nose.     • calcium carbonate (TUMS) 500 MG Chew Tab Take 2,000 mg by mouth every day.     • aspirin EC 81 MG EC tablet Take 1 Tab by mouth every day. 30 Tab 0     No current facility-administered medications for this visit.        Social History     Tobacco Use   • Smoking status: Former Smoker     Packs/day: 1.00     Years: 40.00     Pack years: 40.00     Types: Cigars     Last attempt to quit: 2016     Years since quittin.0   • Smokeless tobacco: Former User     Types: Chew     Quit date:    • Tobacco comment: Occasional cigar   Substance Use Topics   • Alcohol use: Yes     Frequency: 2-4 times a month     Drinks per session: 5 or 6     Binge frequency: Weekly     Comment: 6-7/week   • Drug use: Yes     Types: Marijuana     Comment: weekly     Social History     Patient does not qualify to have social determinant information on file (likely too young).   Social History Narrative   • Not on file       Family History   Problem Relation Age of Onset   • Cancer Mother         breast cancer   • Heart Disease Father 38        heart transplant, 3 MI's, multiple bypass   • Stroke Maternal Grandmother        Review of Systems:    Constitutional: No Fevers, Chills  Resp: No Shortness of breath  CV: No Chest pain  GI: No Nausea/Vomiting  MSK: No weakness      Exam:    /88 (BP Location: Left arm, Patient Position: Sitting, BP Cuff Size: Adult)   Pulse 97   Temp 36.9 °C (98.4 °F)   Ht 1.803 m (5' 11\")   Wt 104.3 kg (230 lb)   SpO2 94%  Body mass index is 32.08 kg/m².    General:  Well nourished, well developed male in NAD  HENT: Atraumatic, normocephalic  EYES: Extraocular movements intact, pupils equal and reactive to light  NECK: Supple, FROM  CHEST: No deformities, Equal " chest expansion  RESP: Unlabored, no stridor or audible wheeze  ABD: Soft, Nontender, Non-Distended  Extremities: No Clubbing, Cyanosis, or Edema  Skin: Warm/dry, without rashes  Neuro: A/O x 4, CN 2-12 Grossly intact, Motor/sensory grossly intact  Psych: Normal behavior, normal affect      LABS: 5/28/2019: Results reviewed and discussed with the patient, questions answered.    Knee x-ray from 12/15/2019 reviewed and discussed with patient    ED MD notes reviewed and summarized as above.      Assessment/Plan:  1. Biliary colic  New problem to examiner.  Ultrasound as below.  Follow-up pending ultrasound.  - US-RUQ; Future    2. Obesity (BMI 30-39.9)  Chronic improving medical condition.  Counseled on continued diet, exercise, and lifestyle changes to help with continued weight loss.    3. Prediabetes  Chronic ongoing medical condition.  Labs as below.  Follow-up in 3 months.  - HEMOGLOBIN A1C; Future    4. Mixed hyperlipidemia  5. Hypertriglyceridemia  Chronic ongoing medical condition.  Continue statin and consider trial of Vascepa.  Labs as below.  Follow-up in 6 months  - CBC WITH DIFFERENTIAL; Future  - Comp Metabolic Panel; Future  - Lipid Profile; Future  - Icosapent Ethyl (VASCEPA) 1 g Cap; Take 2 Tabs by mouth 2 Times a Day.  Dispense: 120 Cap; Refill: 2    6. Need for hepatitis C screening test  - HEP C VIRUS ANTIBODY; Future    7. Need for vaccination  - Shingles Vaccine (Shingrix)    8.  Knee pain  New problem to examiner.  Follow-up with orthopedics as needed.    Follow-up in 3 to 6 months    Please note that this dictation was created using voice recognition software. I have worked with consultants from the vendor as well as technical experts from [employer] to optimize the interface. I have made every reasonable attempt to correct obvious errors, but I expect that there are errors of grammar and possibly content that I did not discover before finalizing the note.

## 2020-01-13 NOTE — TELEPHONE ENCOUNTER
FINAL PRIOR AUTHORIZATION STATUS:    1.  Name of Medication & Dose: Icosapent Ethyl (VASCEPA) 1 g Cap     2. Prior Auth Status: Denied.  Reason: did not meet criteria. See Media    3. Action Taken: Pharmacy Notified: N\A Patient Notified: N\A

## 2020-01-22 ENCOUNTER — HOSPITAL ENCOUNTER (OUTPATIENT)
Dept: RADIOLOGY | Facility: MEDICAL CENTER | Age: 60
End: 2020-01-22
Attending: FAMILY MEDICINE
Payer: COMMERCIAL

## 2020-01-22 DIAGNOSIS — K80.50 BILIARY COLIC: ICD-10-CM

## 2020-01-22 PROCEDURE — 76705 ECHO EXAM OF ABDOMEN: CPT

## 2020-03-09 DIAGNOSIS — E78.49 OTHER HYPERLIPIDEMIA: ICD-10-CM

## 2020-03-09 DIAGNOSIS — I25.10 CORONARY ARTERY DISEASE INVOLVING NATIVE CORONARY ARTERY OF NATIVE HEART WITHOUT ANGINA PECTORIS: ICD-10-CM

## 2020-03-11 RX ORDER — DILTIAZEM HYDROCHLORIDE 120 MG/1
CAPSULE, COATED, EXTENDED RELEASE ORAL
Qty: 90 CAP | Refills: 0 | Status: SHIPPED | OUTPATIENT
Start: 2020-03-11 | End: 2020-06-22 | Stop reason: SDUPTHER

## 2020-03-11 RX ORDER — ATORVASTATIN CALCIUM 80 MG/1
TABLET, FILM COATED ORAL
Qty: 45 TAB | Refills: 0 | Status: SHIPPED | OUTPATIENT
Start: 2020-03-11 | End: 2020-06-22 | Stop reason: SDUPTHER

## 2020-03-13 DIAGNOSIS — I25.10 CORONARY ARTERY DISEASE INVOLVING NATIVE CORONARY ARTERY OF NATIVE HEART WITHOUT ANGINA PECTORIS: ICD-10-CM

## 2020-03-13 RX ORDER — LISINOPRIL 10 MG/1
TABLET ORAL
Qty: 90 TAB | Refills: 0 | Status: SHIPPED | OUTPATIENT
Start: 2020-03-13 | End: 2020-06-22 | Stop reason: SDUPTHER

## 2020-03-17 ENCOUNTER — HOSPITAL ENCOUNTER (OUTPATIENT)
Dept: LAB | Facility: MEDICAL CENTER | Age: 60
End: 2020-03-17
Attending: FAMILY MEDICINE
Payer: COMMERCIAL

## 2020-03-17 DIAGNOSIS — R73.03 PREDIABETES: ICD-10-CM

## 2020-03-17 DIAGNOSIS — Z11.59 NEED FOR HEPATITIS C SCREENING TEST: ICD-10-CM

## 2020-03-17 DIAGNOSIS — E78.2 MIXED HYPERLIPIDEMIA: ICD-10-CM

## 2020-03-17 LAB
ALBUMIN SERPL BCP-MCNC: 4.6 G/DL (ref 3.2–4.9)
ALBUMIN/GLOB SERPL: 2.1 G/DL
ALP SERPL-CCNC: 62 U/L (ref 30–99)
ALT SERPL-CCNC: 46 U/L (ref 2–50)
ANION GAP SERPL CALC-SCNC: 10 MMOL/L (ref 7–16)
AST SERPL-CCNC: 22 U/L (ref 12–45)
BASOPHILS # BLD AUTO: 0.9 % (ref 0–1.8)
BASOPHILS # BLD: 0.05 K/UL (ref 0–0.12)
BILIRUB SERPL-MCNC: 0.7 MG/DL (ref 0.1–1.5)
BUN SERPL-MCNC: 17 MG/DL (ref 8–22)
CALCIUM SERPL-MCNC: 9.9 MG/DL (ref 8.5–10.5)
CHLORIDE SERPL-SCNC: 104 MMOL/L (ref 96–112)
CHOLEST SERPL-MCNC: 152 MG/DL (ref 100–199)
CO2 SERPL-SCNC: 25 MMOL/L (ref 20–33)
CREAT SERPL-MCNC: 0.85 MG/DL (ref 0.5–1.4)
EOSINOPHIL # BLD AUTO: 0.16 K/UL (ref 0–0.51)
EOSINOPHIL NFR BLD: 2.9 % (ref 0–6.9)
ERYTHROCYTE [DISTWIDTH] IN BLOOD BY AUTOMATED COUNT: 41.2 FL (ref 35.9–50)
EST. AVERAGE GLUCOSE BLD GHB EST-MCNC: 137 MG/DL
GLOBULIN SER CALC-MCNC: 2.2 G/DL (ref 1.9–3.5)
GLUCOSE SERPL-MCNC: 147 MG/DL (ref 65–99)
HBA1C MFR BLD: 6.4 % (ref 0–5.6)
HCT VFR BLD AUTO: 49.2 % (ref 42–52)
HCV AB SER QL: NORMAL
HDLC SERPL-MCNC: 31 MG/DL
HGB BLD-MCNC: 16.2 G/DL (ref 14–18)
IMM GRANULOCYTES # BLD AUTO: 0.01 K/UL (ref 0–0.11)
IMM GRANULOCYTES NFR BLD AUTO: 0.2 % (ref 0–0.9)
LDLC SERPL CALC-MCNC: 89 MG/DL
LYMPHOCYTES # BLD AUTO: 1.75 K/UL (ref 1–4.8)
LYMPHOCYTES NFR BLD: 31.9 % (ref 22–41)
MCH RBC QN AUTO: 30.1 PG (ref 27–33)
MCHC RBC AUTO-ENTMCNC: 32.9 G/DL (ref 33.7–35.3)
MCV RBC AUTO: 91.4 FL (ref 81.4–97.8)
MONOCYTES # BLD AUTO: 0.47 K/UL (ref 0–0.85)
MONOCYTES NFR BLD AUTO: 8.6 % (ref 0–13.4)
NEUTROPHILS # BLD AUTO: 3.05 K/UL (ref 1.82–7.42)
NEUTROPHILS NFR BLD: 55.5 % (ref 44–72)
NRBC # BLD AUTO: 0 K/UL
NRBC BLD-RTO: 0 /100 WBC
PLATELET # BLD AUTO: 253 K/UL (ref 164–446)
PMV BLD AUTO: 10.3 FL (ref 9–12.9)
POTASSIUM SERPL-SCNC: 4.3 MMOL/L (ref 3.6–5.5)
PROT SERPL-MCNC: 6.8 G/DL (ref 6–8.2)
RBC # BLD AUTO: 5.38 M/UL (ref 4.7–6.1)
SODIUM SERPL-SCNC: 139 MMOL/L (ref 135–145)
TRIGL SERPL-MCNC: 158 MG/DL (ref 0–149)
WBC # BLD AUTO: 5.5 K/UL (ref 4.8–10.8)

## 2020-03-17 PROCEDURE — 36415 COLL VENOUS BLD VENIPUNCTURE: CPT

## 2020-03-17 PROCEDURE — 80053 COMPREHEN METABOLIC PANEL: CPT

## 2020-03-17 PROCEDURE — 80061 LIPID PANEL: CPT

## 2020-03-17 PROCEDURE — 83036 HEMOGLOBIN GLYCOSYLATED A1C: CPT

## 2020-03-17 PROCEDURE — 86803 HEPATITIS C AB TEST: CPT

## 2020-03-17 PROCEDURE — 85025 COMPLETE CBC W/AUTO DIFF WBC: CPT

## 2020-03-27 ENCOUNTER — OFFICE VISIT (OUTPATIENT)
Dept: MEDICAL GROUP | Facility: PHYSICIAN GROUP | Age: 60
End: 2020-03-27
Payer: COMMERCIAL

## 2020-03-27 VITALS
BODY MASS INDEX: 34.16 KG/M2 | SYSTOLIC BLOOD PRESSURE: 160 MMHG | TEMPERATURE: 97 F | WEIGHT: 244 LBS | DIASTOLIC BLOOD PRESSURE: 92 MMHG | HEART RATE: 91 BPM | HEIGHT: 71 IN | OXYGEN SATURATION: 96 %

## 2020-03-27 DIAGNOSIS — G89.29 CHRONIC PAIN OF LEFT KNEE: ICD-10-CM

## 2020-03-27 DIAGNOSIS — M25.562 CHRONIC PAIN OF LEFT KNEE: ICD-10-CM

## 2020-03-27 PROCEDURE — 20610 DRAIN/INJ JOINT/BURSA W/O US: CPT | Performed by: FAMILY MEDICINE

## 2020-03-27 RX ORDER — TRIAMCINOLONE ACETONIDE 40 MG/ML
40 INJECTION, SUSPENSION INTRA-ARTICULAR; INTRAMUSCULAR ONCE
Status: COMPLETED | OUTPATIENT
Start: 2020-03-27 | End: 2020-03-27

## 2020-03-27 RX ADMIN — TRIAMCINOLONE ACETONIDE 40 MG: 40 INJECTION, SUSPENSION INTRA-ARTICULAR; INTRAMUSCULAR at 17:31

## 2020-03-27 ASSESSMENT — FIBROSIS 4 INDEX: FIB4 SCORE: 0.73

## 2020-03-27 ASSESSMENT — PATIENT HEALTH QUESTIONNAIRE - PHQ9: CLINICAL INTERPRETATION OF PHQ2 SCORE: 0

## 2020-05-14 ENCOUNTER — APPOINTMENT (OUTPATIENT)
Dept: SLEEP MEDICINE | Facility: MEDICAL CENTER | Age: 60
End: 2020-05-14
Payer: COMMERCIAL

## 2020-07-14 ENCOUNTER — OFFICE VISIT (OUTPATIENT)
Dept: MEDICAL GROUP | Facility: PHYSICIAN GROUP | Age: 60
End: 2020-07-14
Payer: COMMERCIAL

## 2020-07-14 VITALS
SYSTOLIC BLOOD PRESSURE: 138 MMHG | HEART RATE: 76 BPM | TEMPERATURE: 96.6 F | OXYGEN SATURATION: 95 % | HEIGHT: 71 IN | DIASTOLIC BLOOD PRESSURE: 90 MMHG | WEIGHT: 244 LBS | BODY MASS INDEX: 34.16 KG/M2

## 2020-07-14 DIAGNOSIS — Z23 NEED FOR VACCINATION: ICD-10-CM

## 2020-07-14 DIAGNOSIS — E66.9 OBESITY (BMI 30-39.9): ICD-10-CM

## 2020-07-14 DIAGNOSIS — N52.9 VASCULOGENIC ERECTILE DYSFUNCTION, UNSPECIFIED VASCULOGENIC ERECTILE DYSFUNCTION TYPE: ICD-10-CM

## 2020-07-14 DIAGNOSIS — I10 ESSENTIAL HYPERTENSION: ICD-10-CM

## 2020-07-14 DIAGNOSIS — N52.9 ERECTILE DYSFUNCTION, UNSPECIFIED ERECTILE DYSFUNCTION TYPE: ICD-10-CM

## 2020-07-14 DIAGNOSIS — G47.33 OSA (OBSTRUCTIVE SLEEP APNEA): ICD-10-CM

## 2020-07-14 PROCEDURE — 99214 OFFICE O/P EST MOD 30 MIN: CPT | Performed by: FAMILY MEDICINE

## 2020-07-14 RX ORDER — IPRATROPIUM BROMIDE 42 UG/1
2 SPRAY, METERED NASAL 4 TIMES DAILY
Qty: 1 BOTTLE | Refills: 4 | Status: SHIPPED | OUTPATIENT
Start: 2020-07-14 | End: 2021-09-10

## 2020-07-14 RX ORDER — SILDENAFIL 100 MG/1
50 TABLET, FILM COATED ORAL PRN
Qty: 10 TAB | Refills: 1 | Status: SHIPPED | OUTPATIENT
Start: 2020-07-14 | End: 2021-09-10

## 2020-07-14 ASSESSMENT — FIBROSIS 4 INDEX: FIB4 SCORE: 0.73

## 2020-07-15 NOTE — ASSESSMENT & PLAN NOTE
Chronic ongoing medical condition.  Currently well controlled with BiPAP use currently following with sleep medicine, Dr. Renteria.  Patient also using phenylephrine nightly which may be exacerbating his high blood pressure.  Prescription for Atrovent nasal spray and recommendation to discontinue phenylephrine nasal spray for blood pressure concerns.

## 2020-07-15 NOTE — ASSESSMENT & PLAN NOTE
Chronic ongoing medical condition.  Patient currently using sildenafil 50 mg nightly as needed for sexual intercourse.  Patient has a history of STEMI and stent placement as well as CAD.  Likely contributing to ongoing erectile dysfunction concerns however that is well controlled with his current intermittent Viagra use.  Patient is able to achieve a normal erection and climax normally.  Patient does not report any early detumescence.  No pain with erection.

## 2020-07-15 NOTE — PROGRESS NOTES
CC:Diagnoses of Need for vaccination, Erectile dysfunction, unspecified erectile dysfunction type, Obesity (BMI 30-39.9), Vasculogenic erectile dysfunction, unspecified vasculogenic erectile dysfunction type, ROSALINO (obstructive sleep apnea), and Essential hypertension were pertinent to this visit.      HISTORY OF PRESENT ILLNESS: Patient is a 59 y.o. male established patient who presents today to follow-up on multiple medical conditions.    Health Maintenance: Completed    Obesity (BMI 30-39.9)  Chronic worsening medical condition.  Patient states that COVID-19 pandemic has affected his weight due to shutdown's and shelter in place orders.  Patient not as active as he was in snacking much more however he is working on a low-carb diet currently.    Vasculogenic erectile dysfunction  Chronic ongoing medical condition.  Patient currently using sildenafil 50 mg nightly as needed for sexual intercourse.  Patient has a history of STEMI and stent placement as well as CAD.  Likely contributing to ongoing erectile dysfunction concerns however that is well controlled with his current intermittent Viagra use.  Patient is able to achieve a normal erection and climax normally.  Patient does not report any early detumescence.  No pain with erection.    ROSALINO (obstructive sleep apnea)  Chronic ongoing medical condition.  Currently well controlled with BiPAP use currently following with sleep medicine, Dr. Renteria.  Patient also using phenylephrine nightly which may be exacerbating his high blood pressure.  Prescription for Atrovent nasal spray and recommendation to discontinue phenylephrine nasal spray for blood pressure concerns.    Essential hypertension  Chronic ongoing medical condition.  Currently taking lisinopril 10 mg daily.  Borderline elevated blood pressures, 138/90 today.  Possibly exacerbated by ongoing phenylephrine use nightly for nasal hygiene for BiPAP use.  Prescription for Atrovent nasal spray sent today.      Patient  Active Problem List    Diagnosis Date Noted   • Obesity (BMI 30-39.9) 12/12/2016     Priority: Medium   • Coronary artery disease involving native coronary artery of native heart without angina pectoris 07/26/2016     Priority: Medium   • HLD (hyperlipidemia) 07/26/2016     Priority: Medium   • VT (ventricular tachycardia) ([facility]) 07/26/2016     Priority: Medium   • STEMI (ST elevation myocardial infarction) ([facility]) 07/07/2016     Priority: Medium   • Abdominal gas pain 12/12/2016     Priority: Low   • Preventative health care 12/12/2016     Priority: Low   • Cough 12/12/2016     Priority: Low   • Cigar smoker 08/22/2016     Priority: Low   • Anxiety 08/22/2016     Priority: Low   • Vasculogenic erectile dysfunction 07/14/2020   • Essential hypertension 07/14/2020   • Biliary colic 01/10/2020   • ROSALINO (obstructive sleep apnea) 04/25/2019   • Former smoker 04/25/2019   • Prediabetes 04/22/2018      Allergies:Brilinta [ticagrelor]    Current Outpatient Medications   Medication Sig Dispense Refill   • Zoster Vac Recomb Adjuvanted (SHINGRIX) 50 MCG/0.5ML Recon Susp 0.5 mL by Intramuscular route Once for 1 dose. 0.5 mL 0   • sildenafil citrate (VIAGRA) 100 MG tablet Take 0.5 Tabs by mouth as needed for Erectile Dysfunction. 10 Tab 1   • ipratropium (ATROVENT) 0.06 % Solution Spray 2 Sprays in nose 4 times a day. 1 Bottle 4   • atorvastatin (LIPITOR) 80 MG tablet Take 0.5 Tabs by mouth every evening. 45 Tab 1   • DILTIAZem CD (CARDIZEM CD) 120 MG CAPSULE SR 24 HR Take 1 Cap by mouth every day. 90 Cap 1   • lisinopril (PRINIVIL) 10 MG Tab Take 1 Tab by mouth every day. 90 Tab 1   • Calcium Carbonate Antacid (SHAMA-SELTZER ANTACID PO) Take  by mouth.     • Phenylephrine HCl (AFRIN ALLERGY NA) Waterproof  in nose.     • aspirin EC 81 MG EC tablet Take 1 Tab by mouth every day. 30 Tab 0     No current facility-administered medications for this visit.        Social History     Tobacco Use   • Smoking status: Former Smoker     Packs/day:  "1.00     Years: 40.00     Pack years: 40.00     Types: Cigars     Last attempt to quit: 2016     Years since quittin.5   • Smokeless tobacco: Former User     Types: Chew     Quit date:    • Tobacco comment: Occasional cigar   Substance Use Topics   • Alcohol use: Yes     Frequency: 2-4 times a month     Drinks per session: 5 or 6     Binge frequency: Weekly     Comment: 6-7/week   • Drug use: Yes     Types: Marijuana     Comment: weekly     Social History     Social History Narrative   • Not on file       Family History   Problem Relation Age of Onset   • Cancer Mother         breast cancer   • Heart Disease Father 38        heart transplant, 3 MI's, multiple bypass   • Stroke Maternal Grandmother        Review of Systems:    Constitutional: No Fevers, Chills  Eyes: No vision changes  ENT: No hearing changes  Resp: No Shortness of breath  CV: No Chest pain  GI: No Nausea/Vomiting  MSK: No weakness  Skin: No rashes  Neuro: No Headaches  Psych: No Suicidal ideations    All remaining systems reviewed and found to be negative, except as stated above.    Exam:    /90 (BP Location: Right arm, Patient Position: Sitting, BP Cuff Size: Adult)   Pulse 76   Temp 35.9 °C (96.6 °F) (Temporal)   Ht 1.803 m (5' 11\")   Wt 110.7 kg (244 lb)   SpO2 95%  Body mass index is 34.03 kg/m².    General:  Well nourished, well developed male in NAD, obese  HENT: Atraumatic, normocephalic  EYES: Extraocular movements intact, pupils equal and reactive to light  NECK: Supple, FROM  CHEST: No deformities, Equal chest expansion  RESP: Unlabored, no stridor or audible wheeze  ABD: Soft, Nontender, Non-Distended  Extremities: No Clubbing, Cyanosis, or Edema  Skin: Warm/dry, without rashes  Neuro: A/O x 4, CN 2-12 Grossly intact, Motor/sensory grossly intact  Psych: Normal behavior, normal affect      LABS: 3/17/2020: Results reviewed and discussed with the patient, questions answered.      Assessment/Plan:  1. Need for " vaccination  - Zoster Vac Recomb Adjuvanted (SHINGRIX) 50 MCG/0.5ML Recon Susp; 0.5 mL by Intramuscular route Once for 1 dose.  Dispense: 0.5 mL; Refill: 0    2. Erectile dysfunction, unspecified erectile dysfunction type  Chronic stable medical condition.  Refill of Viagra as below.  - sildenafil citrate (VIAGRA) 100 MG tablet; Take 0.5 Tabs by mouth as needed for Erectile Dysfunction.  Dispense: 10 Tab; Refill: 1    3. Obesity (BMI 30-39.9)  Chronic worsening medical condition.  Counseled on diet, exercise, and lifestyle changes to help with weight loss.  Counseled on the benefits of exercise and cardiovascular health.  Counseled on limiting refined carbohydrates and focus on healthy proteins and healthy fats.     4. ROSALINO (obstructive sleep apnea)  Chronic stable medical condition.  Discontinue phenylephrine and start Atrovent nasal spray.  Continue BiPAP use and follow-up with sleep medicine    5. Essential hypertension  Chronic ongoing medical condition.  Continue lisinopril 10 mg daily.  Discontinue phenylephrine nasal spray.    Follow-up PRN    Please note that this dictation was created using voice recognition software. I have worked with consultants from the vendor as well as technical experts from [employer] to optimize the interface. I have made every reasonable attempt to correct obvious errors, but I expect that there are errors of grammar and possibly content that I did not discover before finalizing the note.

## 2020-07-15 NOTE — ASSESSMENT & PLAN NOTE
Chronic ongoing medical condition.  Currently taking lisinopril 10 mg daily.  Borderline elevated blood pressures, 138/90 today.  Possibly exacerbated by ongoing phenylephrine use nightly for nasal hygiene for BiPAP use.  Prescription for Atrovent nasal spray sent today.

## 2020-07-15 NOTE — ASSESSMENT & PLAN NOTE
Chronic worsening medical condition.  Patient states that COVID-19 pandemic has affected his weight due to shutdown's and shelter in place orders.  Patient not as active as he was in snacking much more however he is working on a low-carb diet currently.

## 2020-09-01 ENCOUNTER — OFFICE VISIT (OUTPATIENT)
Dept: CARDIOLOGY | Facility: MEDICAL CENTER | Age: 60
End: 2020-09-01
Payer: COMMERCIAL

## 2020-09-01 VITALS
OXYGEN SATURATION: 95 % | DIASTOLIC BLOOD PRESSURE: 88 MMHG | BODY MASS INDEX: 32.9 KG/M2 | SYSTOLIC BLOOD PRESSURE: 140 MMHG | HEART RATE: 72 BPM | HEIGHT: 71 IN | WEIGHT: 235 LBS

## 2020-09-01 DIAGNOSIS — E78.2 MIXED HYPERLIPIDEMIA: ICD-10-CM

## 2020-09-01 DIAGNOSIS — E78.49 OTHER HYPERLIPIDEMIA: ICD-10-CM

## 2020-09-01 DIAGNOSIS — I25.10 CORONARY ARTERY DISEASE INVOLVING NATIVE CORONARY ARTERY OF NATIVE HEART WITHOUT ANGINA PECTORIS: ICD-10-CM

## 2020-09-01 DIAGNOSIS — G47.33 OSA (OBSTRUCTIVE SLEEP APNEA): ICD-10-CM

## 2020-09-01 PROCEDURE — 99214 OFFICE O/P EST MOD 30 MIN: CPT | Performed by: INTERNAL MEDICINE

## 2020-09-01 RX ORDER — ATORVASTATIN CALCIUM 80 MG/1
80 TABLET, FILM COATED ORAL EVERY EVENING
Qty: 90 TAB | Refills: 3 | Status: SHIPPED | OUTPATIENT
Start: 2020-09-01 | End: 2021-09-01

## 2020-09-01 ASSESSMENT — FIBROSIS 4 INDEX: FIB4 SCORE: 0.74

## 2020-09-01 NOTE — PROGRESS NOTES
Subjective:   Yves Ivory is a 60 y.o. male who presents today for follow-up of CAD hypertension and hyperlipidemia.    Hx of anxiety, HLD, obesity, HTN, anxiety, depression, smoking hx, and now CAD s/p STEMI with DENISSE to OM, VF arrest.    Since last visit he is maintaining his weight loss, continue to exercise and feels well.  Taking his medications as directed.  Lipid profile is somewhat suboptimal.  Blood pressures in the office are somewhat suboptimal.    Past Medical History:   Diagnosis Date   • Anxiety    • Bronchitis    • CAD (coronary artery disease)     STEMI-S/P DENISSE to OM   • Chickenpox    • Coronary heart disease    • Depression    • Syrian measles    • Hyperlipidemia    • Hypertension    • Influenza    • Obesity    • Sleep apnea    • Ventricular tachycardia (HCC)     post STEMI    • Whooping cough      Past Surgical History:   Procedure Laterality Date   • ANGIOPLASTY     • APPENDECTOMY CHILD     • TONSILLECTOMY     • ZZZ CARDIAC CATH       Family History   Problem Relation Age of Onset   • Cancer Mother         breast cancer   • Heart Disease Father 38        heart transplant, 3 MI's, multiple bypass   • Stroke Maternal Grandmother      Social History     Tobacco Use   Smoking Status Former Smoker   • Packs/day: 1.00   • Years: 40.00   • Pack years: 40.00   • Types: Cigars   • Quit date:    • Years since quittin.6   Smokeless Tobacco Former User   • Types: Chew   • Quit date:    Tobacco Comment    Occasional cigar     Allergies   Allergen Reactions   • Brilinta [Ticagrelor] Shortness of Breath     Med put patient in ER twice     Outpatient Encounter Medications as of 2020   Medication Sig Dispense Refill   • atorvastatin (LIPITOR) 80 MG tablet Take 1 Tab by mouth every evening. 90 Tab 3   • sildenafil citrate (VIAGRA) 100 MG tablet Take 0.5 Tabs by mouth as needed for Erectile Dysfunction. 10 Tab 1   • ipratropium (ATROVENT) 0.06 % Solution Spray 2 Sprays in nose 4 times a day. 1  "Bottle 4   • DILTIAZem CD (CARDIZEM CD) 120 MG CAPSULE SR 24 HR Take 1 Cap by mouth every day. 90 Cap 1   • lisinopril (PRINIVIL) 10 MG Tab Take 1 Tab by mouth every day. 90 Tab 1   • Calcium Carbonate Antacid (SHAMA-SELTZER ANTACID PO) Take  by mouth.     • Phenylephrine HCl (AFRIN ALLERGY NA) Dafter  in nose.     • aspirin EC 81 MG EC tablet Take 1 Tab by mouth every day. 30 Tab 0   • [DISCONTINUED] atorvastatin (LIPITOR) 80 MG tablet Take 0.5 Tabs by mouth every evening. 45 Tab 1     No facility-administered encounter medications on file as of 9/1/2020.      Review of Systems   All other systems reviewed and are negative.       Objective:   /88 (BP Location: Left arm, Patient Position: Sitting)   Pulse 72   Ht 1.803 m (5' 11\")   Wt 106.6 kg (235 lb)   SpO2 95%   BMI 32.78 kg/m²     Physical Exam   Constitutional: He is oriented to person, place, and time. He appears well-developed and well-nourished. No distress.   HENT:   Head: Normocephalic and atraumatic.   Mouth/Throat: Oropharynx is clear and moist.   Eyes: Pupils are equal, round, and reactive to light. Conjunctivae and EOM are normal. No scleral icterus.   Neck: Normal range of motion. No JVD present. No tracheal deviation present. No thyromegaly present.   Cardiovascular: Normal rate, regular rhythm, S1 normal, normal heart sounds and intact distal pulses. PMI is not displaced. Exam reveals no gallop and no friction rub.   No murmur heard.  Pulses:       Carotid pulses are 2+ on the right side and 2+ on the left side.       Radial pulses are 2+ on the right side and 2+ on the left side.        Dorsalis pedis pulses are 2+ on the right side and 2+ on the left side.        Posterior tibial pulses are 2+ on the right side and 2+ on the left side.   Pulmonary/Chest: Effort normal and breath sounds normal. No respiratory distress. He has no wheezes. He has no rales.   Abdominal: Soft. Bowel sounds are normal. He exhibits no distension and no pulsatile " midline mass. There is no abdominal tenderness. There is no guarding.   Musculoskeletal: Normal range of motion.         General: No edema.   Neurological: He is alert and oriented to person, place, and time. No cranial nerve deficit.   Skin: Skin is warm and dry. No rash noted. He is not diaphoretic. No erythema.   Psychiatric: He has a normal mood and affect. His behavior is normal. Thought content normal. His mood appears not anxious.   Nursing note and vitals reviewed.    7/7/16 ANGIOGRAM POSTPROCEDURE DIAGNOSES:  1.  Coronary artery disease including occluded proximal obtuse marginal    branch, additional nonobstructive mid left anterior descending artery    stenosis.  2.  Successful thrombectomy/PTCA/stent placement of the proximal to mid obtuse   marginal branch with 3.0x32-mm Promus PREMIER drug-eluting stent.  3.  Angio-Seal closure.    7/8/16 ECHO CONCLUSIONS  Left ventricular ejection fraction is visually estimated to be 55%,   inferior hypokinesis.  Grade II-III diastolic dysfunction.  Mild concentric left ventricular   hypertrophy.  Dilated inferior vena cava with inspiratory collapse.  No significant valve disease or flow abnormalities.   No prior study is available for comparison.     Lab Results   Component Value Date/Time    WBC 5.5 03/17/2020 06:26 AM    RBC 5.38 03/17/2020 06:26 AM    HEMOGLOBIN 16.2 03/17/2020 06:26 AM    HEMATOCRIT 49.2 03/17/2020 06:26 AM    MCV 91.4 03/17/2020 06:26 AM    MCH 30.1 03/17/2020 06:26 AM    MCHC 32.9 (L) 03/17/2020 06:26 AM    MPV 10.3 03/17/2020 06:26 AM      Lab Results   Component Value Date/Time    CHOLSTRLTOT 152 03/17/2020 06:26 AM    LDL 89 03/17/2020 06:26 AM    HDL 31 (A) 03/17/2020 06:26 AM    TRIGLYCERIDE 158 (H) 03/17/2020 06:26 AM       Lab Results   Component Value Date/Time    SODIUM 139 03/17/2020 06:26 AM    POTASSIUM 4.3 03/17/2020 06:26 AM    CHLORIDE 104 03/17/2020 06:26 AM    CO2 25 03/17/2020 06:26 AM    GLUCOSE 147 (H) 03/17/2020 06:26 AM     BUN 17 03/17/2020 06:26 AM    CREATININE 0.85 03/17/2020 06:26 AM     Lab Results   Component Value Date/Time    ALKPHOSPHAT 62 03/17/2020 06:26 AM    ASTSGOT 22 03/17/2020 06:26 AM    ALTSGPT 46 03/17/2020 06:26 AM    TBILIRUBIN 0.7 03/17/2020 06:26 AM      Lab Results   Component Value Date/Time    BNPBTYPENAT 6 10/06/2016 02:20 PM      Assessment:     1. Other hyperlipidemia  atorvastatin (LIPITOR) 80 MG tablet   2. Coronary artery disease involving native coronary artery of native heart without angina pectoris     3. Mixed hyperlipidemia     4. ROSALINO (obstructive sleep apnea)         Medical Decision Making:  Today's Assessment / Status / Plan:     Overall doing well with no symptoms exercising and maintaining his weight loss.  Blood pressures elevated today in the office.  I have recommended he check his blood pressure and write it down 3 times a week and follow-up with his PCP for goal blood pressure closer to 120/80.  LDL is suboptimal.  Discussed options.  Recommend increasing his Lipitor from 40 mg daily to 80 mg daily.  Will recheck with me or his PCP hepatic panel and lipid profile.      Follow-up yearly with cardiology

## 2020-11-04 NOTE — PROGRESS NOTES
Patient is overdue for CBC for anemia monitoring. Patient was due for lab draw on 11/03.    10:34 AM: Spoke with patient and reminded him he is due for repeat CBC. Patient will make an appointment with [facility] lab on 11/05. He will also request lab results be from his dialysis be faxed to our office as well. CBC order in system. Subjective:   Yves Ivory is a 55 y.o. male who presents today for all up after STEMI complicated by Arrest.    Hx of anxiety, HLD, obesity, HTN, anxiety, depression, smoking hx, and now CAD s/p STEMI with DENISSE to OM, VF arrest.    He is presented to the emergency department a couple of times for musculoskeletal chest pain and gas pains one of which and left with a stress test showing infarct no ischemia and a repeat echo which was unchanged and normal.    He is active and we discussed many insightful questions today.    Was having a cough with lisinopril and now finds it is acceptable. His blood pressure is elevated. Otherwise he is feeling quite well he's gained some weight and we discussed appetite, and dietary choices today.    He has had no episodes of chest pain, shortness of breath, orthopnea, or peripheral edema.    Past Medical History:   Diagnosis Date   • Anxiety    • CAD (coronary artery disease)     STEMI-S/P DENISSE to OM   • Depression    • Hyperlipidemia    • Hypertension    • Obesity    • Ventricular tachycardia (CMS-HCC)     post STEMI      Past Surgical History:   Procedure Laterality Date   • ANGIOPLASTY     • APPENDECTOMY CHILD     • CARDIAC CATH       No family history on file.  History   Smoking Status   • Former Smoker   • Years: 40.00   • Types: Cigars   Smokeless Tobacco   • Never Used     Comment: Occasional cigar     Allergies   Allergen Reactions   • Brilinta [Ticagrelor] Shortness of Breath     Med put patient in ER twice     Outpatient Encounter Prescriptions as of 10/11/2017   Medication Sig Dispense Refill   • atorvastatin (LIPITOR) 80 MG tablet Take 80 mg by mouth every evening.     • B Complex-Biotin-FA (SUPER B-COMPLEX PO) Take  by mouth every day.     • lisinopril (PRINIVIL) 10 MG Tab Take 1 Tab by mouth every day. 90 Tab 3   • metoprolol (LOPRESSOR) 25 MG Tab Take 0.5 Tabs by mouth 2 times a day. 90 Tab 3   • clopidogrel (PLAVIX) 75 MG Tab Take 1 Tab by mouth every day. 90 Tab  "3   • calcium carbonate (TUMS) 500 MG Chew Tab Take 2,000 mg by mouth every day.     • acetaminophen (TYLENOL) 500 MG Tab Take 500-1,000 mg by mouth every 6 hours as needed.     • aspirin EC 81 MG EC tablet Take 1 Tab by mouth every day. 30 Tab 0   • [DISCONTINUED] lisinopril (PRINIVIL) 5 MG Tab Take 1 Tab by mouth every day. 90 Tab 3   • loperamide (IMODIUM A-D) 2 MG Cap Take 2 mg by mouth 4 times a day as needed for Diarrhea.     • diphenoxylate-atropine (LOMOTIL) 2.5-0.025 MG/5ML Liquid Take 5 mL by mouth 4 times a day as needed. 1 Bottle 0   • ranitidine (ZANTAC) 150 MG Tab Take 1 Tab by mouth as needed for Heartburn. 90 Tab 3   • atorvastatin (LIPITOR) 40 MG Tab Take 1 Tab by mouth every evening. 90 Tab 3   • magnesium oxide (MAG-OX) 400 (241.3 MG) MG Tab tablet Take 1 Tab by mouth as needed. 90 Tab 3   • Coenzyme Q10 (CO Q 10 PO) Take  by mouth every day.     • lorazepam (ATIVAN) 1 MG Tab Take 1 Tab by mouth 2 times a day as needed for Anxiety. 60 Tab 2     No facility-administered encounter medications on file as of 10/11/2017.      ROS       Objective:   BP (!) 162/92   Pulse 96   Ht 1.778 m (5' 10\")   Wt 106.6 kg (235 lb)   SpO2 92%   BMI 33.72 kg/m²     Physical Exam   Constitutional: He is oriented to person, place, and time. He appears well-developed and well-nourished. No distress.   HENT:   Head: Normocephalic and atraumatic.   Mouth/Throat: Oropharynx is clear and moist.   Eyes: Conjunctivae and EOM are normal. Pupils are equal, round, and reactive to light. No scleral icterus.   Neck: Normal range of motion. No JVD present. No tracheal deviation present. No thyromegaly present.   Cardiovascular: Normal rate, regular rhythm, S1 normal, normal heart sounds and intact distal pulses.  PMI is not displaced.  Exam reveals no gallop and no friction rub.    No murmur heard.  Pulses:       Carotid pulses are 2+ on the right side, and 2+ on the left side.       Radial pulses are 2+ on the right side, and " 2+ on the left side.        Dorsalis pedis pulses are 2+ on the right side, and 2+ on the left side.        Posterior tibial pulses are 2+ on the right side, and 2+ on the left side.   Pulmonary/Chest: Effort normal and breath sounds normal. No respiratory distress. He has no wheezes. He has no rales.   Abdominal: Soft. Bowel sounds are normal. He exhibits no distension and no pulsatile midline mass. There is no tenderness. There is no guarding.   Musculoskeletal: Normal range of motion. He exhibits no edema.   Neurological: He is alert and oriented to person, place, and time. No cranial nerve deficit.   Skin: Skin is warm and dry. No rash noted. He is not diaphoretic. No erythema.   Psychiatric: His behavior is normal. Thought content normal. His mood appears anxious.   Jittery and restless in exam   Nursing note and vitals reviewed.    7/7/16 ANGIOGRAM POSTPROCEDURE DIAGNOSES:  1.  Coronary artery disease including occluded proximal obtuse marginal    branch, additional nonobstructive mid left anterior descending artery    stenosis.  2.  Successful thrombectomy/PTCA/stent placement of the proximal to mid obtuse   marginal branch with 3.0x32-mm Promus PREMIER drug-eluting stent.  3.  Angio-Seal closure.    7/8/16 ECHO CONCLUSIONS  Left ventricular ejection fraction is visually estimated to be 55%,   inferior hypokinesis.  Grade II-III diastolic dysfunction.  Mild concentric left ventricular   hypertrophy.  Dilated inferior vena cava with inspiratory collapse.  No significant valve disease or flow abnormalities.   No prior study is available for comparison.     Lab Results   Component Value Date/Time    WBC 8.6 10/06/2016 02:20 PM    RBC 5.26 10/06/2016 02:20 PM    HEMOGLOBIN 16.0 10/06/2016 02:20 PM    HEMATOCRIT 45.9 10/06/2016 02:20 PM    MCV 87.3 10/06/2016 02:20 PM    MCH 30.4 10/06/2016 02:20 PM    MCHC 34.9 10/06/2016 02:20 PM    MPV 9.6 10/06/2016 02:20 PM      Lab Results   Component Value Date/Time     CHOLSTRLTOT 144 08/17/2016 07:09 AM    LDL 71 08/17/2016 07:09 AM    HDL 40 08/17/2016 07:09 AM    TRIGLYCERIDE 164 (H) 08/17/2016 07:09 AM       Lab Results   Component Value Date/Time    SODIUM 138 10/06/2016 02:20 PM    POTASSIUM 3.7 10/06/2016 02:20 PM    CHLORIDE 104 10/06/2016 02:20 PM    CO2 22 10/06/2016 02:20 PM    GLUCOSE 90 10/06/2016 02:20 PM    BUN 18 10/06/2016 02:20 PM    CREATININE 0.78 10/06/2016 02:20 PM     Lab Results   Component Value Date/Time    ALKPHOSPHAT 67 10/06/2016 02:20 PM    ASTSGOT 21 10/06/2016 02:20 PM    ALTSGPT 49 10/06/2016 02:20 PM    TBILIRUBIN 1.0 10/06/2016 02:20 PM      Lab Results   Component Value Date/Time    BNPBTYPENAT 6 10/06/2016 02:20 PM      Assessment:     1. Coronary artery disease involving native coronary artery of native heart without angina pectoris  lisinopril (PRINIVIL) 10 MG Tab   2. VT (ventricular tachycardia) (CMS-HCC)     3. Obesity (BMI 30-39.9)     4. Anxiety         Medical Decision Making:  Today's Assessment / Status / Plan:     Doing well. No cardiac symptoms. Mild residual nonobstructive CAD. Preserved LVEF. Blood pressure is suboptimal. Most recent lipid profile is reasonable. He is due for a recheck.    1. Diet exercise and weight loss discussed extensively  2. Increase lisinopril to 10 mg daily  3. Continue medical other therapy  4. Discussed his dual antiplatelet therapy duration. He would like to continue it past 1 year for the time being while he attempts to lose weight and feel more confident with stopping. This is a very reasonable thing to do as well. He may stop it for procedures as necessary and continue aspirin through any procedures upcoming.

## 2020-12-16 DIAGNOSIS — I25.10 CORONARY ARTERY DISEASE INVOLVING NATIVE CORONARY ARTERY OF NATIVE HEART WITHOUT ANGINA PECTORIS: ICD-10-CM

## 2020-12-16 DIAGNOSIS — E78.49 OTHER HYPERLIPIDEMIA: ICD-10-CM

## 2020-12-18 RX ORDER — DILTIAZEM HYDROCHLORIDE 120 MG/1
CAPSULE, COATED, EXTENDED RELEASE ORAL
Qty: 90 CAP | Refills: 2 | Status: SHIPPED | OUTPATIENT
Start: 2020-12-18 | End: 2020-12-28 | Stop reason: SDUPTHER

## 2020-12-18 RX ORDER — ATORVASTATIN CALCIUM 80 MG/1
TABLET, FILM COATED ORAL
Qty: 45 TAB | Refills: 2 | OUTPATIENT
Start: 2020-12-18

## 2020-12-18 RX ORDER — LISINOPRIL 10 MG/1
TABLET ORAL
Qty: 90 TAB | Refills: 2 | Status: SHIPPED | OUTPATIENT
Start: 2020-12-18 | End: 2021-05-05 | Stop reason: SDUPTHER

## 2020-12-26 ENCOUNTER — PATIENT MESSAGE (OUTPATIENT)
Dept: CARDIOLOGY | Facility: MEDICAL CENTER | Age: 60
End: 2020-12-26

## 2020-12-28 DIAGNOSIS — I25.10 CORONARY ARTERY DISEASE INVOLVING NATIVE CORONARY ARTERY OF NATIVE HEART WITHOUT ANGINA PECTORIS: ICD-10-CM

## 2020-12-28 RX ORDER — DILTIAZEM HYDROCHLORIDE 120 MG/1
120 CAPSULE, COATED, EXTENDED RELEASE ORAL
Qty: 90 CAP | Refills: 2 | Status: SHIPPED | OUTPATIENT
Start: 2020-12-28 | End: 2021-09-13

## 2020-12-28 NOTE — PATIENT COMMUNICATION
Called [facility] pharmacy and they confirmed prescription for dilitiazem was not received. Refill sent electronically.

## 2021-03-15 DIAGNOSIS — Z23 NEED FOR VACCINATION: ICD-10-CM

## 2021-04-16 ENCOUNTER — TELEMEDICINE (OUTPATIENT)
Dept: MEDICAL GROUP | Facility: PHYSICIAN GROUP | Age: 61
End: 2021-04-16
Payer: COMMERCIAL

## 2021-04-16 VITALS — WEIGHT: 235 LBS | BODY MASS INDEX: 32.9 KG/M2 | HEIGHT: 71 IN

## 2021-04-16 DIAGNOSIS — E78.2 MIXED HYPERLIPIDEMIA: ICD-10-CM

## 2021-04-16 DIAGNOSIS — E66.9 OBESITY (BMI 30-39.9): ICD-10-CM

## 2021-04-16 DIAGNOSIS — Z12.11 SCREEN FOR COLON CANCER: ICD-10-CM

## 2021-04-16 DIAGNOSIS — G47.33 OSA (OBSTRUCTIVE SLEEP APNEA): ICD-10-CM

## 2021-04-16 DIAGNOSIS — I10 ESSENTIAL HYPERTENSION: ICD-10-CM

## 2021-04-16 PROCEDURE — 99214 OFFICE O/P EST MOD 30 MIN: CPT | Mod: 95,CR | Performed by: FAMILY MEDICINE

## 2021-04-16 RX ORDER — ZOLPIDEM TARTRATE 5 MG/1
5 TABLET ORAL NIGHTLY PRN
Qty: 14 TABLET | Refills: 0 | Status: SHIPPED | OUTPATIENT
Start: 2021-04-16 | End: 2021-04-30

## 2021-04-16 ASSESSMENT — FIBROSIS 4 INDEX: FIB4 SCORE: 0.74

## 2021-04-16 ASSESSMENT — PATIENT HEALTH QUESTIONNAIRE - PHQ9: CLINICAL INTERPRETATION OF PHQ2 SCORE: 0

## 2021-04-16 NOTE — PROGRESS NOTES
Virtual Visit: Established Patient   This visit was conducted via Zoom using secure and encrypted videoconferencing technology. The patient was in a private location in the state of [place].    The patient's identity was confirmed and verbal consent was obtained for this virtual visit.    Subjective:   CC:   Chief Complaint   Patient presents with   • Difficulty Sleeping   • Requesting Labs       Yves Ivory is a 60 y.o. male presenting for evaluation and management of:    Blood pressure, sleep apnea, and preventative wellness screenings. Patient notes that his position was recently downsized that his current employer and is looking to complete all of his wellness screenings and get his blood pressure in line prior to losing his insurance. Patient states that he is only getting 1 to 2 hours of sleep before waking up. Patient is taking Tylenol PM to help with sleep. Patient is also using his BiPAP at night but has not followed up with sleep medicine in over a year due to Covid. Blood pressure reportedly elevated at recent dentist visit.    ROS   Denies any recent fevers or chills. No nausea or vomiting. No chest pains or shortness of breath.     Allergies   Allergen Reactions   • Brilinta [Ticagrelor] Shortness of Breath     Med put patient in ER twice       Current medicines (including changes today)  Current Outpatient Medications   Medication Sig Dispense Refill   • zolpidem (AMBIEN) 5 MG Tab Take 1 tablet by mouth at bedtime as needed for Sleep for up to 14 days. 14 tablet 0   • DILTIAZem CD (CARDIZEM CD) 120 MG CAPSULE SR 24 HR Take 1 Cap by mouth every day. 90 Cap 2   • lisinopril (PRINIVIL) 10 MG Tab TAKE 1 TABLET BY MOUTH EVERY DAY 90 Tab 2   • atorvastatin (LIPITOR) 80 MG tablet Take 1 Tab by mouth every evening. 90 Tab 3   • sildenafil citrate (VIAGRA) 100 MG tablet Take 0.5 Tabs by mouth as needed for Erectile Dysfunction. 10 Tab 1   • ipratropium (ATROVENT) 0.06 % Solution Spray 2 Sprays in  "nose 4 times a day. 1 Bottle 4   • Calcium Carbonate Antacid (SHAMA-SELTZER ANTACID PO) Take  by mouth.     • Phenylephrine HCl (AFRIN ALLERGY NA) Klondike  in nose.     • aspirin EC 81 MG EC tablet Take 1 Tab by mouth every day. 30 Tab 0     No current facility-administered medications for this visit.       Patient Active Problem List    Diagnosis Date Noted   • Obesity (BMI 30-39.9) 12/12/2016     Priority: Medium   • Coronary artery disease involving native coronary artery of native heart without angina pectoris 07/26/2016     Priority: Medium   • HLD (hyperlipidemia) 07/26/2016     Priority: Medium   • VT (ventricular tachycardia) (HCC) 07/26/2016     Priority: Medium   • STEMI (ST elevation myocardial infarction) (HCC) 07/07/2016     Priority: Medium   • Abdominal gas pain 12/12/2016     Priority: Low   • Preventative health care 12/12/2016     Priority: Low   • Cough 12/12/2016     Priority: Low   • Cigar smoker 08/22/2016     Priority: Low   • Anxiety 08/22/2016     Priority: Low   • Vasculogenic erectile dysfunction 07/14/2020   • Essential hypertension 07/14/2020   • Biliary colic 01/10/2020   • ROSALINO (obstructive sleep apnea) 04/25/2019   • Former smoker 04/25/2019   • Prediabetes 04/22/2018       Family History   Problem Relation Age of Onset   • Cancer Mother         breast cancer   • Heart Disease Father 38        heart transplant, 3 MI's, multiple bypass   • Stroke Maternal Grandmother        He  has a past medical history of Anxiety, Bronchitis, CAD (coronary artery disease), Chickenpox, Coronary heart disease, Depression, Mongolian measles, Hyperlipidemia, Hypertension, Influenza, Obesity, Sleep apnea, Ventricular tachycardia (HCC), and Whooping cough.  He  has a past surgical history that includes zzz cardiac cath; angioplasty; appendectomy child; and tonsillectomy.       Objective:   Ht 1.803 m (5' 11\") Comment: per patient  Wt 107 kg (235 lb) Comment: Per patient  BMI 32.78 kg/m²     Physical " Exam:  Constitutional: Alert, no distress, well-groomed.  Skin: No rashes in visible areas.  Eye: Round. Conjunctiva clear, lids normal. No icterus.   ENMT: Lips pink without lesions, good dentition, moist mucous membranes. Phonation normal.  Neck: No masses, no thyromegaly. Moves freely without pain.  Respiratory: Unlabored respiratory effort, no cough or audible wheeze  Psych: Alert and oriented x3, normal affect and mood.       Assessment and Plan:   The following treatment plan was discussed:     1. Obesity (BMI 30-39.9)    2. Essential hypertension  - CBC WITH DIFFERENTIAL; Future  - Comp Metabolic Panel; Future  - Lipid Profile; Future    3. Mixed hyperlipidemia  - CBC WITH DIFFERENTIAL; Future  - Comp Metabolic Panel; Future  - Lipid Profile; Future    4. ROSALINO (obstructive sleep apnea)  - zolpidem (AMBIEN) 5 MG Tab; Take 1 tablet by mouth at bedtime as needed for Sleep for up to 14 days.  Dispense: 14 tablet; Refill: 0    5. Screen for colon cancer  - REFERRAL TO GI FOR COLONOSCOPY    Counseled on follow-up in 2 weeks for blood pressure check and lab review. Short course of Ambien sent for sleep. Referral to GI for colonoscopy placed. Continue lisinopril 10 mg daily, counseled on possible increase to 20 mg daily. Continue atorvastatin. Continue aspirin 81 mg daily. Follow-up sooner if new symptoms arise or other concerns present themselves.    Follow-up: Return in about 2 weeks (around 4/30/2021) for Pending Labs, BP check.       Please note that this dictation was created using voice recognition software. I have worked with consultants from the vendor as well as technical experts from [employer] to optimize the interface. I have made every reasonable attempt to correct obvious errors, but I expect that there are errors of grammar and possibly content that I did not discover before finalizing the note.

## 2021-04-21 ENCOUNTER — HOSPITAL ENCOUNTER (OUTPATIENT)
Dept: LAB | Facility: MEDICAL CENTER | Age: 61
End: 2021-04-21
Attending: FAMILY MEDICINE
Payer: COMMERCIAL

## 2021-04-21 DIAGNOSIS — E78.2 MIXED HYPERLIPIDEMIA: ICD-10-CM

## 2021-04-21 DIAGNOSIS — I10 ESSENTIAL HYPERTENSION: ICD-10-CM

## 2021-04-21 LAB
BASOPHILS # BLD AUTO: 0.8 % (ref 0–1.8)
BASOPHILS # BLD: 0.05 K/UL (ref 0–0.12)
EOSINOPHIL # BLD AUTO: 0.17 K/UL (ref 0–0.51)
EOSINOPHIL NFR BLD: 2.7 % (ref 0–6.9)
ERYTHROCYTE [DISTWIDTH] IN BLOOD BY AUTOMATED COUNT: 42.1 FL (ref 35.9–50)
HCT VFR BLD AUTO: 48.9 % (ref 42–52)
HGB BLD-MCNC: 16.7 G/DL (ref 14–18)
IMM GRANULOCYTES # BLD AUTO: 0.02 K/UL (ref 0–0.11)
IMM GRANULOCYTES NFR BLD AUTO: 0.3 % (ref 0–0.9)
LYMPHOCYTES # BLD AUTO: 2.25 K/UL (ref 1–4.8)
LYMPHOCYTES NFR BLD: 35.1 % (ref 22–41)
MCH RBC QN AUTO: 30.7 PG (ref 27–33)
MCHC RBC AUTO-ENTMCNC: 34.2 G/DL (ref 33.7–35.3)
MCV RBC AUTO: 89.9 FL (ref 81.4–97.8)
MONOCYTES # BLD AUTO: 0.56 K/UL (ref 0–0.85)
MONOCYTES NFR BLD AUTO: 8.7 % (ref 0–13.4)
NEUTROPHILS # BLD AUTO: 3.36 K/UL (ref 1.82–7.42)
NEUTROPHILS NFR BLD: 52.4 % (ref 44–72)
NRBC # BLD AUTO: 0 K/UL
NRBC BLD-RTO: 0 /100 WBC
PLATELET # BLD AUTO: 251 K/UL (ref 164–446)
PMV BLD AUTO: 10.3 FL (ref 9–12.9)
RBC # BLD AUTO: 5.44 M/UL (ref 4.7–6.1)
WBC # BLD AUTO: 6.4 K/UL (ref 4.8–10.8)

## 2021-04-21 PROCEDURE — 80053 COMPREHEN METABOLIC PANEL: CPT

## 2021-04-21 PROCEDURE — 85025 COMPLETE CBC W/AUTO DIFF WBC: CPT

## 2021-04-21 PROCEDURE — 80061 LIPID PANEL: CPT

## 2021-04-21 PROCEDURE — 36415 COLL VENOUS BLD VENIPUNCTURE: CPT

## 2021-04-22 LAB
ALBUMIN SERPL BCP-MCNC: 4.7 G/DL (ref 3.2–4.9)
ALBUMIN/GLOB SERPL: 1.9 G/DL
ALP SERPL-CCNC: 92 U/L (ref 30–99)
ALT SERPL-CCNC: 63 U/L (ref 2–50)
ANION GAP SERPL CALC-SCNC: 8 MMOL/L (ref 7–16)
AST SERPL-CCNC: 39 U/L (ref 12–45)
BILIRUB SERPL-MCNC: 0.7 MG/DL (ref 0.1–1.5)
BUN SERPL-MCNC: 17 MG/DL (ref 8–22)
CALCIUM SERPL-MCNC: 9.3 MG/DL (ref 8.5–10.5)
CHLORIDE SERPL-SCNC: 103 MMOL/L (ref 96–112)
CHOLEST SERPL-MCNC: 171 MG/DL (ref 100–199)
CO2 SERPL-SCNC: 27 MMOL/L (ref 20–33)
CREAT SERPL-MCNC: 0.65 MG/DL (ref 0.5–1.4)
FASTING STATUS PATIENT QL REPORTED: NORMAL
GLOBULIN SER CALC-MCNC: 2.5 G/DL (ref 1.9–3.5)
GLUCOSE SERPL-MCNC: 150 MG/DL (ref 65–99)
HDLC SERPL-MCNC: 31 MG/DL
LDLC SERPL CALC-MCNC: 99 MG/DL
POTASSIUM SERPL-SCNC: 4.8 MMOL/L (ref 3.6–5.5)
PROT SERPL-MCNC: 7.2 G/DL (ref 6–8.2)
SODIUM SERPL-SCNC: 138 MMOL/L (ref 135–145)
TRIGL SERPL-MCNC: 204 MG/DL (ref 0–149)

## 2021-04-29 ENCOUNTER — NON-PROVIDER VISIT (OUTPATIENT)
Dept: MEDICAL GROUP | Facility: PHYSICIAN GROUP | Age: 61
End: 2021-04-29
Payer: COMMERCIAL

## 2021-04-29 VITALS
SYSTOLIC BLOOD PRESSURE: 126 MMHG | DIASTOLIC BLOOD PRESSURE: 84 MMHG | TEMPERATURE: 97.3 F | HEART RATE: 84 BPM | OXYGEN SATURATION: 95 %

## 2021-04-29 NOTE — PROGRESS NOTES
Jose J Ivory is a 60 y.o. male here for a non-provider visit for bp check     Vitals:    04/29/21 1314   BP: 126/84   Pulse: 84   Temp: 36.3 °C (97.3 °F)   TempSrc: Temporal   SpO2: 95%     If abnormal, was an in office provider notified today? Not Indicated  Routed to PCP? Yes        FYI     Pt says he started taking 20mg of lisinopril at night and he wasn't seeing much deference in bp. He started to take 10 in the am and 10 at pm and noticed that his bp stayed in the 120is over 80 tyrell range.

## 2021-05-05 ENCOUNTER — OFFICE VISIT (OUTPATIENT)
Dept: MEDICAL GROUP | Facility: PHYSICIAN GROUP | Age: 61
End: 2021-05-05
Payer: COMMERCIAL

## 2021-05-05 VITALS
HEART RATE: 93 BPM | SYSTOLIC BLOOD PRESSURE: 126 MMHG | WEIGHT: 250 LBS | OXYGEN SATURATION: 94 % | TEMPERATURE: 98.1 F | HEIGHT: 71 IN | DIASTOLIC BLOOD PRESSURE: 74 MMHG | BODY MASS INDEX: 35 KG/M2

## 2021-05-05 DIAGNOSIS — E11.8 TYPE II DIABETES MELLITUS WITH COMPLICATION (HCC): ICD-10-CM

## 2021-05-05 DIAGNOSIS — Z23 NEED FOR VACCINATION: ICD-10-CM

## 2021-05-05 DIAGNOSIS — R73.03 PREDIABETES: ICD-10-CM

## 2021-05-05 DIAGNOSIS — E66.9 OBESITY (BMI 30-39.9): ICD-10-CM

## 2021-05-05 DIAGNOSIS — I25.10 CORONARY ARTERY DISEASE INVOLVING NATIVE CORONARY ARTERY OF NATIVE HEART WITHOUT ANGINA PECTORIS: ICD-10-CM

## 2021-05-05 DIAGNOSIS — I10 ESSENTIAL HYPERTENSION: ICD-10-CM

## 2021-05-05 LAB
HBA1C MFR BLD: 6.6 % (ref 0–5.6)
INT CON NEG: NEGATIVE
INT CON POS: POSITIVE

## 2021-05-05 PROCEDURE — 90750 HZV VACC RECOMBINANT IM: CPT | Performed by: FAMILY MEDICINE

## 2021-05-05 PROCEDURE — 90471 IMMUNIZATION ADMIN: CPT | Performed by: FAMILY MEDICINE

## 2021-05-05 PROCEDURE — 83036 HEMOGLOBIN GLYCOSYLATED A1C: CPT | Performed by: FAMILY MEDICINE

## 2021-05-05 PROCEDURE — 99214 OFFICE O/P EST MOD 30 MIN: CPT | Mod: 25 | Performed by: FAMILY MEDICINE

## 2021-05-05 RX ORDER — LISINOPRIL 10 MG/1
10 TABLET ORAL 2 TIMES DAILY
Qty: 180 TABLET | Refills: 4 | Status: SHIPPED | OUTPATIENT
Start: 2021-05-05 | End: 2022-04-14

## 2021-05-05 ASSESSMENT — ENCOUNTER SYMPTOMS
NAUSEA: 0
SHORTNESS OF BREATH: 0
VOMITING: 0
CHILLS: 0
ABDOMINAL PAIN: 0
COUGH: 0
DIARRHEA: 0
DIZZINESS: 0
FEVER: 0

## 2021-05-05 ASSESSMENT — FIBROSIS 4 INDEX: FIB4 SCORE: 1.14

## 2021-05-05 NOTE — PROGRESS NOTES
CC:Diagnoses of Need for vaccination, Coronary artery disease involving native coronary artery of native heart without angina pectoris, Prediabetes, Obesity (BMI 30-39.9), and Essential hypertension were pertinent to this visit.      HISTORY OF PRESENT ILLNESS: Patient is a 60 y.o. male established patient who presents today to follow-up on blood pressure, A1c.  Patient also due for [employer]    Health Maintenance: Completed    No problem-specific Assessment & Plan notes found for this encounter.      Patient Active Problem List    Diagnosis Date Noted   • Obesity (BMI 30-39.9) 12/12/2016   • Coronary artery disease involving native coronary artery of native heart without angina pectoris 07/26/2016   • HLD (hyperlipidemia) 07/26/2016   • VT (ventricular tachycardia) (Formerly [facility]) 07/26/2016   • STEMI (ST elevation myocardial infarction) (Former[facility]) 07/07/2016   • Abdominal gas pain 12/12/2016   • Preventative health care 12/12/2016   • Cough 12/12/2016   • Cigar smoker 08/22/2016   • Anxiety 08/22/2016   • Vasculogenic erectile dysfunction 07/14/2020   • Essential hypertension 07/14/2020   • Biliary colic 01/10/2020   • ROSALINO (obstructive sleep apnea) 04/25/2019   • Former smoker 04/25/2019   • Prediabetes 04/22/2018      Allergies:Brilinta [ticagrelor]    Current Outpatient Medications   Medication Sig Dispense Refill   • lisinopril (PRINIVIL) 10 MG Tab Take 1 tablet by mouth 2 times a day. 1 tab in the morning and 1 tab at night 180 tablet 4   • DILTIAZem CD (CARDIZEM CD) 120 MG CAPSULE SR 24 HR Take 1 Cap by mouth every day. 90 Cap 2   • atorvastatin (LIPITOR) 80 MG tablet Take 1 Tab by mouth every evening. 90 Tab 3   • sildenafil citrate (VIAGRA) 100 MG tablet Take 0.5 Tabs by mouth as needed for Erectile Dysfunction. 10 Tab 1   • ipratropium (ATROVENT) 0.06 % Solution Spray 2 Sprays in nose 4 times a day. 1 Bottle 4   • Calcium Carbonate Antacid (SHAMA-SELTZER ANTACID PO) Take  by mouth.     • Phenylephrine HCl (AFRIN ALLERGY  "NA) Spray  in nose.     • aspirin EC 81 MG EC tablet Take 1 Tab by mouth every day. 30 Tab 0     No current facility-administered medications for this visit.       Social History     Tobacco Use   • Smoking status: Former Smoker     Packs/day: 1.00     Years: 40.00     Pack years: 40.00     Types: Cigars     Quit date:      Years since quittin.3   • Smokeless tobacco: Former User     Types: Chew     Quit date:    • Tobacco comment: Occasional cigar   Substance Use Topics   • Alcohol use: Yes     Comment: 6-7/week   • Drug use: Yes     Types: Marijuana     Comment: weekly     Social History     Social History Narrative   • Not on file       Family History   Problem Relation Age of Onset   • Cancer Mother         breast cancer   • Heart Disease Father 38        heart transplant, 3 MI's, multiple bypass   • Stroke Maternal Grandmother        Review of Systems:  Review of Systems   Constitutional: Negative for chills and fever.   Respiratory: Negative for cough and shortness of breath.    Cardiovascular: Negative for chest pain.   Gastrointestinal: Negative for abdominal pain, diarrhea, nausea and vomiting.   Neurological: Negative for dizziness.   All other systems reviewed and are negative.       Exam:    /74 (BP Location: Right arm, Patient Position: Sitting, BP Cuff Size: Adult)   Pulse 93   Temp 36.7 °C (98.1 °F) (Temporal)   Ht 1.803 m (5' 11\")   Wt 113 kg (250 lb)   SpO2 94%  Body mass index is 34.87 kg/m².    General:  Well nourished, well developed male in NAD  Psych: Normal Behavior, Normal affect    LABS: 2021: Results reviewed and discussed with the patient, questions answered.    Assessment/Plan:  1. Need for vaccination  - Shingrix Vaccine    2. Coronary artery disease involving native coronary artery of native heart without angina pectoris  5. Essential hypertension  Chronic stable medical condition.  Currently well controlled on lisinopril 10 mg twice daily.  Trial of 20 mg " lisinopril at night did not seem to have efficacy according to patient's home blood pressure log.  Continue lisinopril 10 mg twice daily  - lisinopril (PRINIVIL) 10 MG Tab; Take 1 tablet by mouth 2 times a day. 1 tab in the morning and 1 tab at night  Dispense: 180 tablet; Refill: 4    3.  Controlled type 2 diabetes with hyperlipidemia  Acute medical condition.  New diagnosis with Hemoglobin A1c 6.6%.  Counseled on diet, exercise, and lifestyle changes to help control blood sugars.  Follow-up in 5 months to evaluate effectiveness of diet controlled measures.  - POCT  A1C    4. Obesity (BMI 30-39.9)  Chronic ongoing medical condition.  BMI 34.87.  Counseled on diet, exercise, and lifestyle changes to control weight and how this will affect his new diagnosis of diabetes    Follow-up in 5 months    Please note that this dictation was created using voice recognition software. I have worked with consultants from the vendor as well as technical experts from [employer] to optimize the interface. I have made every reasonable attempt to correct obvious errors, but I expect that there are errors of grammar and possibly content that I did not discover before finalizing the note.

## 2021-09-01 ENCOUNTER — OFFICE VISIT (OUTPATIENT)
Dept: CARDIOLOGY | Facility: MEDICAL CENTER | Age: 61
End: 2021-09-01
Payer: COMMERCIAL

## 2021-09-01 VITALS
BODY MASS INDEX: 33.77 KG/M2 | WEIGHT: 241.2 LBS | HEIGHT: 71 IN | DIASTOLIC BLOOD PRESSURE: 80 MMHG | HEART RATE: 91 BPM | OXYGEN SATURATION: 94 % | RESPIRATION RATE: 14 BRPM | SYSTOLIC BLOOD PRESSURE: 138 MMHG

## 2021-09-01 DIAGNOSIS — E78.2 MIXED HYPERLIPIDEMIA: ICD-10-CM

## 2021-09-01 DIAGNOSIS — I25.10 CORONARY ARTERY DISEASE INVOLVING NATIVE CORONARY ARTERY OF NATIVE HEART WITHOUT ANGINA PECTORIS: ICD-10-CM

## 2021-09-01 DIAGNOSIS — G47.33 OSA (OBSTRUCTIVE SLEEP APNEA): ICD-10-CM

## 2021-09-01 PROCEDURE — 99214 OFFICE O/P EST MOD 30 MIN: CPT | Performed by: INTERNAL MEDICINE

## 2021-09-01 RX ORDER — ROSUVASTATIN CALCIUM 40 MG/1
40 TABLET, COATED ORAL DAILY
Qty: 90 TABLET | Refills: 3 | Status: SHIPPED | OUTPATIENT
Start: 2021-09-01 | End: 2022-08-08

## 2021-09-01 RX ORDER — EZETIMIBE 10 MG/1
10 TABLET ORAL DAILY
Qty: 90 TABLET | Refills: 3 | Status: SHIPPED | OUTPATIENT
Start: 2021-09-01 | End: 2022-08-08

## 2021-09-01 ASSESSMENT — FIBROSIS 4 INDEX: FIB4 SCORE: 1.15

## 2021-09-01 NOTE — PROGRESS NOTES
"Subjective:   Yves Ivory is a 61 y.o. male who presents today for follow-up of CAD hypertension and hyperlipidemia.    Hx of anxiety, HLD, obesity, HTN, anxiety, depression, smoking hx, and now CAD s/p STEMI with DENISSE to OM, VF arrest.    Since last visit he has gained a few pounds since lipid profile despite increasing his Lipitor from 40-80 last visit is actually worsened and not at goal and his blood pressure was higher but has improved with an increase in his lisinopril to its current value today.  He has no symptoms and is \"going like a shark\" during the day, steady without significant peaks in his exertion.    Past Medical History:   Diagnosis Date   • Anxiety    • Bronchitis    • CAD (coronary artery disease)     STEMI-S/P DENISSE to OM   • Chickenpox    • Coronary heart disease    • Depression    • [place] measles    • Hyperlipidemia    • Hypertension    • Influenza    • Obesity    • Sleep apnea    • Ventricular tachycardia (HCC)     post STEMI    • Whooping cough      Past Surgical History:   Procedure Laterality Date   • ANGIOPLASTY     • APPENDECTOMY CHILD     • TONSILLECTOMY     • ZZZ CARDIAC CATH       Family History   Problem Relation Age of Onset   • Cancer Mother         breast cancer   • Heart Disease Father 38        heart transplant, 3 MI's, multiple bypass   • Stroke Maternal Grandmother      Social History     Tobacco Use   Smoking Status Former Smoker   • Packs/day: 1.00   • Years: 40.00   • Pack years: 40.00   • Types: Cigars   • Quit date:    • Years since quittin.6   Smokeless Tobacco Former User   • Types: Chew   • Quit date:    Tobacco Comment    Occasional cigar     Allergies   Allergen Reactions   • Brilinta [Ticagrelor] Shortness of Breath     Med put patient in ER twice     Outpatient Encounter Medications as of 2021   Medication Sig Dispense Refill   • rosuvastatin (CRESTOR) 40 MG tablet Take 1 Tablet by mouth every day. 90 Tablet 3   • ezetimibe (ZETIA) 10 MG Tab " "Take 1 Tablet by mouth every day. 90 Tablet 3   • lisinopril (PRINIVIL) 10 MG Tab Take 1 tablet by mouth 2 times a day. 1 tab in the morning and 1 tab at night 180 tablet 4   • DILTIAZem CD (CARDIZEM CD) 120 MG CAPSULE SR 24 HR Take 1 Cap by mouth every day. 90 Cap 2   • sildenafil citrate (VIAGRA) 100 MG tablet Take 0.5 Tabs by mouth as needed for Erectile Dysfunction. 10 Tab 1   • Calcium Carbonate Antacid (SHAMA-SELTZER ANTACID PO) Take  by mouth.     • Phenylephrine HCl (AFRIN ALLERGY NA) [place]  in nose.     • aspirin EC 81 MG EC tablet Take 1 Tab by mouth every day. 30 Tab 0   • [DISCONTINUED] atorvastatin (LIPITOR) 80 MG tablet Take 1 Tab by mouth every evening. 90 Tab 3   • ipratropium (ATROVENT) 0.06 % Solution Spray 2 Sprays in nose 4 times a day. 1 Bottle 4     No facility-administered encounter medications on file as of 9/1/2021.     Review of Systems   All other systems reviewed and are negative.       Objective:   /80 (BP Location: Left arm, Patient Position: Sitting, BP Cuff Size: Adult)   Pulse 91   Resp 14   Ht 1.803 m (5' 11\")   Wt 109 kg (241 lb 3.2 oz)   SpO2 94%   BMI 33.64 kg/m²     Physical Exam  Vitals reviewed.   Constitutional:       General: He is not in acute distress.     Appearance: He is well-developed. He is not diaphoretic.   HENT:      Head: Normocephalic and atraumatic.      Right Ear: External ear normal.      Left Ear: External ear normal.   Eyes:      General: No scleral icterus.        Right eye: No discharge.         Left eye: No discharge.      Conjunctiva/sclera: Conjunctivae normal.      Pupils: Pupils are equal, round, and reactive to light.   Neck:      Thyroid: No thyromegaly.      Vascular: No JVD.      Trachea: No tracheal deviation.   Cardiovascular:      Rate and Rhythm: Normal rate and regular rhythm.      Chest Wall: PMI is not displaced.      Pulses:           Carotid pulses are 2+ on the right side and 2+ on the left side.       Radial pulses are 2+ on " the left side.        Popliteal pulses are 2+ on the right side and 2+ on the left side.        Dorsalis pedis pulses are 2+ on the right side and 2+ on the left side.        Posterior tibial pulses are 2+ on the right side and 2+ on the left side.      Heart sounds: No murmur heard.   No friction rub. No gallop.    Pulmonary:      Effort: Pulmonary effort is normal. No respiratory distress.      Breath sounds: Normal breath sounds. No wheezing or rales.   Chest:      Chest wall: No tenderness.   Abdominal:      General: Bowel sounds are normal. There is no distension.      Palpations: Abdomen is soft.      Tenderness: There is no abdominal tenderness.   Musculoskeletal:         General: No tenderness or deformity. Normal range of motion.      Cervical back: Normal range of motion and neck supple.   Skin:     General: Skin is warm and dry.      Coloration: Skin is not pale.      Findings: No erythema or rash.   Neurological:      Mental Status: He is alert and oriented to person, place, and time.      Cranial Nerves: No cranial nerve deficit (cranial nerves II through XII grossly intact).      Coordination: Coordination normal.   Psychiatric:         Behavior: Behavior normal.         Thought Content: Thought content normal.       7/7/16 ANGIOGRAM POSTPROCEDURE DIAGNOSES:  1.  Coronary artery disease including occluded proximal obtuse marginal    branch, additional nonobstructive mid left anterior descending artery    stenosis.  2.  Successful thrombectomy/PTCA/stent placement of the proximal to mid obtuse   marginal branch with 3.0x32-mm Promus PREMIER drug-eluting stent.  3.  Angio-Seal closure.    7/8/16 ECHO CONCLUSIONS  Left ventricular ejection fraction is visually estimated to be 55%,   inferior hypokinesis.  Grade II-III diastolic dysfunction.  Mild concentric left ventricular   hypertrophy.  Dilated inferior vena cava with inspiratory collapse.  No significant valve disease or flow abnormalities.   No prior  study is available for comparison.     Lab Results   Component Value Date/Time    WBC 6.4 04/21/2021 06:56 AM    RBC 5.44 04/21/2021 06:56 AM    HEMOGLOBIN 16.7 04/21/2021 06:56 AM    HEMATOCRIT 48.9 04/21/2021 06:56 AM    MCV 89.9 04/21/2021 06:56 AM    MCH 30.7 04/21/2021 06:56 AM    MCHC 34.2 04/21/2021 06:56 AM    MPV 10.3 04/21/2021 06:56 AM      Lab Results   Component Value Date/Time    CHOLSTRLTOT 171 04/21/2021 06:56 AM    LDL 99 04/21/2021 06:56 AM    HDL 31 (A) 04/21/2021 06:56 AM    TRIGLYCERIDE 204 (H) 04/21/2021 06:56 AM       Lab Results   Component Value Date/Time    SODIUM 138 04/21/2021 06:56 AM    POTASSIUM 4.8 04/21/2021 06:56 AM    CHLORIDE 103 04/21/2021 06:56 AM    CO2 27 04/21/2021 06:56 AM    GLUCOSE 150 (H) 04/21/2021 06:56 AM    BUN 17 04/21/2021 06:56 AM    CREATININE 0.65 04/21/2021 06:56 AM     Lab Results   Component Value Date/Time    ALKPHOSPHAT 92 04/21/2021 06:56 AM    ASTSGOT 39 04/21/2021 06:56 AM    ALTSGPT 63 (H) 04/21/2021 06:56 AM    TBILIRUBIN 0.7 04/21/2021 06:56 AM      Lab Results   Component Value Date/Time    BNPBTYPENAT 6 10/06/2016 02:20 PM      Assessment:     1. Coronary artery disease involving native coronary artery of native heart without angina pectoris  NM-CARDIAC STRESS TEST   2. Mixed hyperlipidemia  rosuvastatin (CRESTOR) 40 MG tablet    ezetimibe (ZETIA) 10 MG Tab    NM-CARDIAC STRESS TEST   3. ROSALINO (obstructive sleep apnea)         Medical Decision Making:  Today's Assessment / Status / Plan:     No symptoms but his activity level seems to be moderate to submoderate and I recommend increasing this.  His lipid profile is worse despite an increase in his statin therapy last visit.  Recommend changing to a more potent statin and adding Zetia.  Therefore we will discontinue his atorvastatin and start rosuvastatin 40 with Zetia 10 daily.  If this is intolerable or ineffective we can add PCSK9 inhibitor therapy to his prior atorvastatin but he would like to avoid  injections which is reasonable.  Otherwise he should continue his other medical treatment.  It has been 5 years since his PCI and as he has only moderately active at this time would recommend stress test surveillance.  If these things are normal recommend following up with a lipid profile with his PCP in 3 months and following the above plan I am happy to make medication with his primary physician Dr. De La Torre if needed as well in the interim.

## 2021-09-10 ENCOUNTER — OFFICE VISIT (OUTPATIENT)
Dept: MEDICAL GROUP | Facility: PHYSICIAN GROUP | Age: 61
End: 2021-09-10
Payer: COMMERCIAL

## 2021-09-10 VITALS
TEMPERATURE: 97.3 F | SYSTOLIC BLOOD PRESSURE: 122 MMHG | HEART RATE: 92 BPM | DIASTOLIC BLOOD PRESSURE: 82 MMHG | BODY MASS INDEX: 32.2 KG/M2 | HEIGHT: 71 IN | WEIGHT: 230 LBS | OXYGEN SATURATION: 94 %

## 2021-09-10 DIAGNOSIS — I25.10 CORONARY ARTERY DISEASE INVOLVING NATIVE CORONARY ARTERY OF NATIVE HEART WITHOUT ANGINA PECTORIS: ICD-10-CM

## 2021-09-10 DIAGNOSIS — I25.2 HISTORY OF ST ELEVATION MYOCARDIAL INFARCTION (STEMI): ICD-10-CM

## 2021-09-10 DIAGNOSIS — I10 ESSENTIAL HYPERTENSION: ICD-10-CM

## 2021-09-10 DIAGNOSIS — R35.1 BENIGN PROSTATIC HYPERPLASIA WITH NOCTURIA: ICD-10-CM

## 2021-09-10 DIAGNOSIS — E11.8 TYPE II DIABETES MELLITUS WITH COMPLICATION (HCC): ICD-10-CM

## 2021-09-10 DIAGNOSIS — N40.1 BENIGN PROSTATIC HYPERPLASIA WITH NOCTURIA: ICD-10-CM

## 2021-09-10 DIAGNOSIS — N52.9 VASCULOGENIC ERECTILE DYSFUNCTION, UNSPECIFIED VASCULOGENIC ERECTILE DYSFUNCTION TYPE: ICD-10-CM

## 2021-09-10 PROCEDURE — 99396 PREV VISIT EST AGE 40-64: CPT | Performed by: FAMILY MEDICINE

## 2021-09-10 RX ORDER — TAMSULOSIN HYDROCHLORIDE 0.4 MG/1
0.4 CAPSULE ORAL
Qty: 30 CAPSULE | Refills: 1 | Status: SHIPPED | OUTPATIENT
Start: 2021-09-10 | End: 2022-04-14

## 2021-09-10 RX ORDER — EMPAGLIFLOZIN 10 MG/1
10 TABLET, FILM COATED ORAL DAILY
Qty: 30 TABLET | Refills: 1 | Status: SHIPPED | OUTPATIENT
Start: 2021-09-10 | End: 2021-10-01 | Stop reason: SDUPTHER

## 2021-09-10 ASSESSMENT — FIBROSIS 4 INDEX: FIB4 SCORE: 1.15

## 2021-09-10 NOTE — PROGRESS NOTES
CC:Diagnoses of Essential hypertension, Type II diabetes mellitus with complication (HCC), Vasculogenic erectile dysfunction, unspecified vasculogenic erectile dysfunction type, History of ST elevation myocardial infarction (STEMI), Coronary artery disease involving native coronary artery of native heart without angina pectoris, and Benign prostatic hyperplasia with nocturia were pertinent to this visit.    Yves Ivory is a 61 y.o. male presents for a routine preventive health exam.    Screening/Preventative Topics:  Advanced directive: Not on file  Osteoporosis Screen/ DEXA: n/a   Diabetes Screening: Has diabetes, currently diet controlled  AAA Screening: n/a  Aspirin Use: 81 mg daily  Diet: Fair  Exercise: Minimal  Screen for depression: PHQ-2: 0   Substance Abuse: None  Safe in relationship     Cancer screening  Colorectal Cancer Screening: Up-to-date  Lung Cancer Screening: Not indicated currently    Infectious disease screening  --Hepatitis C Screening: Negative    Patient Active Problem List    Diagnosis Date Noted   • Vasculogenic erectile dysfunction 07/14/2020   • Essential hypertension 07/14/2020   • Biliary colic 01/10/2020   • ROSALINO (obstructive sleep apnea) 04/25/2019   • Former smoker 04/25/2019   • Type II diabetes mellitus with complication (HCC) 04/22/2018   • Abdominal gas pain 12/12/2016   • Preventative health care 12/12/2016   • Obesity (BMI 30-39.9) 12/12/2016   • Cough 12/12/2016   • Cigar smoker 08/22/2016   • Anxiety 08/22/2016   • Coronary artery disease involving native coronary artery of native heart without angina pectoris 07/26/2016   • HLD (hyperlipidemia) 07/26/2016   • VT (ventricular tachycardia) (HCC) 07/26/2016   • History of ST elevation myocardial infarction (STEMI) 07/07/2016      Allergies:Brilinta [ticagrelor]    Current Outpatient Medications   Medication Sig Dispense Refill   • Empagliflozin (JARDIANCE) 10 MG Tab Take 10 mg by mouth every day. 30 Tablet 1   •  tamsulosin (FLOMAX) 0.4 MG capsule Take 1 Capsule by mouth 1/2 hour after breakfast. 30 Capsule 1   • rosuvastatin (CRESTOR) 40 MG tablet Take 1 Tablet by mouth every day. 90 Tablet 3   • ezetimibe (ZETIA) 10 MG Tab Take 1 Tablet by mouth every day. 90 Tablet 3   • lisinopril (PRINIVIL) 10 MG Tab Take 1 tablet by mouth 2 times a day. 1 tab in the morning and 1 tab at night 180 tablet 4   • DILTIAZem CD (CARDIZEM CD) 120 MG CAPSULE SR 24 HR Take 1 Cap by mouth every day. 90 Cap 2   • Calcium Carbonate Antacid (SHAMA-SELTZER ANTACID PO) Take  by mouth.     • Phenylephrine HCl (AFRIN ALLERGY NA) [place]  in nose.     • aspirin EC 81 MG EC tablet Take 1 Tab by mouth every day. 30 Tab 0   • sildenafil citrate (VIAGRA) 100 MG tablet Take 0.5 Tabs by mouth as needed for Erectile Dysfunction. (Patient not taking: Reported on 9/10/2021) 10 Tab 1   • ipratropium (ATROVENT) 0.06 % Solution Spray 2 Sprays in nose 4 times a day. (Patient not taking: Reported on 9/10/2021) 1 Bottle 4     No current facility-administered medications for this visit.       Social History     Tobacco Use   • Smoking status: Former Smoker     Packs/day: 1.00     Years: 40.00     Pack years: 40.00     Types: Cigars     Quit date:      Years since quittin.6   • Smokeless tobacco: Former User     Types: Chew     Quit date:    • Tobacco comment: Occasional cigar   Vaping Use   • Vaping Use: Former   Substance Use Topics   • Alcohol use: Yes     Comment: 6-7/week   • Drug use: Yes     Types: Marijuana     Comment: weekly     Social History     Social History Narrative   • Not on file       Family History   Problem Relation Age of Onset   • Cancer Mother         breast cancer   • Heart Disease Father 38        heart transplant, 3 MI's, multiple bypass   • Stroke Maternal Grandmother        Review of Systems:    Constitutional: No Fevers, Chills  Eyes: No vision changes  ENT: No hearing changes  Resp: No Shortness of breath  CV: No Chest pain  GI:  "No Nausea/Vomiting  MSK: No weakness  Skin: No rashes  Neuro: No Headaches  Psych: No Suicidal ideations    All remaining systems reviewed and found to be negative, except as stated above.    Exam:    /82 (BP Location: Right arm, Patient Position: Sitting, BP Cuff Size: Adult)   Pulse 92   Temp 36.3 °C (97.3 °F) (Temporal)   Ht 1.803 m (5' 11\")   Wt 104 kg (230 lb)   SpO2 94%  Body mass index is 32.08 kg/m².    General:  Well nourished, well developed male in NAD  HENT: Atraumatic, normocephalic  EYES: Extraocular movements intact, pupils equal and reactive to light  NECK: Supple, FROM  CHEST: No deformities, Equal chest expansion  RESP: Unlabored, no stridor or audible wheeze  ABD: Soft, Nontender, Non-Distended  Extremities: No Clubbing, Cyanosis, or Edema  Skin: Warm/dry, without rashes  Neuro: A/O x 4, CN 2-12 Grossly intact, Motor/sensory grossly intact  Psych: Normal behavior, normal affect    LABS: Hemoglobin A1c 6.5% today: Results reviewed and discussed with the patient, questions answered.      Assessment/Plan:  1. Essential hypertension  Continue lisinopril 10 mg twice daily  - CBC WITH DIFFERENTIAL; Future  - Basic Metabolic Panel; Future    2. Type II diabetes mellitus with complication (HCC)  Starting with Jardiance due to weight loss affect, diabetes, and heart benefit  - Empagliflozin (JARDIANCE) 10 MG Tab; Take 10 mg by mouth every day.  Dispense: 30 Tablet; Refill: 1  - CBC WITH DIFFERENTIAL; Future  - Basic Metabolic Panel; Future  - MICROALBUMIN CREAT RATIO URINE; Future  - HEMOGLOBIN A1C; Future    3. Vasculogenic erectile dysfunction, unspecified vasculogenic erectile dysfunction type  No longer taking Viagra due to side effects    4. History of ST elevation myocardial infarction (STEMI)  Continues to follow-up with cardiology.  Continue rosuvastatin 40 mg daily, Zetia 10 mg daily    5. Coronary artery disease involving native coronary artery of native heart without angina " pectoris  Start empagliflozin and continue follow-up with cardiology and continue with rosuvastatin and Zetia.    6. Benign prostatic hyperplasia with nocturia  New prescription for tamsulosin due to nocturia 4 times per night.  - tamsulosin (FLOMAX) 0.4 MG capsule; Take 1 Capsule by mouth 1/2 hour after breakfast.  Dispense: 30 Capsule; Refill: 1  - PROSTATE SPECIFIC AG SCREENING; Future      Patient up-to-date on preventative health maintenance examinations.  Age-appropriate anticipatory guidance provided today.    Follow-up in 4 months, sooner as needed for any concerns.     Please note that this dictation was created using voice recognition software. I have worked with consultants from the vendor as well as technical experts from [employer] to optimize the interface. I have made every reasonable attempt to correct obvious errors, but I expect that there are errors of grammar and possibly content that I did not discover before finalizing the note.

## 2021-09-11 DIAGNOSIS — I25.10 CORONARY ARTERY DISEASE INVOLVING NATIVE CORONARY ARTERY OF NATIVE HEART WITHOUT ANGINA PECTORIS: ICD-10-CM

## 2021-09-13 RX ORDER — DILTIAZEM HYDROCHLORIDE 120 MG/1
CAPSULE, COATED, EXTENDED RELEASE ORAL
Qty: 90 CAPSULE | Refills: 3 | Status: SHIPPED | OUTPATIENT
Start: 2021-09-13 | End: 2022-08-08

## 2021-09-14 ENCOUNTER — HOSPITAL ENCOUNTER (OUTPATIENT)
Dept: RADIOLOGY | Facility: MEDICAL CENTER | Age: 61
End: 2021-09-14
Attending: INTERNAL MEDICINE
Payer: COMMERCIAL

## 2021-09-14 DIAGNOSIS — I25.10 CORONARY ARTERY DISEASE INVOLVING NATIVE CORONARY ARTERY OF NATIVE HEART WITHOUT ANGINA PECTORIS: ICD-10-CM

## 2021-09-14 DIAGNOSIS — E78.2 MIXED HYPERLIPIDEMIA: ICD-10-CM

## 2021-09-14 PROCEDURE — A9502 TC99M TETROFOSMIN: HCPCS

## 2021-09-14 PROCEDURE — 93018 CV STRESS TEST I&R ONLY: CPT | Performed by: INTERNAL MEDICINE

## 2021-09-14 PROCEDURE — 78452 HT MUSCLE IMAGE SPECT MULT: CPT | Mod: 26 | Performed by: INTERNAL MEDICINE

## 2021-09-30 ENCOUNTER — PATIENT MESSAGE (OUTPATIENT)
Dept: MEDICAL GROUP | Facility: PHYSICIAN GROUP | Age: 61
End: 2021-09-30

## 2021-09-30 DIAGNOSIS — E11.8 TYPE II DIABETES MELLITUS WITH COMPLICATION (HCC): ICD-10-CM

## 2021-10-01 RX ORDER — EMPAGLIFLOZIN 10 MG/1
10 TABLET, FILM COATED ORAL DAILY
Qty: 90 TABLET | Refills: 4 | Status: SHIPPED | OUTPATIENT
Start: 2021-10-01 | End: 2022-04-14 | Stop reason: SDUPTHER

## 2021-11-04 ENCOUNTER — TELEPHONE (OUTPATIENT)
Dept: MEDICAL GROUP | Facility: PHYSICIAN GROUP | Age: 61
End: 2021-11-04

## 2021-11-04 NOTE — TELEPHONE ENCOUNTER
MEDICATION PRIOR AUTHORIZATION NEEDED:    1. Name of Medication: Jardiance 10mg    2. Requested By (Name of Pharmacy): [facility] pharmacy.   [id number] [address]     3. Is insurance on file current? Yes    4. What is the name & phone number of the 3rd party payor? N/A

## 2021-11-04 NOTE — TELEPHONE ENCOUNTER
FINAL PRIOR AUTHORIZATION STATUS:    1.  Name of Medication & Dose: Jardiance 10mg      2. Prior Auth Status: Approved through 11/4/2021 - 12/13/2099     3. Action Taken: Pharmacy Notified: yes Patient Notified: yes    Please see scanned documents for additional information and approval receipt.

## 2022-04-11 SDOH — ECONOMIC STABILITY: HOUSING INSECURITY
IN THE LAST 12 MONTHS, WAS THERE A TIME WHEN YOU DID NOT HAVE A STEADY PLACE TO SLEEP OR SLEPT IN A SHELTER (INCLUDING NOW)?: NO

## 2022-04-11 SDOH — ECONOMIC STABILITY: TRANSPORTATION INSECURITY
IN THE PAST 12 MONTHS, HAS THE LACK OF TRANSPORTATION KEPT YOU FROM MEDICAL APPOINTMENTS OR FROM GETTING MEDICATIONS?: NO

## 2022-04-11 SDOH — HEALTH STABILITY: MENTAL HEALTH
STRESS IS WHEN SOMEONE FEELS TENSE, NERVOUS, ANXIOUS, OR CAN'T SLEEP AT NIGHT BECAUSE THEIR MIND IS TROUBLED. HOW STRESSED ARE YOU?: TO SOME EXTENT

## 2022-04-11 SDOH — ECONOMIC STABILITY: HOUSING INSECURITY: IN THE LAST 12 MONTHS, HOW MANY PLACES HAVE YOU LIVED?: 1

## 2022-04-11 SDOH — ECONOMIC STABILITY: INCOME INSECURITY: HOW HARD IS IT FOR YOU TO PAY FOR THE VERY BASICS LIKE FOOD, HOUSING, MEDICAL CARE, AND HEATING?: NOT HARD AT ALL

## 2022-04-11 SDOH — ECONOMIC STABILITY: FOOD INSECURITY: WITHIN THE PAST 12 MONTHS, YOU WORRIED THAT YOUR FOOD WOULD RUN OUT BEFORE YOU GOT MONEY TO BUY MORE.: NEVER TRUE

## 2022-04-11 SDOH — HEALTH STABILITY: PHYSICAL HEALTH: ON AVERAGE, HOW MANY DAYS PER WEEK DO YOU ENGAGE IN MODERATE TO STRENUOUS EXERCISE (LIKE A BRISK WALK)?: 5 DAYS

## 2022-04-11 SDOH — ECONOMIC STABILITY: FOOD INSECURITY: WITHIN THE PAST 12 MONTHS, THE FOOD YOU BOUGHT JUST DIDN'T LAST AND YOU DIDN'T HAVE MONEY TO GET MORE.: NEVER TRUE

## 2022-04-11 SDOH — ECONOMIC STABILITY: INCOME INSECURITY: IN THE LAST 12 MONTHS, WAS THERE A TIME WHEN YOU WERE NOT ABLE TO PAY THE MORTGAGE OR RENT ON TIME?: NO

## 2022-04-11 SDOH — HEALTH STABILITY: PHYSICAL HEALTH: ON AVERAGE, HOW MANY MINUTES DO YOU ENGAGE IN EXERCISE AT THIS LEVEL?: 150+ MIN

## 2022-04-11 SDOH — ECONOMIC STABILITY: TRANSPORTATION INSECURITY
IN THE PAST 12 MONTHS, HAS LACK OF RELIABLE TRANSPORTATION KEPT YOU FROM MEDICAL APPOINTMENTS, MEETINGS, WORK OR FROM GETTING THINGS NEEDED FOR DAILY LIVING?: NO

## 2022-04-11 SDOH — ECONOMIC STABILITY: TRANSPORTATION INSECURITY
IN THE PAST 12 MONTHS, HAS LACK OF TRANSPORTATION KEPT YOU FROM MEETINGS, WORK, OR FROM GETTING THINGS NEEDED FOR DAILY LIVING?: NO

## 2022-04-11 ASSESSMENT — SOCIAL DETERMINANTS OF HEALTH (SDOH)
HOW OFTEN DO YOU HAVE SIX OR MORE DRINKS ON ONE OCCASION: WEEKLY
WITHIN THE PAST 12 MONTHS, YOU WORRIED THAT YOUR FOOD WOULD RUN OUT BEFORE YOU GOT THE MONEY TO BUY MORE: NEVER TRUE
HOW OFTEN DO YOU ATTENT MEETINGS OF THE CLUB OR ORGANIZATION YOU BELONG TO?: NEVER
HOW OFTEN DO YOU ATTEND CHURCH OR RELIGIOUS SERVICES?: NEVER
HOW MANY DRINKS CONTAINING ALCOHOL DO YOU HAVE ON A TYPICAL DAY WHEN YOU ARE DRINKING: 7 TO 9
DO YOU BELONG TO ANY CLUBS OR ORGANIZATIONS SUCH AS CHURCH GROUPS UNIONS, FRATERNAL OR ATHLETIC GROUPS, OR SCHOOL GROUPS?: NO
HOW OFTEN DO YOU GET TOGETHER WITH FRIENDS OR RELATIVES?: ONCE A WEEK
HOW OFTEN DO YOU ATTENT MEETINGS OF THE CLUB OR ORGANIZATION YOU BELONG TO?: NEVER
HOW OFTEN DO YOU HAVE A DRINK CONTAINING ALCOHOL: 2-4 TIMES A MONTH
HOW HARD IS IT FOR YOU TO PAY FOR THE VERY BASICS LIKE FOOD, HOUSING, MEDICAL CARE, AND HEATING?: NOT HARD AT ALL
HOW OFTEN DO YOU ATTEND CHURCH OR RELIGIOUS SERVICES?: NEVER
IN A TYPICAL WEEK, HOW MANY TIMES DO YOU TALK ON THE PHONE WITH FAMILY, FRIENDS, OR NEIGHBORS?: ONCE A WEEK
IN A TYPICAL WEEK, HOW MANY TIMES DO YOU TALK ON THE PHONE WITH FAMILY, FRIENDS, OR NEIGHBORS?: ONCE A WEEK
HOW OFTEN DO YOU GET TOGETHER WITH FRIENDS OR RELATIVES?: ONCE A WEEK
DO YOU BELONG TO ANY CLUBS OR ORGANIZATIONS SUCH AS CHURCH GROUPS UNIONS, FRATERNAL OR ATHLETIC GROUPS, OR SCHOOL GROUPS?: NO

## 2022-04-11 ASSESSMENT — LIFESTYLE VARIABLES
HOW OFTEN DO YOU HAVE A DRINK CONTAINING ALCOHOL: 2-4 TIMES A MONTH
HOW MANY STANDARD DRINKS CONTAINING ALCOHOL DO YOU HAVE ON A TYPICAL DAY: 7 TO 9
HOW OFTEN DO YOU HAVE SIX OR MORE DRINKS ON ONE OCCASION: WEEKLY

## 2022-04-14 ENCOUNTER — OFFICE VISIT (OUTPATIENT)
Dept: MEDICAL GROUP | Facility: PHYSICIAN GROUP | Age: 62
End: 2022-04-14
Payer: COMMERCIAL

## 2022-04-14 VITALS
HEART RATE: 86 BPM | TEMPERATURE: 97.4 F | DIASTOLIC BLOOD PRESSURE: 80 MMHG | SYSTOLIC BLOOD PRESSURE: 108 MMHG | OXYGEN SATURATION: 94 % | HEIGHT: 75 IN | WEIGHT: 230 LBS | BODY MASS INDEX: 28.6 KG/M2

## 2022-04-14 DIAGNOSIS — E78.2 MIXED HYPERLIPIDEMIA: ICD-10-CM

## 2022-04-14 DIAGNOSIS — Z87.898 HISTORY OF NOCTURIA: ICD-10-CM

## 2022-04-14 DIAGNOSIS — I10 ESSENTIAL HYPERTENSION: ICD-10-CM

## 2022-04-14 DIAGNOSIS — E11.8 TYPE II DIABETES MELLITUS WITH COMPLICATION (HCC): ICD-10-CM

## 2022-04-14 PROBLEM — L60.8 TOENAIL DEFORMITY: Status: ACTIVE | Noted: 2022-04-14

## 2022-04-14 LAB
HBA1C MFR BLD: 6.2 % (ref 0–5.6)
INT CON NEG: ABNORMAL
INT CON POS: ABNORMAL

## 2022-04-14 PROCEDURE — 99214 OFFICE O/P EST MOD 30 MIN: CPT | Performed by: NURSE PRACTITIONER

## 2022-04-14 PROCEDURE — 83036 HEMOGLOBIN GLYCOSYLATED A1C: CPT | Performed by: NURSE PRACTITIONER

## 2022-04-14 RX ORDER — LISINOPRIL 20 MG/1
20 TABLET ORAL 2 TIMES DAILY
COMMUNITY
End: 2022-04-14

## 2022-04-14 RX ORDER — EMPAGLIFLOZIN 10 MG/1
10 TABLET, FILM COATED ORAL DAILY
Qty: 90 TABLET | Refills: 4 | Status: SHIPPED | OUTPATIENT
Start: 2022-04-14 | End: 2023-08-10 | Stop reason: SDUPTHER

## 2022-04-14 RX ORDER — LISINOPRIL 10 MG/1
10 TABLET ORAL 2 TIMES DAILY
Qty: 180 TABLET | Refills: 3 | Status: SHIPPED | OUTPATIENT
Start: 2022-04-14 | End: 2023-06-01

## 2022-04-14 ASSESSMENT — ENCOUNTER SYMPTOMS
GASTROINTESTINAL NEGATIVE: 1
COUGH: 0
FEVER: 0
EYES NEGATIVE: 1
PSYCHIATRIC NEGATIVE: 1
CONSTITUTIONAL NEGATIVE: 1
MUSCULOSKELETAL NEGATIVE: 1
NEUROLOGICAL NEGATIVE: 1
SHORTNESS OF BREATH: 0
SPUTUM PRODUCTION: 0
PALPITATIONS: 0

## 2022-04-14 ASSESSMENT — PATIENT HEALTH QUESTIONNAIRE - PHQ9: CLINICAL INTERPRETATION OF PHQ2 SCORE: 0

## 2022-04-14 ASSESSMENT — FIBROSIS 4 INDEX: FIB4 SCORE: 1.15

## 2022-04-14 NOTE — ASSESSMENT & PLAN NOTE
LDL <100; Denies myalgia; no CP, dizziness, palpitations reported  Continue plan per cardiology  - Rosuvastatin 40mg QD, Zetia 10mg QD

## 2022-04-14 NOTE — ASSESSMENT & PLAN NOTE
A1C 6.2 today (improved from 6.6)  - continue Jardiance 10mg QD; this was his first medication with elevated A1C (no metformin), he has history of stent placement and Jardiance was selected for cardiovascular benefits, he is okay with cost of medication.  - he understands role of sugar intake in improving A1C and is working on this  - recheck A1C at next visit in 6 months

## 2022-04-14 NOTE — ASSESSMENT & PLAN NOTE
BP in clinic today 108/80, HR 86; no CP, dizziness, palpitations reported today  Continue plan per cardiology  - Lisinopril 10mg BID, Dilitiazem 10mg QD

## 2022-04-14 NOTE — LETTER
[place]  LEIGH GuevaraP.  [address]  [place] [address]  Fax: [phone]   Authorization for Release/Disclosure of   Protected Health Information   Name: YVES IVORY : 1960 SSN: [id number]   Address: [facility] [place] [address] Phone:    [phone] (home)    I authorize the entity listed below to release/disclose the PHI below to:   [employer] [facility]/Stormy Irene D.N.P. and Stormy Irene D.N.P.   Provider or Entity Name:  [facility]   Address   City, State, [facility]   Phone:  [phone]    Fax:     Reason for request: continuity of care   Information to be released:    [  ] LAST COLONOSCOPY,  including any PATH REPORT and follow-up  [  ] LAST FIT/COLOGUARD RESULT [  ] LAST DEXA  [  ] LAST MAMMOGRAM  [  ] LAST PAP  [  ] LAST LABS [  ] RETINA EXAM REPORT  [  ] IMMUNIZATION RECORDS  [  ] Release all info      [  ] Check here and initial the line next to each item to release ALL health information INCLUDING  _____ Care and treatment for drug and / or alcohol abuse  _____ HIV testing, infection status, or AIDS  _____ Genetic Testing    DATES OF SERVICE OR TIME PERIOD TO BE DISCLOSED: _____________  I understand and acknowledge that:  * This Authorization may be revoked at any time by you in writing, except if your health information has already been used or disclosed.  * Your health information that will be used or disclosed as a result of you signing this authorization could be re-disclosed by the recipient. If this occurs, your re-disclosed health information may no longer be protected by State or Federal laws.  * You may refuse to sign this Authorization. Your refusal will not affect your ability to obtain treatment.  * This Authorization becomes effective upon signing and will  on (date) __________.      If no date is indicated, this Authorization will  one (1) year from the signature date.    Name: Yves Ivory    Signature:   Date:      4/14/2022       PLEASE FAX REQUESTED RECORDS BACK TO: [phone]

## 2022-04-14 NOTE — PROGRESS NOTES
Subjective:     CC:    Chief Complaint   Patient presents with   • Establish Care        HISTORY OF THE PRESENT ILLNESS: Patient is a 61 y.o. male, here today to establish care. Prior PCP was Dr De La Torre. The below problems were discussed/reviewed at this visit:    Problem   History of Nocturia    Reports was started on Flomax last year by prior PCP for increased night time urinary urgency. His lisinopril was also increased for better BP management; states he stopped taking flomax because he noticed it dropped his BP too low. He has noticed some improvement in nocturia since increasing lisinopril.   He will let me know if this becomes a problem for him again and we will revisit treatment.     Toenail Deformity    Left toenail #1- thick and rippled; some discoloration noted on the corners of the nail  He is applying OTC topical antifungal treatment; we discussed washing socks daily, airing out shoes; he declined podiatry consult for now and will let me know if nail gets worse     Essential Hypertension    Followed by cardiology; Hx MI with stents  Taking Lisinopril 10mg BID, Dilitiazem 10mg QD     Adalid (Obstructive Sleep Apnea)    Reports did not tolerate Bipap at home; he is using nasal apfrin & breath rite strips nightly with relief     Type II Diabetes Mellitus With Complication (Hcc)    Taking Jardiance 10mg QD    - GFR >60  - retinal screen done 8/2021 at the [facility] (will get records)  - microalbumin ordered today  - monofilament done today (normal)     Coronary Artery Disease Involving Native Coronary Artery of Native Heart Without Angina Pectoris    S/p stent placement in the past; Followed by cardiology   On diltiazem, lisinopril, ASA, rosuvastatin/zetia     Hld (Hyperlipidemia)    Followed by cardiology; hx MI with stents  Taking Rosuvastatin 40mg QD, Zetia 10mg QD  - states had elevated cholesterol on Lipitor in the past       Current Outpatient Medications Ordered in Epic   Medication Sig Dispense Refill   •  "lisinopril (PRINIVIL) 10 MG Tab Take 1 Tablet by mouth 2 times a day. 1 tab in the morning and 1 tab at night 180 Tablet 3   • Empagliflozin (JARDIANCE) 10 MG Tab Take 10 mg by mouth every day. 90 Tablet 4   • DILTIAZem CD (CARDIZEM CD) 120 MG CAPSULE SR 24 HR TAKE 1 CAPSULE BY MOUTH EVERY DAY 90 Capsule 3   • rosuvastatin (CRESTOR) 40 MG tablet Take 1 Tablet by mouth every day. 90 Tablet 3   • ezetimibe (ZETIA) 10 MG Tab Take 1 Tablet by mouth every day. 90 Tablet 3   • Calcium Carbonate Antacid (SHAMA-SELTZER ANTACID PO) Take  by mouth.     • Phenylephrine HCl (AFRIN ALLERGY NA) [place]  in nose.     • aspirin EC 81 MG EC tablet Take 1 Tab by mouth every day. 30 Tab 0     No current Epic-ordered facility-administered medications on file.        Past Surgical History:   Procedure Laterality Date   • ANGIOPLASTY     • APPENDECTOMY CHILD     • TONSILLECTOMY     • ZZZ CARDIAC CATH          Allergies:  Brilinta [ticagrelor]    Health Maintenance: Completed    ROS:   Review of Systems   Constitutional: Negative.  Negative for fever and malaise/fatigue.   HENT: Negative.    Eyes: Negative.    Respiratory: Negative for cough, sputum production and shortness of breath.    Cardiovascular: Negative for chest pain, palpitations and leg swelling.   Gastrointestinal: Negative.    Genitourinary: Negative.    Musculoskeletal: Negative.    Neurological: Negative.    Endo/Heme/Allergies: Negative.    Psychiatric/Behavioral: Negative.        Objective:     Exam: /80 (BP Location: Left arm, Patient Position: Sitting, BP Cuff Size: Adult)   Pulse 86   Temp 36.3 °C (97.4 °F) (Temporal)   Ht 1.895 m (6' 2.61\")   Wt 104 kg (230 lb)   SpO2 94%  Body mass index is 29.05 kg/m².    Physical Exam  Constitutional:       Appearance: Normal appearance.   Cardiovascular:      Rate and Rhythm: Normal rate and regular rhythm.      Pulses: Normal pulses.           Dorsalis pedis pulses are 2+ on the right side and 2+ on the left side.      " Heart sounds: Normal heart sounds.   Pulmonary:      Effort: Pulmonary effort is normal.      Breath sounds: Normal breath sounds.   Musculoskeletal:      Right lower leg: No edema.      Left lower leg: No edema.   Feet:      Right foot:      Protective Sensation: 4 sites tested. 4 sites sensed.      Skin integrity: Skin integrity normal.      Left foot:      Protective Sensation: 4 sites tested. 4 sites sensed.      Skin integrity: Skin integrity normal.      Toenail Condition: Left toenails are abnormally thick. Fungal disease present.     Comments: Left Toe# 1- thick, rippled toe nail  Neurological:      Mental Status: He is alert.       Labs: reviewed from 2021    Assessment & Plan:   61 y.o. male with the following -    Problem List Items Addressed This Visit     HLD (hyperlipidemia)     LDL <100; Denies myalgia; no CP, dizziness, palpitations reported  Continue plan per cardiology  - Rosuvastatin 40mg QD, Zetia 10mg QD         Relevant Medications    lisinopril (PRINIVIL) 10 MG Tab    Type II diabetes mellitus with complication (HCC)     A1C 6.2 today (improved from 6.6)  - continue Jardiance 10mg QD; this was his first medication with elevated A1C (no metformin), he has history of stent placement and Jardiance was selected for cardiovascular benefits, he is okay with cost of medication.  - he understands role of sugar intake in improving A1C and is working on this  - recheck A1C at next visit in 6 months         Relevant Medications    Empagliflozin (JARDIANCE) 10 MG Tab    Other Relevant Orders    POCT  A1C (Completed)    Comp Metabolic Panel    Lipid Profile    CBC WITHOUT DIFFERENTIAL    Diabetic Monofilament LE Exam (Completed)    MICROALBUMIN CREAT RATIO URINE    Essential hypertension     BP in clinic today 108/80, HR 86; no CP, dizziness, palpitations reported today  Continue plan per cardiology  - Lisinopril 10mg BID, Dilitiazem 10mg QD         Relevant Medications    lisinopril (PRINIVIL) 10 MG Tab     Other Relevant Orders    Comp Metabolic Panel    Lipid Profile    CBC WITHOUT DIFFERENTIAL    History of nocturia        Educated in proper administration of medication(s) ordered today including safety, possible SE, risks, benefits, rationale and alternatives to therapy.     Return in about 6 months (around 10/14/2022) for DM2.    Please note that this dictation was created using voice recognition software. I have made every reasonable attempt to correct obvious errors, but I expect that there are errors of grammar and possibly content that I did not discover before finalizing the note.

## 2022-06-01 ENCOUNTER — HOSPITAL ENCOUNTER (OUTPATIENT)
Dept: LAB | Facility: MEDICAL CENTER | Age: 62
End: 2022-06-01
Attending: NURSE PRACTITIONER
Payer: COMMERCIAL

## 2022-06-01 DIAGNOSIS — I10 ESSENTIAL HYPERTENSION: ICD-10-CM

## 2022-06-01 DIAGNOSIS — E11.8 TYPE II DIABETES MELLITUS WITH COMPLICATION (HCC): ICD-10-CM

## 2022-06-01 LAB
ALBUMIN SERPL BCP-MCNC: 4.7 G/DL (ref 3.2–4.9)
ALBUMIN/GLOB SERPL: 2 G/DL
ALP SERPL-CCNC: 69 U/L (ref 30–99)
ALT SERPL-CCNC: 44 U/L (ref 2–50)
ANION GAP SERPL CALC-SCNC: 12 MMOL/L (ref 7–16)
AST SERPL-CCNC: 28 U/L (ref 12–45)
BILIRUB SERPL-MCNC: 0.7 MG/DL (ref 0.1–1.5)
BUN SERPL-MCNC: 20 MG/DL (ref 8–22)
CALCIUM SERPL-MCNC: 9.6 MG/DL (ref 8.5–10.5)
CHLORIDE SERPL-SCNC: 105 MMOL/L (ref 96–112)
CHOLEST SERPL-MCNC: 126 MG/DL (ref 100–199)
CO2 SERPL-SCNC: 23 MMOL/L (ref 20–33)
CREAT SERPL-MCNC: 0.79 MG/DL (ref 0.5–1.4)
CREAT UR-MCNC: 76.36 MG/DL
ERYTHROCYTE [DISTWIDTH] IN BLOOD BY AUTOMATED COUNT: 41.3 FL (ref 35.9–50)
FASTING STATUS PATIENT QL REPORTED: NORMAL
GFR SERPLBLD CREATININE-BSD FMLA CKD-EPI: 102 ML/MIN/1.73 M 2
GLOBULIN SER CALC-MCNC: 2.4 G/DL (ref 1.9–3.5)
GLUCOSE SERPL-MCNC: 112 MG/DL (ref 65–99)
HCT VFR BLD AUTO: 52.5 % (ref 42–52)
HDLC SERPL-MCNC: 40 MG/DL
HGB BLD-MCNC: 17.6 G/DL (ref 14–18)
LDLC SERPL CALC-MCNC: 61 MG/DL
MCH RBC QN AUTO: 30.2 PG (ref 27–33)
MCHC RBC AUTO-ENTMCNC: 33.5 G/DL (ref 33.7–35.3)
MCV RBC AUTO: 90.2 FL (ref 81.4–97.8)
MICROALBUMIN UR-MCNC: 1.8 MG/DL
MICROALBUMIN/CREAT UR: 24 MG/G (ref 0–30)
PLATELET # BLD AUTO: 251 K/UL (ref 164–446)
PMV BLD AUTO: 9.6 FL (ref 9–12.9)
POTASSIUM SERPL-SCNC: 4.6 MMOL/L (ref 3.6–5.5)
PROT SERPL-MCNC: 7.1 G/DL (ref 6–8.2)
RBC # BLD AUTO: 5.82 M/UL (ref 4.7–6.1)
SODIUM SERPL-SCNC: 140 MMOL/L (ref 135–145)
TRIGL SERPL-MCNC: 126 MG/DL (ref 0–149)
WBC # BLD AUTO: 6.6 K/UL (ref 4.8–10.8)

## 2022-06-01 PROCEDURE — 82043 UR ALBUMIN QUANTITATIVE: CPT

## 2022-06-01 PROCEDURE — 80053 COMPREHEN METABOLIC PANEL: CPT

## 2022-06-01 PROCEDURE — 82570 ASSAY OF URINE CREATININE: CPT

## 2022-06-01 PROCEDURE — 80061 LIPID PANEL: CPT

## 2022-06-01 PROCEDURE — 36415 COLL VENOUS BLD VENIPUNCTURE: CPT

## 2022-06-01 PROCEDURE — 85027 COMPLETE CBC AUTOMATED: CPT

## 2022-08-07 ENCOUNTER — OFFICE VISIT (OUTPATIENT)
Dept: URGENT CARE | Facility: PHYSICIAN GROUP | Age: 62
End: 2022-08-07
Payer: COMMERCIAL

## 2022-08-07 VITALS
HEART RATE: 100 BPM | RESPIRATION RATE: 16 BRPM | HEIGHT: 71 IN | SYSTOLIC BLOOD PRESSURE: 116 MMHG | DIASTOLIC BLOOD PRESSURE: 78 MMHG | BODY MASS INDEX: 32.2 KG/M2 | TEMPERATURE: 97.7 F | WEIGHT: 230 LBS | OXYGEN SATURATION: 92 %

## 2022-08-07 DIAGNOSIS — E78.2 MIXED HYPERLIPIDEMIA: ICD-10-CM

## 2022-08-07 DIAGNOSIS — I25.10 CORONARY ARTERY DISEASE INVOLVING NATIVE CORONARY ARTERY OF NATIVE HEART WITHOUT ANGINA PECTORIS: ICD-10-CM

## 2022-08-07 DIAGNOSIS — J02.0 PHARYNGITIS DUE TO STREPTOCOCCUS SPECIES: ICD-10-CM

## 2022-08-07 DIAGNOSIS — J02.9 SORE THROAT: ICD-10-CM

## 2022-08-07 LAB
FLUAV+FLUBV AG SPEC QL IA: NEGATIVE
INT CON NEG: NORMAL
INT CON NEG: NORMAL
INT CON POS: NORMAL
INT CON POS: NORMAL
S PYO AG THROAT QL: POSITIVE

## 2022-08-07 PROCEDURE — 87880 STREP A ASSAY W/OPTIC: CPT | Performed by: PHYSICIAN ASSISTANT

## 2022-08-07 PROCEDURE — 87804 INFLUENZA ASSAY W/OPTIC: CPT | Performed by: PHYSICIAN ASSISTANT

## 2022-08-07 PROCEDURE — 99213 OFFICE O/P EST LOW 20 MIN: CPT | Performed by: PHYSICIAN ASSISTANT

## 2022-08-07 RX ORDER — AMOXICILLIN 500 MG/1
500 CAPSULE ORAL 2 TIMES DAILY
Qty: 20 CAPSULE | Refills: 0 | Status: SHIPPED | OUTPATIENT
Start: 2022-08-07 | End: 2022-08-17

## 2022-08-07 ASSESSMENT — ENCOUNTER SYMPTOMS
VOMITING: 0
EYE PAIN: 0
ABDOMINAL PAIN: 0
FEVER: 1
SWOLLEN GLANDS: 1
NAUSEA: 0
PALPITATIONS: 0
CHILLS: 1
SINUS PAIN: 0
SORE THROAT: 1
MYALGIAS: 1
COUGH: 1
SHORTNESS OF BREATH: 0
DIZZINESS: 0
HEADACHES: 1
DIARRHEA: 0
SPUTUM PRODUCTION: 1
BLURRED VISION: 0

## 2022-08-07 ASSESSMENT — FIBROSIS 4 INDEX: FIB4 SCORE: 1.01

## 2022-08-07 NOTE — PROGRESS NOTES
Subjective     Jose J Ivory is a 62 y.o. male who presents with Pharyngitis (Fever, congestion, cough x1 day )    URI   This is a new problem. The current episode started yesterday. The problem has been unchanged. Maximum temperature: subjective fever. The fever has been present for less than 1 day. Associated symptoms include congestion, coughing, headaches, a sore throat and swollen glands. Pertinent negatives include no abdominal pain, chest pain, diarrhea, ear pain, nausea, plugged ear sensation, rash, sinus pain or vomiting. He has tried acetaminophen and decongestant for the symptoms. The treatment provided mild relief.       Review of Systems   Constitutional: Positive for chills, fever (subjective) and malaise/fatigue.   HENT: Positive for congestion and sore throat. Negative for ear pain and sinus pain.    Eyes: Negative for blurred vision and pain.   Respiratory: Positive for cough and sputum production. Negative for shortness of breath.    Cardiovascular: Negative for chest pain and palpitations.   Gastrointestinal: Negative for abdominal pain, diarrhea, nausea and vomiting.   Musculoskeletal: Positive for myalgias.   Skin: Negative for rash.   Neurological: Positive for headaches. Negative for dizziness.         PMH:  has a past medical history of Anxiety, Bronchitis, CAD (coronary artery disease), Chickenpox, Coronary heart disease, Depression, [place] measles, Hyperlipidemia, Hypertension, Influenza, Obesity, Sleep apnea, Ventricular tachycardia (HCC), and Whooping cough.  MEDS:   Current Outpatient Medications:   •  lisinopril (PRINIVIL) 10 MG Tab, Take 1 Tablet by mouth 2 times a day. 1 tab in the morning and 1 tab at night, Disp: 180 Tablet, Rfl: 3  •  Empagliflozin (JARDIANCE) 10 MG Tab, Take 10 mg by mouth every day., Disp: 90 Tablet, Rfl: 4  •  DILTIAZem CD (CARDIZEM CD) 120 MG CAPSULE SR 24 HR, TAKE 1 CAPSULE BY MOUTH EVERY DAY, Disp: 90 Capsule, Rfl: 3  •  rosuvastatin (CRESTOR) 40 MG  "tablet, Take 1 Tablet by mouth every day., Disp: 90 Tablet, Rfl: 3  •  ezetimibe (ZETIA) 10 MG Tab, Take 1 Tablet by mouth every day., Disp: 90 Tablet, Rfl: 3  •  Calcium Carbonate Antacid (SHAMA-SELTZER ANTACID PO), Take  by mouth., Disp: , Rfl:   •  Phenylephrine HCl (AFRIN ALLERGY NA), Spray  in nose., Disp: , Rfl:   •  aspirin EC 81 MG EC tablet, Take 1 Tab by mouth every day., Disp: 30 Tab, Rfl: 0  ALLERGIES:   Allergies   Allergen Reactions   • Brilinta [Ticagrelor] Shortness of Breath     Med put patient in ER twice     SURGHX:   Past Surgical History:   Procedure Laterality Date   • ANGIOPLASTY     • APPENDECTOMY CHILD     • TONSILLECTOMY     • ZZZ CARDIAC CATH       SOCHX:  reports that he has been smoking cigars. He has a 40.00 pack-year smoking history. He quit smokeless tobacco use about 6 years ago.  His smokeless tobacco use included chew. He reports current alcohol use. He reports previous drug use. Drug: Marijuana.  FH: Family history was reviewed, no pertinent findings to report        Objective     /78   Pulse 100   Temp 36.5 °C (97.7 °F) (Temporal)   Resp 16   Ht 1.803 m (5' 11\")   Wt 104 kg (230 lb)   SpO2 92%   BMI 32.08 kg/m²      Physical Exam  Constitutional:       Appearance: He is well-developed.   HENT:      Head: Normocephalic and atraumatic.      Right Ear: Tympanic membrane, ear canal and external ear normal.      Left Ear: Tympanic membrane, ear canal and external ear normal.      Nose: Congestion present. No mucosal edema or rhinorrhea.      Mouth/Throat:      Lips: Pink.      Mouth: Mucous membranes are moist.      Pharynx: Uvula midline. Posterior oropharyngeal erythema present. No oropharyngeal exudate or uvula swelling.   Eyes:      Conjunctiva/sclera: Conjunctivae normal.      Pupils: Pupils are equal, round, and reactive to light.   Cardiovascular:      Rate and Rhythm: Normal rate and regular rhythm.      Heart sounds: Normal heart sounds. No murmur " heard.  Pulmonary:      Effort: Pulmonary effort is normal.      Breath sounds: Normal breath sounds. No decreased breath sounds, wheezing, rhonchi or rales.   Musculoskeletal:      Cervical back: Normal range of motion.   Lymphadenopathy:      Cervical: Cervical adenopathy present.   Skin:     General: Skin is warm and dry.      Capillary Refill: Capillary refill takes less than 2 seconds.   Neurological:      Mental Status: He is alert and oriented to person, place, and time.   Psychiatric:         Behavior: Behavior normal.         Judgment: Judgment normal.       POCT Rapid Strep A - POSITIVE    POCT Influenza A/B - Negative    Assessment & Plan     1. Sore throat  - POCT Rapid Strep A  - POCT Influenza A/B    2. Pharyngitis due to Streptococcus species  - amoxicillin (AMOXIL) 500 MG Cap; Take 1 Capsule by mouth 2 times a day for 10 days.  Dispense: 20 Capsule; Refill: 0  -Supportive care discussed to include salt water gargles, throat lozenges, and increased fluid intake  Discussed with patient that he is contagious until he has been on antibiotics for least 24 hours.  Also recommend that he switch his toothbrush after being on the antibiotics for 2 to 3 days.  Did recommend that if symptoms do not drastically improved within 48 hours that he do an at home rapid COVID test just to be safe.      Differential Diagnosis, natural history, and supportive care discussed. Return to the Urgent Care or follow up with your PCP if symptoms fail to resolve, or for any new or worsening symptoms. Emergency room precautions discussed. Patient and/or family appears understanding of information.

## 2022-08-08 RX ORDER — DILTIAZEM HYDROCHLORIDE 120 MG/1
CAPSULE, COATED, EXTENDED RELEASE ORAL
Qty: 90 CAPSULE | Refills: 0 | Status: SHIPPED | OUTPATIENT
Start: 2022-08-08 | End: 2022-12-08

## 2022-08-08 RX ORDER — EZETIMIBE 10 MG/1
10 TABLET ORAL DAILY
Qty: 90 TABLET | Refills: 0 | Status: SHIPPED | OUTPATIENT
Start: 2022-08-08 | End: 2022-11-07

## 2022-08-08 RX ORDER — ROSUVASTATIN CALCIUM 40 MG/1
40 TABLET, COATED ORAL DAILY
Qty: 90 TABLET | Refills: 0 | Status: SHIPPED | OUTPATIENT
Start: 2022-08-08 | End: 2022-11-11

## 2022-08-08 NOTE — TELEPHONE ENCOUNTER
Is the patient due for a refill? Yes    Was the patient seen the past year? Yes    Date of last office visit: 9/01/21    Does the patient have an upcoming appointment?  Yes   If yes, When? 9/28/22    Provider to refill:TW    Does the patients insurance require a 100 day supply?  No

## 2022-09-13 ENCOUNTER — OFFICE VISIT (OUTPATIENT)
Dept: CARDIOLOGY | Facility: MEDICAL CENTER | Age: 62
End: 2022-09-13
Payer: COMMERCIAL

## 2022-09-13 VITALS
HEIGHT: 71 IN | SYSTOLIC BLOOD PRESSURE: 120 MMHG | DIASTOLIC BLOOD PRESSURE: 88 MMHG | HEART RATE: 84 BPM | OXYGEN SATURATION: 95 % | WEIGHT: 232 LBS | BODY MASS INDEX: 32.48 KG/M2 | RESPIRATION RATE: 14 BRPM

## 2022-09-13 DIAGNOSIS — I10 ESSENTIAL HYPERTENSION: ICD-10-CM

## 2022-09-13 DIAGNOSIS — I25.10 CORONARY ARTERY DISEASE INVOLVING NATIVE CORONARY ARTERY OF NATIVE HEART WITHOUT ANGINA PECTORIS: ICD-10-CM

## 2022-09-13 DIAGNOSIS — E11.8 TYPE II DIABETES MELLITUS WITH COMPLICATION (HCC): ICD-10-CM

## 2022-09-13 DIAGNOSIS — E78.2 MIXED HYPERLIPIDEMIA: ICD-10-CM

## 2022-09-13 PROCEDURE — 99213 OFFICE O/P EST LOW 20 MIN: CPT | Performed by: INTERNAL MEDICINE

## 2022-09-13 ASSESSMENT — FIBROSIS 4 INDEX: FIB4 SCORE: 1.01

## 2022-09-13 NOTE — PROGRESS NOTES
Subjective:   Yves Ivory is a 61 y.o. male who presents today for follow-up of CAD hypertension and hyperlipidemia.    Hx of anxiety, HLD, obesity, HTN, anxiety, depression, smoking hx, and now CAD s/p STEMI with DENISSE to OM, VF arrest.    Since last visit he has lost weight and a job that is quite a bit more active blood pressures come under excellent control and his lipid profile is now excellent.  He has no symptoms and exerts himself regularly.    Past Medical History:   Diagnosis Date    Anxiety     Bronchitis     CAD (coronary artery disease)     STEMI-S/P DENISSE to OM    Chickenpox     Coronary heart disease     Depression     [place] measles     Hyperlipidemia     Hypertension     Influenza     Obesity     Sleep apnea     Ventricular tachycardia (HCC)     post STEMI     Whooping cough      Past Surgical History:   Procedure Laterality Date    ANGIOPLASTY      APPENDECTOMY CHILD      TONSILLECTOMY      [place]RA[place] CARDIAC CATH       Family History   Problem Relation Age of Onset    Cancer Mother         breast cancer    Heart Disease Father 38        heart transplant, 3 MI's, multiple bypass    Stroke Maternal Grandmother      Social History     Tobacco Use   Smoking Status Some Days    Packs/day: 1.00    Years: 40.00    Pack years: 40.00    Types: Cigars, Cigarettes    Last attempt to quit: 2016    Years since quittin.7   Smokeless Tobacco Former    Types: Chew    Quit date:    Tobacco Comments    1 - 2 cigars on the weeknds      Allergies   Allergen Reactions    Brilinta [Ticagrelor] Shortness of Breath     Med put patient in ER twice     Outpatient Encounter Medications as of 2022   Medication Sig Dispense Refill    ezetimibe (ZETIA) 10 MG Tab TAKE 1 TABLET BY MOUTH EVERY DAY 90 Tablet 0    DILTIAZem CD (CARDIZEM CD) 120 MG CAPSULE SR 24 HR TAKE 1 CAPSULE BY MOUTH EVERY DAY 90 Capsule 0    rosuvastatin (CRESTOR) 40 MG tablet TAKE 1 TABLET BY MOUTH EVERY DAY 90 Tablet 0    lisinopril (PRINIVIL) 10 MG  "Tab Take 1 Tablet by mouth 2 times a day. 1 tab in the morning and 1 tab at night 180 Tablet 3    Empagliflozin (JARDIANCE) 10 MG Tab Take 10 mg by mouth every day. 90 Tablet 4    Calcium Carbonate Antacid (SHAMA-SELTZER ANTACID PO) Take  by mouth.      Phenylephrine HCl (AFRIN ALLERGY NA) [place]  in nose.      aspirin EC 81 MG EC tablet Take 1 Tab by mouth every day. 30 Tab 0     No facility-administered encounter medications on file as of 9/13/2022.     Review of Systems   All other systems reviewed and are negative.     Objective:   /88 (BP Location: Left arm, Patient Position: Sitting)   Pulse 84   Resp 14   Ht 1.803 m (5' 11\")   Wt 105 kg (232 lb)   SpO2 95%   BMI 32.36 kg/m²     Physical Exam  Vitals reviewed.   Constitutional:       General: He is not in acute distress.     Appearance: He is well-developed. He is not diaphoretic.   HENT:      Head: Normocephalic and atraumatic.      Right Ear: External ear normal.      Left Ear: External ear normal.   Eyes:      General: No scleral icterus.        Right eye: No discharge.         Left eye: No discharge.      Conjunctiva/sclera: Conjunctivae normal.      Pupils: Pupils are equal, round, and reactive to light.   Neck:      Thyroid: No thyromegaly.      Vascular: No JVD.      Trachea: No tracheal deviation.   Cardiovascular:      Rate and Rhythm: Normal rate and regular rhythm.      Chest Wall: PMI is not displaced.      Pulses:           Carotid pulses are 2+ on the right side and 2+ on the left side.       Radial pulses are 2+ on the left side.        Popliteal pulses are 2+ on the right side and 2+ on the left side.        Dorsalis pedis pulses are 2+ on the right side and 2+ on the left side.        Posterior tibial pulses are 2+ on the right side and 2+ on the left side.      Heart sounds: No murmur heard.    No friction rub. No gallop.   Pulmonary:      Effort: Pulmonary effort is normal. No respiratory distress.      Breath sounds: Normal breath " sounds. No wheezing or rales.   Chest:      Chest wall: No tenderness.   Abdominal:      General: Bowel sounds are normal. There is no distension.      Palpations: Abdomen is soft.      Tenderness: There is no abdominal tenderness.   Musculoskeletal:         General: No tenderness or deformity. Normal range of motion.      Cervical back: Normal range of motion and neck supple.   Skin:     General: Skin is warm and dry.      Coloration: Skin is not pale.      Findings: No erythema or rash.   Neurological:      Mental Status: He is alert and oriented to person, place, and time.      Cranial Nerves: No cranial nerve deficit (cranial nerves II through XII grossly intact).      Coordination: Coordination normal.   Psychiatric:         Behavior: Behavior normal.         Thought Content: Thought content normal.     7/7/16 ANGIOGRAM POSTPROCEDURE DIAGNOSES:  1.  Coronary artery disease including occluded proximal obtuse marginal    branch, additional nonobstructive mid left anterior descending artery    stenosis.  2.  Successful thrombectomy/PTCA/stent placement of the proximal to mid obtuse   marginal branch with 3.0x32-mm Promus PREMIER drug-eluting stent.  3.  Angio-Seal closure.    7/8/16 ECHO CONCLUSIONS  Left ventricular ejection fraction is visually estimated to be 55%,   inferior hypokinesis.  Grade II-III diastolic dysfunction.  Mild concentric left ventricular   hypertrophy.  Dilated inferior vena cava with inspiratory collapse.  No significant valve disease or flow abnormalities.   No prior study is available for comparison.     Lab Results   Component Value Date/Time    WBC 6.6 06/01/2022 07:06 AM    RBC 5.82 06/01/2022 07:06 AM    HEMOGLOBIN 17.6 06/01/2022 07:06 AM    HEMATOCRIT 52.5 (H) 06/01/2022 07:06 AM    MCV 90.2 06/01/2022 07:06 AM    MCH 30.2 06/01/2022 07:06 AM    MCHC 33.5 (L) 06/01/2022 07:06 AM    MPV 9.6 06/01/2022 07:06 AM      Lab Results   Component Value Date/Time    CHOLSTRLTOT 126 06/01/2022  07:06 AM    LDL 61 06/01/2022 07:06 AM    HDL 40 06/01/2022 07:06 AM    TRIGLYCERIDE 126 06/01/2022 07:06 AM       Lab Results   Component Value Date/Time    SODIUM 140 06/01/2022 07:06 AM    POTASSIUM 4.6 06/01/2022 07:06 AM    CHLORIDE 105 06/01/2022 07:06 AM    CO2 23 06/01/2022 07:06 AM    GLUCOSE 112 (H) 06/01/2022 07:06 AM    BUN 20 06/01/2022 07:06 AM    CREATININE 0.79 06/01/2022 07:06 AM     Lab Results   Component Value Date/Time    ALKPHOSPHAT 69 06/01/2022 07:06 AM    ASTSGOT 28 06/01/2022 07:06 AM    ALTSGPT 44 06/01/2022 07:06 AM    TBILIRUBIN 0.7 06/01/2022 07:06 AM      Lab Results   Component Value Date/Time    BNPBTYPENAT 6 10/06/2016 02:20 PM      Assessment:     1. Coronary artery disease involving native coronary artery of native heart without angina pectoris        2. Type II diabetes mellitus with complication (HCC)        3. Essential hypertension        4. Mixed hyperlipidemia            Medical Decision Making:  Today's Assessment / Status / Plan:     Doing much better than last year.  Blood pressure is now controlled where as it was not previously, lipid profile is excellent with a switch to Crestor and Zetia and lifestyle interventions.  He is now much more physically active raise his heart rate several times per day at work and will break a sweat with his physical job.  Not exercising much outside of work and has no active hobbies and I suggested these are opportunities for improvement.  Continue current medical therapy with a goal LDL less than 70 and goal blood pressure less than 120/80.  Follow-up yearly with cardiology.

## 2022-10-12 ENCOUNTER — OFFICE VISIT (OUTPATIENT)
Dept: MEDICAL GROUP | Facility: PHYSICIAN GROUP | Age: 62
End: 2022-10-12
Payer: COMMERCIAL

## 2022-10-12 VITALS
OXYGEN SATURATION: 98 % | SYSTOLIC BLOOD PRESSURE: 108 MMHG | DIASTOLIC BLOOD PRESSURE: 68 MMHG | WEIGHT: 236 LBS | HEIGHT: 71 IN | BODY MASS INDEX: 33.04 KG/M2 | TEMPERATURE: 97 F | HEART RATE: 90 BPM

## 2022-10-12 DIAGNOSIS — R09.81 NASAL CONGESTION: ICD-10-CM

## 2022-10-12 DIAGNOSIS — E11.8 TYPE II DIABETES MELLITUS WITH COMPLICATION (HCC): ICD-10-CM

## 2022-10-12 DIAGNOSIS — E78.2 MIXED HYPERLIPIDEMIA: ICD-10-CM

## 2022-10-12 DIAGNOSIS — I10 ESSENTIAL HYPERTENSION: ICD-10-CM

## 2022-10-12 LAB
HBA1C MFR BLD: 6.3 % (ref 0–5.6)
INT CON NEG: ABNORMAL
INT CON POS: ABNORMAL

## 2022-10-12 PROCEDURE — 83036 HEMOGLOBIN GLYCOSYLATED A1C: CPT | Performed by: NURSE PRACTITIONER

## 2022-10-12 PROCEDURE — 99214 OFFICE O/P EST MOD 30 MIN: CPT | Performed by: NURSE PRACTITIONER

## 2022-10-12 ASSESSMENT — ENCOUNTER SYMPTOMS
FEVER: 0
NEUROLOGICAL NEGATIVE: 1
MUSCULOSKELETAL NEGATIVE: 1
SHORTNESS OF BREATH: 0
CONSTITUTIONAL NEGATIVE: 1
EYES NEGATIVE: 1
COUGH: 0
SPUTUM PRODUCTION: 0
PALPITATIONS: 0

## 2022-10-12 ASSESSMENT — FIBROSIS 4 INDEX: FIB4 SCORE: 1.01

## 2022-10-12 NOTE — ASSESSMENT & PLAN NOTE
A1C in clinic today 6.3 (up from 6.2)  - continue Jardiance 10mg QD; this was his first medication with elevated A1C (no metformin), he has history of stent placement and Jardiance was selected for cardiovascular benefits, he is okay with cost of medication.  - he understands role of sugar intake in improving A1C and is working on this  - recheck A1C at next visit in 6 months

## 2022-10-12 NOTE — PROGRESS NOTES
Subjective:     CC:   Chief Complaint   Patient presents with    Diabetes        HPI:   Patient is a 62 y.o. male here today for 6 month follow up. Active concern is nasal congestion.     Problem   Nasal Congestion    Reports having nasal congestion at night related to allergies for years. He also has hx of ROSALINO but unable to tolerate BIPAP mask. He has been using Afrin nasal spray nightly for some years now. He is concerned about long term use of nasal spray and would like to see allergist.      Essential Hypertension    Followed by cardiology; Hx MI with stents  Taking Lisinopril 10mg BID, Dilitiazem 10mg QD     Type II Diabetes Mellitus With Complication (Hcc)    Taking Jardiance 10mg QD    ; microalbum creat ratio 24  HTN managed on Lisinopril 10mg BID, Cardizem 120mg  Dyslipidemia managed on Rosuvastatin 40mg, Zetia 10mg   Normal monofilament 4/2022  [place] retinopathy; was told he is good for a few years. Declined screen today      Hld (Hyperlipidemia)    Followed by cardiology; hx MI with stents  Taking Rosuvastatin 40mg QD, Zetia 10mg QD  - states had elevated cholesterol on Lipitor in the past       Patient Active Problem List   Diagnosis    History of ST elevation myocardial infarction (STEMI)    Coronary artery disease involving native coronary artery of native heart without angina pectoris    HLD (hyperlipidemia)    VT (ventricular tachycardia)    Cigar smoker    Anxiety    Abdominal gas pain    Preventative health care    Obesity (BMI 30-39.9)    Cough    Type II diabetes mellitus with complication (HCC)    ROSALINO (obstructive sleep apnea)    Former smoker    Biliary colic    Vasculogenic erectile dysfunction    Essential hypertension    History of nocturia    Toenail deformity    Nasal congestion       Past Medical History:   Diagnosis Date    Anxiety     Bronchitis     CAD (coronary artery disease)     STEMI-S/P DENISSE to OM    Chickenpox     Coronary heart disease      "Depression     [place] measles     Hyperlipidemia     Hypertension     Influenza     Obesity     Sleep apnea     Ventricular tachycardia     post STEMI     Whooping cough         Past Surgical History:   Procedure Laterality Date    ANGIOPLASTY      APPENDECTOMY CHILD      TONSILLECTOMY      ZZZ CARDIAC CATH          Current Outpatient Medications on File Prior to Visit   Medication Sig Dispense Refill    Oxymetazoline HCl (AFRIN 12 HOUR NA) Administer  into affected nostril(S).      ezetimibe (ZETIA) 10 MG Tab TAKE 1 TABLET BY MOUTH EVERY DAY 90 Tablet 0    DILTIAZem CD (CARDIZEM CD) 120 MG CAPSULE SR 24 HR TAKE 1 CAPSULE BY MOUTH EVERY DAY 90 Capsule 0    rosuvastatin (CRESTOR) 40 MG tablet TAKE 1 TABLET BY MOUTH EVERY DAY 90 Tablet 0    lisinopril (PRINIVIL) 10 MG Tab Take 1 Tablet by mouth 2 times a day. 1 tab in the morning and 1 tab at night 180 Tablet 3    Empagliflozin (JARDIANCE) 10 MG Tab Take 10 mg by mouth every day. 90 Tablet 4    Calcium Carbonate Antacid (SHAMA-SELTZER ANTACID PO) Take  by mouth.      aspirin EC 81 MG EC tablet Take 1 Tab by mouth every day. 30 Tab 0     No current facility-administered medications on file prior to visit.        Health Maintenance: Completed    ROS:  Review of Systems   Constitutional: Negative.  Negative for fever and malaise/fatigue.   HENT:  Positive for congestion.    Eyes: Negative.    Respiratory:  Negative for cough, sputum production and shortness of breath.    Cardiovascular:  Negative for chest pain, palpitations and leg swelling.   Musculoskeletal: Negative.    Neurological: Negative.      Objective:     Exam:  /68   Pulse 90   Temp 36.1 °C (97 °F) (Temporal)   Ht 1.803 m (5' 11\")   Wt 107 kg (236 lb)   SpO2 98%   BMI 32.92 kg/m²  Body mass index is 32.92 kg/m².    Physical Exam  Constitutional:       Appearance: Normal appearance.   Cardiovascular:      Rate and Rhythm: Normal rate and regular rhythm.      Pulses: Normal pulses.      Heart " sounds: Normal heart sounds.   Pulmonary:      Effort: Pulmonary effort is normal.      Breath sounds: Normal breath sounds.   Musculoskeletal:      Right lower leg: No edema.      Left lower leg: No edema.   Neurological:      General: No focal deficit present.      Mental Status: He is alert and oriented to person, place, and time.     Assessment & Plan:     62 y.o. male with the following -     Problem List Items Addressed This Visit       HLD (hyperlipidemia)      Latest Reference Range & Units 6/1/22 07:06   Cholesterol,Tot 100 - 199 mg/dL 126   Triglycerides 0 - 149 mg/dL 126   HDL >=40 mg/dL 40   LDL <100 mg/dL 61   Denies myalgia; no CP, dizziness, palpitations reported  Continue plan per cardiology  - Rosuvastatin 40mg QD, Zetia 10mg QD         Type II diabetes mellitus with complication (HCC)     A1C in clinic today 6.3 (up from 6.2)  - continue Jardiance 10mg QD; this was his first medication with elevated A1C (no metformin), he has history of stent placement and Jardiance was selected for cardiovascular benefits, he is okay with cost of medication.  - he understands role of sugar intake in improving A1C and is working on this  - recheck A1C at next visit in 6 months         Relevant Orders    POCT  A1C    Essential hypertension     BP in clinic today 108/68, HR 86; no CP, dizziness, palpitations reported today  Continue plan per cardiology  - Lisinopril 10mg BID, Dilitiazem 10mg QD         Nasal congestion    Relevant Orders    Referral to Allergy     Return in about 6 months (around 4/12/2023).    Please note that this dictation was created using voice recognition software. I have made every reasonable attempt to correct obvious errors, but I expect that there are errors of grammar and possibly content that I did not discover before finalizing the note.

## 2022-10-12 NOTE — ASSESSMENT & PLAN NOTE
Latest Reference Range & Units 6/1/22 07:06   Cholesterol,Tot 100 - 199 mg/dL 126   Triglycerides 0 - 149 mg/dL 126   HDL >=40 mg/dL 40   LDL <100 mg/dL 61     Denies myalgia; no CP, dizziness, palpitations reported  Continue plan per cardiology  - Rosuvastatin 40mg QD, Zetia 10mg QD

## 2022-10-12 NOTE — ASSESSMENT & PLAN NOTE
BP in clinic today 108/68, HR 86; no CP, dizziness, palpitations reported today  Continue plan per cardiology  - Lisinopril 10mg BID, Dilitiazem 10mg QD

## 2022-11-05 DIAGNOSIS — E78.2 MIXED HYPERLIPIDEMIA: ICD-10-CM

## 2022-11-07 RX ORDER — EZETIMIBE 10 MG/1
10 TABLET ORAL DAILY
Qty: 90 TABLET | Refills: 3 | Status: SHIPPED | OUTPATIENT
Start: 2022-11-07 | End: 2023-07-18 | Stop reason: SDUPTHER

## 2022-11-07 NOTE — TELEPHONE ENCOUNTER
Is the patient due for a refill? Yes    Was the patient seen the past year? Yes    Date of last office visit: 9/13/22    Does the patient have an upcoming appointment?  No   If yes, When?     Provider to refill:TW    Does the patients insurance require a 100 day supply?  No

## 2023-03-09 ENCOUNTER — OFFICE VISIT (OUTPATIENT)
Dept: MEDICAL GROUP | Facility: PHYSICIAN GROUP | Age: 63
End: 2023-03-09
Payer: COMMERCIAL

## 2023-03-09 VITALS
HEIGHT: 71 IN | HEART RATE: 70 BPM | SYSTOLIC BLOOD PRESSURE: 124 MMHG | BODY MASS INDEX: 32.9 KG/M2 | OXYGEN SATURATION: 98 % | DIASTOLIC BLOOD PRESSURE: 76 MMHG | TEMPERATURE: 99 F | WEIGHT: 235 LBS

## 2023-03-09 DIAGNOSIS — J01.01 ACUTE RECURRENT MAXILLARY SINUSITIS: ICD-10-CM

## 2023-03-09 PROCEDURE — 99213 OFFICE O/P EST LOW 20 MIN: CPT | Performed by: NURSE PRACTITIONER

## 2023-03-09 RX ORDER — DEXAMETHASONE SODIUM PHOSPHATE 4 MG/ML
4 INJECTION, SOLUTION INTRA-ARTICULAR; INTRALESIONAL; INTRAMUSCULAR; INTRAVENOUS; SOFT TISSUE ONCE
Status: COMPLETED | OUTPATIENT
Start: 2023-03-09 | End: 2023-03-09

## 2023-03-09 RX ORDER — METHYLPREDNISOLONE 4 MG/1
TABLET ORAL
Qty: 21 TABLET | Refills: 0 | Status: SHIPPED | OUTPATIENT
Start: 2023-03-09 | End: 2024-01-31

## 2023-03-09 RX ORDER — AMOXICILLIN AND CLAVULANATE POTASSIUM 875; 125 MG/1; MG/1
1 TABLET, FILM COATED ORAL 2 TIMES DAILY
Qty: 14 TABLET | Refills: 0 | Status: SHIPPED | OUTPATIENT
Start: 2023-03-09 | End: 2023-03-16

## 2023-03-09 RX ADMIN — DEXAMETHASONE SODIUM PHOSPHATE 4 MG: 4 INJECTION, SOLUTION INTRA-ARTICULAR; INTRALESIONAL; INTRAMUSCULAR; INTRAVENOUS; SOFT TISSUE at 16:58

## 2023-03-09 ASSESSMENT — FIBROSIS 4 INDEX: FIB4 SCORE: 1.01

## 2023-03-09 NOTE — PROGRESS NOTES
Chief Complaint   Patient presents with    URI     Lingering cold 2 weeks, phlegm keeping up at night, sinus pressure. Coughing up phlegms.        HISTORY OF PRESENT ILLNESS: Yves Ivory is a 62 y.o. male established patient who presents today to discuss:  - 2 weeks ago started as head cold with dry cough; has since progressed to nasal congestion, post nasal drainage, productive cough. No fever or SOB;  - hx chronic allergic rhinitis; Seeing Dr Rae/[facility]; he is on Astre Pro + Bromide nasal sprays daily. Getting allergy IJ (non-steroid) every 2 weeks (he will let them know he got steroid IJ here).    Current Outpatient Medications on File Prior to Visit   Medication Sig Dispense Refill    rosuvastatin (CRESTOR) 40 MG tablet Take 1 Tablet by mouth every day. 90 Tablet 2    DILTIAZem CD (CARDIZEM CD) 120 MG CAPSULE SR 24 HR TAKE 1 CAPSULE BY MOUTH EVERY DAY 90 Capsule 2    ezetimibe (ZETIA) 10 MG Tab TAKE 1 TABLET BY MOUTH EVERY DAY 90 Tablet 3    Oxymetazoline HCl (AFRIN 12 HOUR NA) Administer  into affected nostril(S).      lisinopril (PRINIVIL) 10 MG Tab Take 1 Tablet by mouth 2 times a day. 1 tab in the morning and 1 tab at night 180 Tablet 3    Empagliflozin (JARDIANCE) 10 MG Tab Take 10 mg by mouth every day. 90 Tablet 4    Calcium Carbonate Antacid (SHAMA-SELTZER ANTACID PO) Take  by mouth.      aspirin EC 81 MG EC tablet Take 1 Tab by mouth every day. 30 Tab 0     No current facility-administered medications on file prior to visit.       has a past medical history of Anxiety, Bronchitis, CAD (coronary artery disease), Chickenpox, Coronary heart disease, Depression, Irish measles, Hyperlipidemia, Hypertension, Influenza, Obesity, Sleep apnea, Ventricular tachycardia, and Whooping cough.     Patient Active Problem List   Diagnosis    History of ST elevation myocardial infarction (STEMI)    Coronary artery disease involving native coronary artery of native heart without angina pectoris    D  "(hyperlipidemia)    VT (ventricular tachycardia)    Cigar smoker    Anxiety    Abdominal gas pain    Preventative health care    Obesity (BMI 30-39.9)    Cough    Type II diabetes mellitus with complication (HCC)    ROSALINO (obstructive sleep apnea)    Former smoker    Biliary colic    Vasculogenic erectile dysfunction    Essential hypertension    History of nocturia    Toenail deformity    Nasal congestion        Allergies:Brilinta [ticagrelor]    Health Maintenance: deferred  Review of Systems -included above  Exam:   /76   Pulse 70   Temp 37.2 °C (99 °F) (Temporal)   Ht 1.803 m (5' 11\")   Wt 107 kg (235 lb)   SpO2 98%   Body mass index is 32.78 kg/m².   Physical Exam  Constitutional:       Appearance: Normal appearance.   HENT:      Head: Normocephalic and atraumatic.      Right Ear: Hearing, tympanic membrane, ear canal and external ear normal.      Left Ear: Hearing, ear canal and external ear normal. Tympanic membrane is bulging.      Nose: Mucosal edema and congestion present.      Right Turbinates: Swollen.      Left Turbinates: Swollen.      Right Sinus: Frontal sinus tenderness present.      Left Sinus: Frontal sinus tenderness present.      Mouth/Throat:      Lips: Pink.      Mouth: Mucous membranes are moist.      Pharynx: Uvula midline. Posterior oropharyngeal erythema present.      Tonsils: No tonsillar exudate or tonsillar abscesses.   Cardiovascular:      Rate and Rhythm: Normal rate and regular rhythm.      Pulses: Normal pulses.      Heart sounds: Normal heart sounds.   Pulmonary:      Effort: Pulmonary effort is normal.      Breath sounds: Normal breath sounds.   Musculoskeletal:      Cervical back: Normal range of motion and neck supple.   Neurological:      Mental Status: He is alert.     Assessment/Plan:  1. Acute recurrent maxillary sinusitis  Productive cough with worsening nasal congestion over the past week. His nostrils engorged, TM bulging on exam. He has not been able to use " prescribed nasal sprays from allergist due to congestion. Decadron IJ today in clinic and then start medrol dose pack tomorrow. Adding 1 week course of augmentin for sinusitis. He is going for allergy shots tomorrow & will let them know I started him on steroid therapy for current symptoms. Continue prn mucinex, nasal irrigation with NS; once inflammation calms down he will resume Astre-PRO + Bromide nasal spray from allergist.     - amoxicillin-clavulanate (AUGMENTIN) 875-125 MG Tab; Take 1 Tablet by mouth 2 times a day for 7 days.  Dispense: 14 Tablet; Refill: 0  - methylPREDNISolone (MEDROL DOSEPAK) 4 MG Tablet Therapy Pack; As directed on the packaging label.  Dispense: 21 Tablet; Refill: 0  - dexamethasone (DECADRON) injection 4 mg      Follow up:  Return if symptoms worsen or fail to improve.    Educated in proper administration of medication(s) ordered today including safety, possible SE, risks, benefits, rationale and alternatives to therapy.       Please note that this dictation was created using voice recognition software. I have made every reasonable attempt to correct obvious errors, but I expect that there are errors of grammar and possibly content that I did not discover before finalizing the note.

## 2023-05-07 ENCOUNTER — PATIENT MESSAGE (OUTPATIENT)
Dept: MEDICAL GROUP | Facility: PHYSICIAN GROUP | Age: 63
End: 2023-05-07
Payer: COMMERCIAL

## 2023-05-07 DIAGNOSIS — E11.8 TYPE II DIABETES MELLITUS WITH COMPLICATION (HCC): ICD-10-CM

## 2023-05-07 DIAGNOSIS — Z13.228 SCREENING FOR METABOLIC DISORDER: ICD-10-CM

## 2023-05-07 DIAGNOSIS — Z12.5 PROSTATE CANCER SCREENING: ICD-10-CM

## 2023-05-07 DIAGNOSIS — E78.2 MIXED HYPERLIPIDEMIA: ICD-10-CM

## 2023-05-07 DIAGNOSIS — I10 ESSENTIAL HYPERTENSION: ICD-10-CM

## 2023-05-31 DIAGNOSIS — I10 ESSENTIAL HYPERTENSION: ICD-10-CM

## 2023-06-01 RX ORDER — LISINOPRIL 10 MG/1
TABLET ORAL
Qty: 180 TABLET | Refills: 0 | Status: SHIPPED | OUTPATIENT
Start: 2023-06-01 | End: 2023-07-18 | Stop reason: SDUPTHER

## 2023-06-01 NOTE — TELEPHONE ENCOUNTER
Received request via: Patient    Was the patient seen in the last year in this department? Yes    Does the patient have an active prescription (recently filled or refills available) for medication(s) requested? No    Does the patient have shelter Plus and need 100 day supply (blood pressure, diabetes and cholesterol meds only)? no

## 2023-06-02 NOTE — TELEPHONE ENCOUNTER
Requested Prescriptions     Signed Prescriptions Disp Refills    lisinopril (PRINIVIL) 10 MG Tab 180 Tablet 0     Sig: TAKE 1 TABLET BY MOUTH 2 TIMES A DAY     Authorizing Provider: VIVIANA GALLARDO A.P.R.N.

## 2023-06-09 ENCOUNTER — HOSPITAL ENCOUNTER (OUTPATIENT)
Dept: LAB | Facility: MEDICAL CENTER | Age: 63
End: 2023-06-09
Attending: NURSE PRACTITIONER
Payer: COMMERCIAL

## 2023-06-09 DIAGNOSIS — Z12.5 PROSTATE CANCER SCREENING: ICD-10-CM

## 2023-06-09 DIAGNOSIS — Z13.228 SCREENING FOR METABOLIC DISORDER: ICD-10-CM

## 2023-06-09 DIAGNOSIS — E78.2 MIXED HYPERLIPIDEMIA: ICD-10-CM

## 2023-06-09 DIAGNOSIS — E11.8 TYPE II DIABETES MELLITUS WITH COMPLICATION (HCC): ICD-10-CM

## 2023-06-09 DIAGNOSIS — I10 ESSENTIAL HYPERTENSION: ICD-10-CM

## 2023-06-09 LAB
ALBUMIN SERPL BCP-MCNC: 4.9 G/DL (ref 3.2–4.9)
ALBUMIN/GLOB SERPL: 2.3 G/DL
ALP SERPL-CCNC: 65 U/L (ref 30–99)
ALT SERPL-CCNC: 42 U/L (ref 2–50)
ANION GAP SERPL CALC-SCNC: 11 MMOL/L (ref 7–16)
AST SERPL-CCNC: 26 U/L (ref 12–45)
BILIRUB SERPL-MCNC: 0.8 MG/DL (ref 0.1–1.5)
BUN SERPL-MCNC: 15 MG/DL (ref 8–22)
CALCIUM ALBUM COR SERPL-MCNC: 9 MG/DL (ref 8.5–10.5)
CALCIUM SERPL-MCNC: 9.7 MG/DL (ref 8.5–10.5)
CHLORIDE SERPL-SCNC: 104 MMOL/L (ref 96–112)
CHOLEST SERPL-MCNC: 119 MG/DL (ref 100–199)
CO2 SERPL-SCNC: 25 MMOL/L (ref 20–33)
CREAT SERPL-MCNC: 0.95 MG/DL (ref 0.5–1.4)
CREAT UR-MCNC: 97.4 MG/DL
ERYTHROCYTE [DISTWIDTH] IN BLOOD BY AUTOMATED COUNT: 40.6 FL (ref 35.9–50)
EST. AVERAGE GLUCOSE BLD GHB EST-MCNC: 157 MG/DL
FASTING STATUS PATIENT QL REPORTED: NORMAL
GFR SERPLBLD CREATININE-BSD FMLA CKD-EPI: 91 ML/MIN/1.73 M 2
GLOBULIN SER CALC-MCNC: 2.1 G/DL (ref 1.9–3.5)
GLUCOSE SERPL-MCNC: 139 MG/DL (ref 65–99)
HBA1C MFR BLD: 7.1 % (ref 4–5.6)
HCT VFR BLD AUTO: 52.5 % (ref 42–52)
HDLC SERPL-MCNC: 32 MG/DL
HGB BLD-MCNC: 17.3 G/DL (ref 14–18)
LDLC SERPL CALC-MCNC: 46 MG/DL
MCH RBC QN AUTO: 30 PG (ref 27–33)
MCHC RBC AUTO-ENTMCNC: 33 G/DL (ref 32.3–36.5)
MCV RBC AUTO: 91.1 FL (ref 81.4–97.8)
MICROALBUMIN UR-MCNC: 1.4 MG/DL
MICROALBUMIN/CREAT UR: 14 MG/G (ref 0–30)
PLATELET # BLD AUTO: 247 K/UL (ref 164–446)
PMV BLD AUTO: 9.6 FL (ref 9–12.9)
POTASSIUM SERPL-SCNC: 5 MMOL/L (ref 3.6–5.5)
PROT SERPL-MCNC: 7 G/DL (ref 6–8.2)
PSA SERPL-MCNC: 0.64 NG/ML (ref 0–4)
RBC # BLD AUTO: 5.76 M/UL (ref 4.7–6.1)
SODIUM SERPL-SCNC: 140 MMOL/L (ref 135–145)
TRIGL SERPL-MCNC: 205 MG/DL (ref 0–149)
TSH SERPL DL<=0.005 MIU/L-ACNC: 1.1 UIU/ML (ref 0.38–5.33)
WBC # BLD AUTO: 7 K/UL (ref 4.8–10.8)

## 2023-06-09 PROCEDURE — 84443 ASSAY THYROID STIM HORMONE: CPT

## 2023-06-09 PROCEDURE — 82043 UR ALBUMIN QUANTITATIVE: CPT

## 2023-06-09 PROCEDURE — 83036 HEMOGLOBIN GLYCOSYLATED A1C: CPT

## 2023-06-09 PROCEDURE — 36415 COLL VENOUS BLD VENIPUNCTURE: CPT

## 2023-06-09 PROCEDURE — 84153 ASSAY OF PSA TOTAL: CPT

## 2023-06-09 PROCEDURE — 82570 ASSAY OF URINE CREATININE: CPT

## 2023-06-09 PROCEDURE — 80053 COMPREHEN METABOLIC PANEL: CPT

## 2023-06-09 PROCEDURE — 80061 LIPID PANEL: CPT

## 2023-06-09 PROCEDURE — 85027 COMPLETE CBC AUTOMATED: CPT

## 2023-07-15 DIAGNOSIS — I25.10 CORONARY ARTERY DISEASE INVOLVING NATIVE CORONARY ARTERY OF NATIVE HEART WITHOUT ANGINA PECTORIS: ICD-10-CM

## 2023-07-15 DIAGNOSIS — I10 ESSENTIAL HYPERTENSION: ICD-10-CM

## 2023-07-18 ENCOUNTER — TELEPHONE (OUTPATIENT)
Dept: CARDIOLOGY | Facility: MEDICAL CENTER | Age: 63
End: 2023-07-18
Payer: COMMERCIAL

## 2023-07-18 DIAGNOSIS — I25.10 CORONARY ARTERY DISEASE INVOLVING NATIVE CORONARY ARTERY OF NATIVE HEART WITHOUT ANGINA PECTORIS: ICD-10-CM

## 2023-07-18 DIAGNOSIS — I10 ESSENTIAL HYPERTENSION: ICD-10-CM

## 2023-07-18 DIAGNOSIS — E78.2 MIXED HYPERLIPIDEMIA: ICD-10-CM

## 2023-07-18 RX ORDER — LISINOPRIL 10 MG/1
10 TABLET ORAL 2 TIMES DAILY
Qty: 180 TABLET | Refills: 0 | Status: SHIPPED | OUTPATIENT
Start: 2023-07-18 | End: 2023-12-03 | Stop reason: SDUPTHER

## 2023-07-18 RX ORDER — ROSUVASTATIN CALCIUM 40 MG/1
40 TABLET, COATED ORAL DAILY
Qty: 90 TABLET | Refills: 0 | Status: SHIPPED | OUTPATIENT
Start: 2023-07-18 | End: 2023-10-13

## 2023-07-18 RX ORDER — DILTIAZEM HYDROCHLORIDE 120 MG/1
120 CAPSULE, COATED, EXTENDED RELEASE ORAL
Qty: 90 CAPSULE | Refills: 0 | Status: SHIPPED | OUTPATIENT
Start: 2023-07-18 | End: 2023-10-13

## 2023-07-18 RX ORDER — EZETIMIBE 10 MG/1
10 TABLET ORAL DAILY
Qty: 90 TABLET | Refills: 0 | Status: SHIPPED | OUTPATIENT
Start: 2023-07-18 | End: 2023-10-23

## 2023-07-18 NOTE — TELEPHONE ENCOUNTER
TW    Caller: Yves Ivory    Medication Name and Dosage:   DILTIAZem CD (CARDIZEM CD) 120 MG CAPSULE SR 24 HR [[id number]]    ezetimibe (ZETIA) 10 MG Tab [[id number]]    rosuvastatin (CRESTOR) 40 MG tablet [[id number]]    lisinopril (PRINIVIL) 10 MG Tab [[id number]]    Please call your pharmacy and have them send us a refill request or speak to a live representative, RX number may have changed.    Medication amount left: DILTIAZEM- 0    Preferred Pharmacy: [facility] on file     Other questions (Topic): Patient states pharmacy has sent messages to him stating medications are not authorized by prescribing provider. Please advise.     Callback Number (Will only call for issues): [phone] (home)     Thank you,   Arlene RIBERA

## 2023-07-19 RX ORDER — DILTIAZEM HYDROCHLORIDE 120 MG/1
120 CAPSULE, COATED, EXTENDED RELEASE ORAL
Qty: 90 CAPSULE | Refills: 2 | OUTPATIENT
Start: 2023-07-19

## 2023-10-13 DIAGNOSIS — I10 ESSENTIAL HYPERTENSION: ICD-10-CM

## 2023-10-13 DIAGNOSIS — E78.2 MIXED HYPERLIPIDEMIA: ICD-10-CM

## 2023-10-13 DIAGNOSIS — I25.10 CORONARY ARTERY DISEASE INVOLVING NATIVE CORONARY ARTERY OF NATIVE HEART WITHOUT ANGINA PECTORIS: ICD-10-CM

## 2023-10-13 RX ORDER — ROSUVASTATIN CALCIUM 40 MG/1
40 TABLET, COATED ORAL DAILY
Qty: 90 TABLET | Refills: 0 | Status: SHIPPED | OUTPATIENT
Start: 2023-10-13 | End: 2024-01-15 | Stop reason: SDUPTHER

## 2023-10-13 RX ORDER — DILTIAZEM HYDROCHLORIDE 120 MG/1
120 CAPSULE, EXTENDED RELEASE ORAL
Qty: 90 CAPSULE | Refills: 0 | Status: SHIPPED | OUTPATIENT
Start: 2023-10-13 | End: 2024-01-15 | Stop reason: SDUPTHER

## 2023-10-18 ENCOUNTER — APPOINTMENT (OUTPATIENT)
Dept: CARDIOLOGY | Facility: MEDICAL CENTER | Age: 63
End: 2023-10-18
Attending: INTERNAL MEDICINE
Payer: COMMERCIAL

## 2023-10-18 DIAGNOSIS — E78.2 MIXED HYPERLIPIDEMIA: ICD-10-CM

## 2023-10-23 RX ORDER — EZETIMIBE 10 MG/1
10 TABLET ORAL DAILY
Qty: 90 TABLET | Refills: 0 | Status: SHIPPED | OUTPATIENT
Start: 2023-10-23 | End: 2024-01-31 | Stop reason: SDUPTHER

## 2023-12-03 DIAGNOSIS — I10 ESSENTIAL HYPERTENSION: ICD-10-CM

## 2023-12-05 RX ORDER — LISINOPRIL 10 MG/1
10 TABLET ORAL 2 TIMES DAILY
Qty: 180 TABLET | Refills: 0 | Status: SHIPPED | OUTPATIENT
Start: 2023-12-05 | End: 2024-01-31 | Stop reason: SDUPTHER

## 2024-01-15 DIAGNOSIS — I25.10 CORONARY ARTERY DISEASE INVOLVING NATIVE CORONARY ARTERY OF NATIVE HEART WITHOUT ANGINA PECTORIS: ICD-10-CM

## 2024-01-15 DIAGNOSIS — E78.2 MIXED HYPERLIPIDEMIA: ICD-10-CM

## 2024-01-15 DIAGNOSIS — I10 ESSENTIAL HYPERTENSION: ICD-10-CM

## 2024-01-16 DIAGNOSIS — E78.2 MIXED HYPERLIPIDEMIA: ICD-10-CM

## 2024-01-16 RX ORDER — ROSUVASTATIN CALCIUM 40 MG/1
40 TABLET, COATED ORAL DAILY
Qty: 90 TABLET | OUTPATIENT
Start: 2024-01-16

## 2024-01-16 RX ORDER — DILTIAZEM HYDROCHLORIDE 120 MG/1
120 CAPSULE, EXTENDED RELEASE ORAL
Qty: 30 CAPSULE | Refills: 0 | Status: SHIPPED | OUTPATIENT
Start: 2024-01-16 | End: 2024-01-31 | Stop reason: SDUPTHER

## 2024-01-16 RX ORDER — ROSUVASTATIN CALCIUM 40 MG/1
40 TABLET, COATED ORAL DAILY
Qty: 30 TABLET | Refills: 0 | Status: SHIPPED | OUTPATIENT
Start: 2024-01-16 | End: 2024-01-31 | Stop reason: SDUPTHER

## 2024-01-16 NOTE — TELEPHONE ENCOUNTER
Is the patient due for a refill? Yes    Was the patient seen the past year? No    Date of last office visit: 9/13/22    Does the patient have an upcoming appointment?  Yes   If yes, When? [phone]    Provider to refill:TW    Does the patients insurance require a 100 day supply?  No

## 2024-01-19 DIAGNOSIS — I10 ESSENTIAL HYPERTENSION: ICD-10-CM

## 2024-01-19 DIAGNOSIS — I25.10 CORONARY ARTERY DISEASE INVOLVING NATIVE CORONARY ARTERY OF NATIVE HEART WITHOUT ANGINA PECTORIS: ICD-10-CM

## 2024-01-22 RX ORDER — DILTIAZEM HYDROCHLORIDE 120 MG/1
120 CAPSULE, EXTENDED RELEASE ORAL
Qty: 90 CAPSULE | OUTPATIENT
Start: 2024-01-22

## 2024-01-22 RX ORDER — DILTIAZEM HYDROCHLORIDE 120 MG/1
CAPSULE, COATED, EXTENDED RELEASE ORAL
Qty: 90 CAPSULE | Refills: 2 | OUTPATIENT
Start: 2024-01-22

## 2024-01-31 ENCOUNTER — OFFICE VISIT (OUTPATIENT)
Dept: CARDIOLOGY | Facility: MEDICAL CENTER | Age: 64
End: 2024-01-31
Attending: INTERNAL MEDICINE
Payer: COMMERCIAL

## 2024-01-31 VITALS
BODY MASS INDEX: 33.6 KG/M2 | OXYGEN SATURATION: 93 % | WEIGHT: 240 LBS | HEIGHT: 71 IN | HEART RATE: 94 BPM | SYSTOLIC BLOOD PRESSURE: 124 MMHG | RESPIRATION RATE: 16 BRPM | DIASTOLIC BLOOD PRESSURE: 86 MMHG

## 2024-01-31 DIAGNOSIS — E78.2 MIXED HYPERLIPIDEMIA: ICD-10-CM

## 2024-01-31 DIAGNOSIS — I25.10 CORONARY ARTERY DISEASE INVOLVING NATIVE CORONARY ARTERY OF NATIVE HEART WITHOUT ANGINA PECTORIS: ICD-10-CM

## 2024-01-31 DIAGNOSIS — E11.8 TYPE II DIABETES MELLITUS WITH COMPLICATION (HCC): ICD-10-CM

## 2024-01-31 DIAGNOSIS — I10 ESSENTIAL HYPERTENSION: ICD-10-CM

## 2024-01-31 PROCEDURE — 99212 OFFICE O/P EST SF 10 MIN: CPT | Performed by: INTERNAL MEDICINE

## 2024-01-31 PROCEDURE — 3079F DIAST BP 80-89 MM HG: CPT | Performed by: INTERNAL MEDICINE

## 2024-01-31 PROCEDURE — 3074F SYST BP LT 130 MM HG: CPT | Performed by: INTERNAL MEDICINE

## 2024-01-31 PROCEDURE — 99213 OFFICE O/P EST LOW 20 MIN: CPT | Performed by: INTERNAL MEDICINE

## 2024-01-31 RX ORDER — ROSUVASTATIN CALCIUM 40 MG/1
40 TABLET, COATED ORAL DAILY
Qty: 90 TABLET | Refills: 3 | Status: ON HOLD | OUTPATIENT
Start: 2024-01-31 | End: 2024-03-06

## 2024-01-31 RX ORDER — ACETAMINOPHEN 500 MG
500 TABLET ORAL NIGHTLY
COMMUNITY

## 2024-01-31 RX ORDER — LISINOPRIL 10 MG/1
10 TABLET ORAL 2 TIMES DAILY
Qty: 180 TABLET | Refills: 3 | Status: SHIPPED | OUTPATIENT
Start: 2024-01-31 | End: 2024-03-20 | Stop reason: SDUPTHER

## 2024-01-31 RX ORDER — EZETIMIBE 10 MG/1
10 TABLET ORAL DAILY
Qty: 90 TABLET | Refills: 3 | Status: SHIPPED | OUTPATIENT
Start: 2024-01-31 | End: 2024-03-20 | Stop reason: SDUPTHER

## 2024-01-31 RX ORDER — ACETAMINOPHEN/DIPHENHYDRAMINE 500MG-25MG
2 TABLET ORAL
COMMUNITY

## 2024-01-31 RX ORDER — DILTIAZEM HYDROCHLORIDE 120 MG/1
120 CAPSULE, EXTENDED RELEASE ORAL
Qty: 90 CAPSULE | Refills: 3 | Status: SHIPPED | OUTPATIENT
Start: 2024-01-31 | End: 2024-03-20

## 2024-01-31 ASSESSMENT — FIBROSIS 4 INDEX: FIB4 SCORE: 0.99

## 2024-01-31 NOTE — PROGRESS NOTES
Subjective:   Yves Ivory is a 63y.o. male who presents today for follow-up of CAD hypertension and hyperlipidemia.    Hx of anxiety, HLD, obesity, HTN, anxiety, depression, smoking hx, and now CAD s/p STEMI with DENISSE to OM, VF arrest.    Since last year he has been doing well exercising at work but outside of work very often but has no exertional symptoms takes his medication as directed.    Past Medical History:   Diagnosis Date    Anxiety     Bronchitis     CAD (coronary artery disease)     STEMI-S/P DENISSE to OM    Chickenpox     Coronary heart disease     Depression     [place] measles     Hyperlipidemia     Hypertension     Influenza     Obesity     Sleep apnea     Ventricular tachycardia (HCC)     post STEMI     Whooping cough      Past Surgical History:   Procedure Laterality Date    ANGIOPLASTY      APPENDECTOMY CHILD      TONSILLECTOMY      ZZZ CARDIAC CATH       Family History   Problem Relation Age of Onset    Cancer Mother         breast cancer    Heart Disease Father 38        heart transplant, 3 MI's, multiple bypass    Stroke Maternal Grandmother      Social History     Tobacco Use   Smoking Status Former    Current packs/day: 0.00    Average packs/day: 1 pack/day for 40.0 years (40.0 ttl pk-yrs)    Types: Cigars, Cigarettes    Start date:     Quit date: 2016    Years since quittin.0   Smokeless Tobacco Former    Types: Chew    Quit date:    Tobacco Comments    1 - 2 cigars on the weeknds      Allergies   Allergen Reactions    Brilinta [Ticagrelor] Shortness of Breath     Med put patient in ER twice     Outpatient Encounter Medications as of 2024   Medication Sig Dispense Refill    diphenhydrAMINE-APAP, sleep, (TYLENOL PM EXTRA STRENGTH)  MG Tab Take  by mouth at bedtime.      acetaminophen (TYLENOL) 325 MG Tab Take 325 mg by mouth at bedtime.      NON SPECIFIED Allergy shots every month with Dr. Rae      Azelastine HCl (ASTEPRO NA) Administer  into affected nostril(S) at  "bedtime. 2 sprays each nostril @ noc      diltiazem (TIAZAC) 120 MG SR capsule Take 1 Capsule by mouth every day. 90 Capsule 3    ezetimibe (ZETIA) 10 MG Tab Take 1 Tablet by mouth every day. 90 Tablet 3    lisinopril (PRINIVIL) 10 MG Tab Take 1 Tablet by mouth 2 times a day. 180 Tablet 3    rosuvastatin (CRESTOR) 40 MG tablet Take 1 Tablet by mouth every day. 90 Tablet 3    Calcium Carbonate Antacid (SHAMA-SELTZER ANTACID PO) Take  by mouth at bedtime.      aspirin EC 81 MG EC tablet Take 1 Tab by mouth every day. 30 Tab 0    [DISCONTINUED] rosuvastatin (CRESTOR) 40 MG tablet Take 1 Tablet by mouth every day. 30 Tablet 0    [DISCONTINUED] diltiazem (TIAZAC) 120 MG SR capsule Take 1 Capsule by mouth every day. 30 Capsule 0    [DISCONTINUED] lisinopril (PRINIVIL) 10 MG Tab Take 1 Tablet by mouth 2 times a day. 180 Tablet 0    [DISCONTINUED] ezetimibe (ZETIA) 10 MG Tab TAKE 1 TABLET BY MOUTH EVERY DAY 90 Tablet 0    [DISCONTINUED] Empagliflozin (JARDIANCE) 10 MG Tab tablet Take 1 Tablet by mouth every day. 90 Tablet 3    [DISCONTINUED] methylPREDNISolone (MEDROL DOSEPAK) 4 MG Tablet Therapy Pack As directed on the packaging label. 21 Tablet 0    [DISCONTINUED] Oxymetazoline HCl (AFRIN 12 HOUR NA) Administer  into affected nostril(S). (Patient not taking: Reported on 1/31/2024)       No facility-administered encounter medications on file as of 1/31/2024.     Review of Systems   All other systems reviewed and are negative.       Objective:   /86 (BP Location: Left arm, Patient Position: Sitting)   Pulse 94   Resp 16   Ht 1.803 m (5' 11\")   Wt 109 kg (240 lb)   SpO2 93%   BMI 33.47 kg/m²     Physical Exam  Vitals reviewed.   Constitutional:       General: He is not in acute distress.     Appearance: He is well-developed. He is not diaphoretic.   HENT:      Head: Normocephalic and atraumatic.      Right Ear: External ear normal.      Left Ear: External ear normal.   Eyes:      General: No scleral icterus.       "  Right eye: No discharge.         Left eye: No discharge.      Conjunctiva/sclera: Conjunctivae normal.      Pupils: Pupils are equal, round, and reactive to light.   Neck:      Thyroid: No thyromegaly.      Vascular: No JVD.      Trachea: No tracheal deviation.   Cardiovascular:      Rate and Rhythm: Normal rate and regular rhythm.      Chest Wall: PMI is not displaced.      Pulses:           Carotid pulses are 2+ on the right side and 2+ on the left side.       Radial pulses are 2+ on the left side.        Popliteal pulses are 2+ on the right side and 2+ on the left side.        Dorsalis pedis pulses are 2+ on the right side and 2+ on the left side.        Posterior tibial pulses are 2+ on the right side and 2+ on the left side.      Heart sounds: No murmur heard.     No friction rub. No gallop.   Pulmonary:      Effort: Pulmonary effort is normal. No respiratory distress.      Breath sounds: Normal breath sounds. No wheezing or rales.   Chest:      Chest wall: No tenderness.   Abdominal:      General: Bowel sounds are normal. There is no distension.      Palpations: Abdomen is soft.      Tenderness: There is no abdominal tenderness.   Musculoskeletal:         General: No tenderness or deformity. Normal range of motion.      Cervical back: Normal range of motion and neck supple.   Skin:     General: Skin is warm and dry.      Coloration: Skin is not pale.      Findings: No erythema or rash.   Neurological:      Mental Status: He is alert and oriented to person, place, and time.      Cranial Nerves: No cranial nerve deficit (cranial nerves II through XII grossly intact).      Coordination: Coordination normal.   Psychiatric:         Behavior: Behavior normal.         Thought Content: Thought content normal.       7/7/16 ANGIOGRAM POSTPROCEDURE DIAGNOSES:  1.  Coronary artery disease including occluded proximal obtuse marginal    branch, additional nonobstructive mid left anterior descending artery    stenosis.  2.   Successful thrombectomy/PTCA/stent placement of the proximal to mid obtuse   marginal branch with 3.0x32-mm Promus PREMIER drug-eluting stent.  3.  Angio-Seal closure.    7/8/16 ECHO CONCLUSIONS  Left ventricular ejection fraction is visually estimated to be 55%,   inferior hypokinesis.  Grade II-III diastolic dysfunction.  Mild concentric left ventricular   hypertrophy.  Dilated inferior vena cava with inspiratory collapse.  No significant valve disease or flow abnormalities.   No prior study is available for comparison.     Lab Results   Component Value Date/Time    WBC 7.0 06/09/2023 06:30 AM    RBC 5.76 06/09/2023 06:30 AM    HEMOGLOBIN 17.3 06/09/2023 06:30 AM    HEMATOCRIT 52.5 (H) 06/09/2023 06:30 AM    MCV 91.1 06/09/2023 06:30 AM    MCH 30.0 06/09/2023 06:30 AM    MCHC 33.0 06/09/2023 06:30 AM    MPV 9.6 06/09/2023 06:30 AM      Lab Results   Component Value Date/Time    CHOLSTRLTOT 119 06/09/2023 06:30 AM    LDL 46 06/09/2023 06:30 AM    HDL 32 (A) 06/09/2023 06:30 AM    TRIGLYCERIDE 205 (H) 06/09/2023 06:30 AM       Lab Results   Component Value Date/Time    SODIUM 140 06/09/2023 06:30 AM    POTASSIUM 5.0 06/09/2023 06:30 AM    CHLORIDE 104 06/09/2023 06:30 AM    CO2 25 06/09/2023 06:30 AM    GLUCOSE 139 (H) 06/09/2023 06:30 AM    BUN 15 06/09/2023 06:30 AM    CREATININE 0.95 06/09/2023 06:30 AM     Lab Results   Component Value Date/Time    ALKPHOSPHAT 65 06/09/2023 06:30 AM    ASTSGOT 26 06/09/2023 06:30 AM    ALTSGPT 42 06/09/2023 06:30 AM    TBILIRUBIN 0.8 06/09/2023 06:30 AM      Lab Results   Component Value Date/Time    BNPBTYPENAT 6 10/06/2016 02:20 PM      Assessment:     1. Coronary artery disease involving native coronary artery of native heart without angina pectoris  diltiazem (TIAZAC) 120 MG SR capsule      2. Type II diabetes mellitus with complication (HCC)        3. Essential hypertension  diltiazem (TIAZAC) 120 MG SR capsule    lisinopril (PRINIVIL) 10 MG Tab      4. Mixed hyperlipidemia   ezetimibe (ZETIA) 10 MG Tab    rosuvastatin (CRESTOR) 40 MG tablet          Medical Decision Making:  Today's Assessment / Status / Plan:     Doing very well lipid profile is at goal with an LDL less than 70 closer to 50, blood pressure remains well-controlled.  Medications refilled without change today.  Recommend increased physical activity routinely outside of work hours if he can manage.  Follow-up routinely.

## 2024-03-05 ENCOUNTER — OFFICE VISIT (OUTPATIENT)
Dept: URGENT CARE | Facility: PHYSICIAN GROUP | Age: 64
End: 2024-03-05
Payer: COMMERCIAL

## 2024-03-05 ENCOUNTER — APPOINTMENT (OUTPATIENT)
Dept: RADIOLOGY | Facility: MEDICAL CENTER | Age: 64
DRG: 321 | End: 2024-03-05
Attending: STUDENT IN AN ORGANIZED HEALTH CARE EDUCATION/TRAINING PROGRAM
Payer: COMMERCIAL

## 2024-03-05 ENCOUNTER — HOSPITAL ENCOUNTER (INPATIENT)
Facility: MEDICAL CENTER | Age: 64
LOS: 1 days | DRG: 321 | End: 2024-03-06
Attending: STUDENT IN AN ORGANIZED HEALTH CARE EDUCATION/TRAINING PROGRAM | Admitting: HOSPITALIST
Payer: COMMERCIAL

## 2024-03-05 ENCOUNTER — APPOINTMENT (OUTPATIENT)
Dept: CARDIOLOGY | Facility: MEDICAL CENTER | Age: 64
DRG: 321 | End: 2024-03-05
Attending: INTERNAL MEDICINE
Payer: COMMERCIAL

## 2024-03-05 VITALS
DIASTOLIC BLOOD PRESSURE: 90 MMHG | HEIGHT: 71 IN | BODY MASS INDEX: 33.04 KG/M2 | WEIGHT: 236 LBS | TEMPERATURE: 97.3 F | HEART RATE: 92 BPM | OXYGEN SATURATION: 96 % | SYSTOLIC BLOOD PRESSURE: 140 MMHG | RESPIRATION RATE: 16 BRPM

## 2024-03-05 DIAGNOSIS — E11.8 TYPE II DIABETES MELLITUS WITH COMPLICATION (HCC): ICD-10-CM

## 2024-03-05 DIAGNOSIS — I10 PRIMARY HYPERTENSION: ICD-10-CM

## 2024-03-05 DIAGNOSIS — R73.9 HYPERGLYCEMIA: ICD-10-CM

## 2024-03-05 DIAGNOSIS — K29.00 ACUTE GASTRITIS WITHOUT HEMORRHAGE, UNSPECIFIED GASTRITIS TYPE: ICD-10-CM

## 2024-03-05 DIAGNOSIS — I21.4 NSTEMI (NON-ST ELEVATED MYOCARDIAL INFARCTION) (HCC): ICD-10-CM

## 2024-03-05 DIAGNOSIS — I25.10 CORONARY ARTERY DISEASE INVOLVING NATIVE CORONARY ARTERY OF NATIVE HEART WITHOUT ANGINA PECTORIS: ICD-10-CM

## 2024-03-05 DIAGNOSIS — E11.69 TYPE 2 DIABETES MELLITUS WITH OTHER SPECIFIED COMPLICATION, UNSPECIFIED WHETHER LONG TERM INSULIN USE (HCC): ICD-10-CM

## 2024-03-05 LAB
ACT BLD: 223 SEC (ref 74–137)
ACT BLD: 331 SEC (ref 74–137)
ALBUMIN SERPL BCP-MCNC: 4.9 G/DL (ref 3.2–4.9)
ALBUMIN/GLOB SERPL: 1.6 G/DL
ALP SERPL-CCNC: 91 U/L (ref 30–99)
ALT SERPL-CCNC: 44 U/L (ref 2–50)
ANION GAP SERPL CALC-SCNC: 16 MMOL/L (ref 7–16)
APPEARANCE UR: CLEAR
APTT PPP: 23.8 SEC (ref 24.7–36)
AST SERPL-CCNC: 68 U/L (ref 12–45)
BACTERIA #/AREA URNS HPF: NEGATIVE /HPF
BASOPHILS # BLD AUTO: 0.3 % (ref 0–1.8)
BASOPHILS # BLD: 0.03 K/UL (ref 0–0.12)
BILIRUB SERPL-MCNC: 1 MG/DL (ref 0.1–1.5)
BILIRUB UR QL STRIP.AUTO: NEGATIVE
BUN SERPL-MCNC: 11 MG/DL (ref 8–22)
CALCIUM ALBUM COR SERPL-MCNC: 9.4 MG/DL (ref 8.5–10.5)
CALCIUM SERPL-MCNC: 10.1 MG/DL (ref 8.5–10.5)
CHLORIDE SERPL-SCNC: 96 MMOL/L (ref 96–112)
CO2 SERPL-SCNC: 22 MMOL/L (ref 20–33)
COLOR UR: YELLOW
CREAT SERPL-MCNC: 0.72 MG/DL (ref 0.5–1.4)
EKG IMPRESSION: NORMAL
EOSINOPHIL # BLD AUTO: 0.05 K/UL (ref 0–0.51)
EOSINOPHIL NFR BLD: 0.5 % (ref 0–6.9)
EPI CELLS #/AREA URNS HPF: NEGATIVE /HPF
ERYTHROCYTE [DISTWIDTH] IN BLOOD BY AUTOMATED COUNT: 35.6 FL (ref 35.9–50)
EST. AVERAGE GLUCOSE BLD GHB EST-MCNC: 243 MG/DL
GFR SERPLBLD CREATININE-BSD FMLA CKD-EPI: 104 ML/MIN/1.73 M 2
GLOBULIN SER CALC-MCNC: 3 G/DL (ref 1.9–3.5)
GLUCOSE SERPL-MCNC: 246 MG/DL (ref 65–99)
GLUCOSE UR STRIP.AUTO-MCNC: >=1000 MG/DL
HBA1C MFR BLD: 10.1 % (ref 4–5.6)
HCT VFR BLD AUTO: 48.8 % (ref 42–52)
HGB BLD-MCNC: 17.1 G/DL (ref 14–18)
HYALINE CASTS #/AREA URNS LPF: ABNORMAL /LPF
IMM GRANULOCYTES # BLD AUTO: 0.03 K/UL (ref 0–0.11)
IMM GRANULOCYTES NFR BLD AUTO: 0.3 % (ref 0–0.9)
INR PPP: 1.03 (ref 0.87–1.13)
KETONES UR STRIP.AUTO-MCNC: 40 MG/DL
LEUKOCYTE ESTERASE UR QL STRIP.AUTO: NEGATIVE
LIPASE SERPL-CCNC: 34 U/L (ref 11–82)
LYMPHOCYTES # BLD AUTO: 2.18 K/UL (ref 1–4.8)
LYMPHOCYTES NFR BLD: 23.7 % (ref 22–41)
MCH RBC QN AUTO: 29.6 PG (ref 27–33)
MCHC RBC AUTO-ENTMCNC: 35 G/DL (ref 32.3–36.5)
MCV RBC AUTO: 84.4 FL (ref 81.4–97.8)
MICRO URNS: ABNORMAL
MONOCYTES # BLD AUTO: 0.71 K/UL (ref 0–0.85)
MONOCYTES NFR BLD AUTO: 7.7 % (ref 0–13.4)
NEUTROPHILS # BLD AUTO: 6.19 K/UL (ref 1.82–7.42)
NEUTROPHILS NFR BLD: 67.5 % (ref 44–72)
NITRITE UR QL STRIP.AUTO: NEGATIVE
NRBC # BLD AUTO: 0 K/UL
NRBC BLD-RTO: 0 /100 WBC (ref 0–0.2)
PH UR STRIP.AUTO: 5 [PH] (ref 5–8)
PLATELET # BLD AUTO: 267 K/UL (ref 164–446)
PMV BLD AUTO: 10.1 FL (ref 9–12.9)
POTASSIUM SERPL-SCNC: 4.2 MMOL/L (ref 3.6–5.5)
PROT SERPL-MCNC: 7.9 G/DL (ref 6–8.2)
PROT UR QL STRIP: 100 MG/DL
PROTHROMBIN TIME: 13.7 SEC (ref 12–14.6)
RBC # BLD AUTO: 5.78 M/UL (ref 4.7–6.1)
RBC # URNS HPF: ABNORMAL /HPF
RBC UR QL AUTO: ABNORMAL
SODIUM SERPL-SCNC: 134 MMOL/L (ref 135–145)
SP GR UR STRIP.AUTO: 1.04
TROPONIN T SERPL-MCNC: 319 NG/L (ref 6–19)
TROPONIN T SERPL-MCNC: 360 NG/L (ref 6–19)
UFH PPP CHRO-ACNC: <0.1 IU/ML
UROBILINOGEN UR STRIP.AUTO-MCNC: 0.2 MG/DL
WBC # BLD AUTO: 9.2 K/UL (ref 4.8–10.8)
WBC #/AREA URNS HPF: ABNORMAL /HPF

## 2024-03-05 PROCEDURE — 71045 X-RAY EXAM CHEST 1 VIEW: CPT

## 2024-03-05 PROCEDURE — 3077F SYST BP >= 140 MM HG: CPT | Performed by: FAMILY MEDICINE

## 2024-03-05 PROCEDURE — B2151ZZ FLUOROSCOPY OF LEFT HEART USING LOW OSMOLAR CONTRAST: ICD-10-PCS | Performed by: INTERNAL MEDICINE

## 2024-03-05 PROCEDURE — A9270 NON-COVERED ITEM OR SERVICE: HCPCS

## 2024-03-05 PROCEDURE — 99291 CRITICAL CARE FIRST HOUR: CPT

## 2024-03-05 PROCEDURE — 83690 ASSAY OF LIPASE: CPT

## 2024-03-05 PROCEDURE — 700111 HCHG RX REV CODE 636 W/ 250 OVERRIDE (IP): Performed by: STUDENT IN AN ORGANIZED HEALTH CARE EDUCATION/TRAINING PROGRAM

## 2024-03-05 PROCEDURE — 85520 HEPARIN ASSAY: CPT

## 2024-03-05 PROCEDURE — A9270 NON-COVERED ITEM OR SERVICE: HCPCS | Performed by: HOSPITALIST

## 2024-03-05 PROCEDURE — 93005 ELECTROCARDIOGRAM TRACING: CPT

## 2024-03-05 PROCEDURE — B2111ZZ FLUOROSCOPY OF MULTIPLE CORONARY ARTERIES USING LOW OSMOLAR CONTRAST: ICD-10-PCS | Performed by: INTERNAL MEDICINE

## 2024-03-05 PROCEDURE — 36415 COLL VENOUS BLD VENIPUNCTURE: CPT

## 2024-03-05 PROCEDURE — 99214 OFFICE O/P EST MOD 30 MIN: CPT | Performed by: FAMILY MEDICINE

## 2024-03-05 PROCEDURE — 700101 HCHG RX REV CODE 250

## 2024-03-05 PROCEDURE — 96376 TX/PRO/DX INJ SAME DRUG ADON: CPT

## 2024-03-05 PROCEDURE — 700102 HCHG RX REV CODE 250 W/ 637 OVERRIDE(OP)

## 2024-03-05 PROCEDURE — 700105 HCHG RX REV CODE 258: Performed by: INTERNAL MEDICINE

## 2024-03-05 PROCEDURE — 85610 PROTHROMBIN TIME: CPT

## 2024-03-05 PROCEDURE — 81001 URINALYSIS AUTO W/SCOPE: CPT

## 2024-03-05 PROCEDURE — 85025 COMPLETE CBC W/AUTO DIFF WBC: CPT

## 2024-03-05 PROCEDURE — 3080F DIAST BP >= 90 MM HG: CPT | Performed by: FAMILY MEDICINE

## 2024-03-05 PROCEDURE — 99152 MOD SED SAME PHYS/QHP 5/>YRS: CPT | Performed by: INTERNAL MEDICINE

## 2024-03-05 PROCEDURE — 93005 ELECTROCARDIOGRAM TRACING: CPT | Performed by: HOSPITALIST

## 2024-03-05 PROCEDURE — 700117 HCHG RX CONTRAST REV CODE 255: Performed by: INTERNAL MEDICINE

## 2024-03-05 PROCEDURE — 700102 HCHG RX REV CODE 250 W/ 637 OVERRIDE(OP): Performed by: STUDENT IN AN ORGANIZED HEALTH CARE EDUCATION/TRAINING PROGRAM

## 2024-03-05 PROCEDURE — A9270 NON-COVERED ITEM OR SERVICE: HCPCS | Performed by: STUDENT IN AN ORGANIZED HEALTH CARE EDUCATION/TRAINING PROGRAM

## 2024-03-05 PROCEDURE — 80053 COMPREHEN METABOLIC PANEL: CPT

## 2024-03-05 PROCEDURE — 93005 ELECTROCARDIOGRAM TRACING: CPT | Performed by: STUDENT IN AN ORGANIZED HEALTH CARE EDUCATION/TRAINING PROGRAM

## 2024-03-05 PROCEDURE — 83036 HEMOGLOBIN GLYCOSYLATED A1C: CPT

## 2024-03-05 PROCEDURE — 96365 THER/PROPH/DIAG IV INF INIT: CPT

## 2024-03-05 PROCEDURE — 160002 HCHG RECOVERY MINUTES (STAT)

## 2024-03-05 PROCEDURE — 770020 HCHG ROOM/CARE - TELE (206)

## 2024-03-05 PROCEDURE — 85347 COAGULATION TIME ACTIVATED: CPT

## 2024-03-05 PROCEDURE — 4A023N7 MEASUREMENT OF CARDIAC SAMPLING AND PRESSURE, LEFT HEART, PERCUTANEOUS APPROACH: ICD-10-PCS | Performed by: INTERNAL MEDICINE

## 2024-03-05 PROCEDURE — 0270346 DILATION OF CORONARY ARTERY, ONE ARTERY, BIFURCATION, WITH DRUG-ELUTING INTRALUMINAL DEVICE, PERCUTANEOUS APPROACH: ICD-10-PCS | Performed by: INTERNAL MEDICINE

## 2024-03-05 PROCEDURE — 99223 1ST HOSP IP/OBS HIGH 75: CPT | Mod: 25 | Performed by: INTERNAL MEDICINE

## 2024-03-05 PROCEDURE — 92921 PR PRQ TRLUML CORONARY ANGIOPLASTY ADDL BRANCH: CPT | Mod: RC | Performed by: INTERNAL MEDICINE

## 2024-03-05 PROCEDURE — 700102 HCHG RX REV CODE 250 W/ 637 OVERRIDE(OP): Performed by: HOSPITALIST

## 2024-03-05 PROCEDURE — 93458 L HRT ARTERY/VENTRICLE ANGIO: CPT | Mod: 26,59 | Performed by: INTERNAL MEDICINE

## 2024-03-05 PROCEDURE — 96375 TX/PRO/DX INJ NEW DRUG ADDON: CPT

## 2024-03-05 PROCEDURE — 84484 ASSAY OF TROPONIN QUANT: CPT

## 2024-03-05 PROCEDURE — 85730 THROMBOPLASTIN TIME PARTIAL: CPT

## 2024-03-05 PROCEDURE — 92941 PRQ TRLML REVSC TOT OCCL AMI: CPT | Mod: RC | Performed by: INTERNAL MEDICINE

## 2024-03-05 PROCEDURE — 160035 HCHG PACU - 1ST 60 MINS PHASE I

## 2024-03-05 PROCEDURE — 700111 HCHG RX REV CODE 636 W/ 250 OVERRIDE (IP)

## 2024-03-05 PROCEDURE — 99153 MOD SED SAME PHYS/QHP EA: CPT

## 2024-03-05 PROCEDURE — 99291 CRITICAL CARE FIRST HOUR: CPT | Performed by: HOSPITALIST

## 2024-03-05 RX ORDER — ONDANSETRON 2 MG/ML
4 INJECTION INTRAMUSCULAR; INTRAVENOUS ONCE
Status: COMPLETED | OUTPATIENT
Start: 2024-03-05 | End: 2024-03-05

## 2024-03-05 RX ORDER — ACETAMINOPHEN 325 MG/1
650 TABLET ORAL EVERY 6 HOURS PRN
Status: DISCONTINUED | OUTPATIENT
Start: 2024-03-05 | End: 2024-03-06 | Stop reason: HOSPADM

## 2024-03-05 RX ORDER — PRASUGREL 10 MG/1
10 TABLET, FILM COATED ORAL DAILY
Status: DISCONTINUED | OUTPATIENT
Start: 2024-03-06 | End: 2024-03-06 | Stop reason: HOSPADM

## 2024-03-05 RX ORDER — ADENOSINE 3 MG/ML
INJECTION, SOLUTION INTRAVENOUS
Status: COMPLETED
Start: 2024-03-05 | End: 2024-03-05

## 2024-03-05 RX ORDER — HEPARIN SODIUM 5000 [USP'U]/100ML
0-30 INJECTION, SOLUTION INTRAVENOUS CONTINUOUS
Status: DISCONTINUED | OUTPATIENT
Start: 2024-03-05 | End: 2024-03-05

## 2024-03-05 RX ORDER — PRASUGREL 10 MG/1
TABLET, FILM COATED ORAL
Status: COMPLETED
Start: 2024-03-05 | End: 2024-03-05

## 2024-03-05 RX ORDER — MIDAZOLAM HYDROCHLORIDE 1 MG/ML
INJECTION INTRAMUSCULAR; INTRAVENOUS
Status: COMPLETED
Start: 2024-03-05 | End: 2024-03-05

## 2024-03-05 RX ORDER — ASPIRIN 81 MG/1
81 TABLET ORAL DAILY
Status: DISCONTINUED | OUTPATIENT
Start: 2024-03-06 | End: 2024-03-05

## 2024-03-05 RX ORDER — HEPARIN SODIUM 1000 [USP'U]/ML
2000 INJECTION, SOLUTION INTRAVENOUS; SUBCUTANEOUS PRN
Status: DISCONTINUED | OUTPATIENT
Start: 2024-03-05 | End: 2024-03-05

## 2024-03-05 RX ORDER — DILTIAZEM HYDROCHLORIDE 120 MG/1
120 CAPSULE, COATED, EXTENDED RELEASE ORAL EVERY MORNING
Status: DISCONTINUED | OUTPATIENT
Start: 2024-03-06 | End: 2024-03-06 | Stop reason: HOSPADM

## 2024-03-05 RX ORDER — HEPARIN SODIUM 1000 [USP'U]/ML
4000 INJECTION, SOLUTION INTRAVENOUS; SUBCUTANEOUS ONCE
Status: COMPLETED | OUTPATIENT
Start: 2024-03-05 | End: 2024-03-05

## 2024-03-05 RX ORDER — LISINOPRIL 10 MG/1
10 TABLET ORAL 2 TIMES DAILY
Status: DISCONTINUED | OUTPATIENT
Start: 2024-03-05 | End: 2024-03-06 | Stop reason: HOSPADM

## 2024-03-05 RX ORDER — MORPHINE SULFATE 4 MG/ML
4 INJECTION INTRAVENOUS ONCE
Status: COMPLETED | OUTPATIENT
Start: 2024-03-05 | End: 2024-03-05

## 2024-03-05 RX ORDER — SUCRALFATE 1 G/1
1 TABLET ORAL
Qty: 120 TABLET | Refills: 3 | Status: SHIPPED | OUTPATIENT
Start: 2024-03-05

## 2024-03-05 RX ORDER — NITROGLYCERIN 0.4 MG/1
0.4 TABLET SUBLINGUAL
Status: DISCONTINUED | OUTPATIENT
Start: 2024-03-05 | End: 2024-03-06 | Stop reason: HOSPADM

## 2024-03-05 RX ORDER — VERAPAMIL HYDROCHLORIDE 2.5 MG/ML
INJECTION, SOLUTION INTRAVENOUS
Status: COMPLETED
Start: 2024-03-05 | End: 2024-03-05

## 2024-03-05 RX ORDER — HEPARIN SODIUM 200 [USP'U]/100ML
INJECTION, SOLUTION INTRAVENOUS
Status: COMPLETED
Start: 2024-03-05 | End: 2024-03-05

## 2024-03-05 RX ORDER — ASPIRIN 325 MG
325 TABLET ORAL ONCE
Status: COMPLETED | OUTPATIENT
Start: 2024-03-05 | End: 2024-03-05

## 2024-03-05 RX ORDER — SODIUM CHLORIDE 9 MG/ML
3 INJECTION, SOLUTION INTRAVENOUS CONTINUOUS
Status: ACTIVE | OUTPATIENT
Start: 2024-03-05 | End: 2024-03-06

## 2024-03-05 RX ORDER — HEPARIN SODIUM 1000 [USP'U]/ML
INJECTION, SOLUTION INTRAVENOUS; SUBCUTANEOUS
Status: COMPLETED
Start: 2024-03-05 | End: 2024-03-05

## 2024-03-05 RX ORDER — DEXTROSE MONOHYDRATE 25 G/50ML
25 INJECTION, SOLUTION INTRAVENOUS
Status: DISCONTINUED | OUTPATIENT
Start: 2024-03-05 | End: 2024-03-06 | Stop reason: HOSPADM

## 2024-03-05 RX ORDER — PRASUGREL 10 MG/1
60 TABLET, FILM COATED ORAL ONCE
Status: DISCONTINUED | OUTPATIENT
Start: 2024-03-05 | End: 2024-03-05

## 2024-03-05 RX ORDER — ATORVASTATIN CALCIUM 40 MG/1
40 TABLET, FILM COATED ORAL EVERY EVENING
Status: DISCONTINUED | OUTPATIENT
Start: 2024-03-05 | End: 2024-03-06 | Stop reason: HOSPADM

## 2024-03-05 RX ORDER — ASPIRIN 81 MG/1
81 TABLET ORAL DAILY
Status: DISCONTINUED | OUTPATIENT
Start: 2024-03-06 | End: 2024-03-06 | Stop reason: HOSPADM

## 2024-03-05 RX ORDER — OMEPRAZOLE 20 MG/1
20 TABLET, DELAYED RELEASE ORAL DAILY
COMMUNITY
End: 2024-03-18

## 2024-03-05 RX ORDER — LIDOCAINE HYDROCHLORIDE 20 MG/ML
INJECTION, SOLUTION INFILTRATION; PERINEURAL
Status: COMPLETED
Start: 2024-03-05 | End: 2024-03-05

## 2024-03-05 RX ADMIN — PRASUGREL 60 MG: 10 TABLET, FILM COATED ORAL at 19:47

## 2024-03-05 RX ADMIN — HEPARIN SODIUM: 1000 INJECTION, SOLUTION INTRAVENOUS; SUBCUTANEOUS at 18:39

## 2024-03-05 RX ADMIN — MORPHINE SULFATE 4 MG: 4 INJECTION, SOLUTION INTRAMUSCULAR; INTRAVENOUS at 17:44

## 2024-03-05 RX ADMIN — HEPARIN SODIUM 4000 UNITS: 1000 INJECTION, SOLUTION INTRAVENOUS; SUBCUTANEOUS at 17:54

## 2024-03-05 RX ADMIN — FENTANYL CITRATE 100 MCG: 50 INJECTION, SOLUTION INTRAMUSCULAR; INTRAVENOUS at 19:37

## 2024-03-05 RX ADMIN — SODIUM CHLORIDE 3 ML/KG/HR: 9 INJECTION, SOLUTION INTRAVENOUS at 23:13

## 2024-03-05 RX ADMIN — IOHEXOL 113 ML: 350 INJECTION, SOLUTION INTRAVENOUS at 19:44

## 2024-03-05 RX ADMIN — NITROGLYCERIN 10 ML: 20 INJECTION INTRAVENOUS at 18:38

## 2024-03-05 RX ADMIN — ONDANSETRON 4 MG: 2 INJECTION INTRAMUSCULAR; INTRAVENOUS at 17:18

## 2024-03-05 RX ADMIN — HEPARIN SODIUM 2000 UNITS: 200 INJECTION, SOLUTION INTRAVENOUS at 18:38

## 2024-03-05 RX ADMIN — MIDAZOLAM HYDROCHLORIDE 2 MG: 2 INJECTION, SOLUTION INTRAMUSCULAR; INTRAVENOUS at 19:21

## 2024-03-05 RX ADMIN — HEPARIN SODIUM 12 UNITS/KG/HR: 5000 INJECTION, SOLUTION INTRAVENOUS at 17:54

## 2024-03-05 RX ADMIN — ATORVASTATIN CALCIUM 40 MG: 40 TABLET, FILM COATED ORAL at 23:09

## 2024-03-05 RX ADMIN — ADENOSINE 3 MG: 3 INJECTION INTRAVENOUS at 19:40

## 2024-03-05 RX ADMIN — VERAPAMIL HYDROCHLORIDE 2.5 MG: 2.5 INJECTION, SOLUTION INTRAVENOUS at 18:39

## 2024-03-05 RX ADMIN — LIDOCAINE HYDROCHLORIDE: 20 INJECTION, SOLUTION INFILTRATION; PERINEURAL at 18:39

## 2024-03-05 RX ADMIN — ASPIRIN 325 MG: 325 TABLET ORAL at 17:18

## 2024-03-05 RX ADMIN — MORPHINE SULFATE 4 MG: 4 INJECTION, SOLUTION INTRAMUSCULAR; INTRAVENOUS at 17:18

## 2024-03-05 ASSESSMENT — LIFESTYLE VARIABLES
ON A TYPICAL DAY WHEN YOU DRINK ALCOHOL HOW MANY DRINKS DO YOU HAVE: 6
AVERAGE NUMBER OF DAYS PER WEEK YOU HAVE A DRINK CONTAINING ALCOHOL: 1
TOTAL SCORE: 0
ALCOHOL_USE: YES
SUBSTANCE_ABUSE: 0
TOTAL SCORE: 0
HAVE PEOPLE ANNOYED YOU BY CRITICIZING YOUR DRINKING: NO
TOTAL SCORE: 0
CONSUMPTION TOTAL: NEGATIVE
DOES PATIENT WANT TO STOP DRINKING: CANNOT ASSESS
EVER FELT BAD OR GUILTY ABOUT YOUR DRINKING: NO
HOW MANY TIMES IN THE PAST YEAR HAVE YOU HAD 5 OR MORE DRINKS IN A DAY: 0
EVER HAD A DRINK FIRST THING IN THE MORNING TO STEADY YOUR NERVES TO GET RID OF A HANGOVER: NO
HAVE YOU EVER FELT YOU SHOULD CUT DOWN ON YOUR DRINKING: NO

## 2024-03-05 ASSESSMENT — ENCOUNTER SYMPTOMS
NECK PAIN: 0
ABDOMINAL PAIN: 1
PND: 0
CARDIOVASCULAR NEGATIVE: 1
BRUISES/BLEEDS EASILY: 0
DOUBLE VISION: 0
STRIDOR: 0
FLANK PAIN: 0
BLOOD IN STOOL: 0
CLAUDICATION: 0
MYALGIAS: 0
VOMITING: 0
FALLS: 0
EYES NEGATIVE: 1
DIZZINESS: 1
SPUTUM PRODUCTION: 0
CONSTITUTIONAL NEGATIVE: 1
CHILLS: 0
COUGH: 0
PAIN: 1
HEMOPTYSIS: 0
RESPIRATORY NEGATIVE: 1
PHOTOPHOBIA: 0
ORTHOPNEA: 0
BLOOD IN STOOL: 0
SINUS PAIN: 0
TINGLING: 0
HEARTBURN: 0
NAUSEA: 0
PALPITATIONS: 0
HEADACHES: 0
SHORTNESS OF BREATH: 1
WHEEZING: 0
BACK PAIN: 0
DIAPHORESIS: 0
DIARRHEA: 0
TREMORS: 0
BLURRED VISION: 0
EYE PAIN: 0
NAUSEA: 0
WEAKNESS: 0
HALLUCINATIONS: 0
ABDOMINAL PAIN: 0
VOMITING: 0
CONSTIPATION: 0
CONSTIPATION: 0
SORE THROAT: 0
POLYDIPSIA: 0
DEPRESSION: 0
HEARTBURN: 1
FEVER: 0
DIARRHEA: 0

## 2024-03-05 ASSESSMENT — PAIN DESCRIPTION - PAIN TYPE
TYPE: ACUTE PAIN
TYPE: ACUTE PAIN

## 2024-03-05 ASSESSMENT — COGNITIVE AND FUNCTIONAL STATUS - GENERAL
DAILY ACTIVITIY SCORE: 24
CLIMB 3 TO 5 STEPS WITH RAILING: A LITTLE
SUGGESTED CMS G CODE MODIFIER DAILY ACTIVITY: CH
MOBILITY SCORE: 23
SUGGESTED CMS G CODE MODIFIER MOBILITY: CI

## 2024-03-05 ASSESSMENT — PATIENT HEALTH QUESTIONNAIRE - PHQ9
1. LITTLE INTEREST OR PLEASURE IN DOING THINGS: NOT AT ALL
2. FEELING DOWN, DEPRESSED, IRRITABLE, OR HOPELESS: NOT AT ALL
SUM OF ALL RESPONSES TO PHQ9 QUESTIONS 1 AND 2: 0

## 2024-03-05 ASSESSMENT — FIBROSIS 4 INDEX
FIB4 SCORE: 2.34
FIB4 SCORE: 0.99
FIB4 SCORE: 0.99
FIB4 SCORE: 2.34

## 2024-03-05 NOTE — PROGRESS NOTES
"Subjective:   Yves Ivory is a 63 y.o. male who presents for Pain (X 5 days upper stomach pain. Pain from esophagus to stomach, hot flashes. Pt. States since last night very severe pain. )      Pain  Associated symptoms include abdominal pain. Pertinent negatives include no nausea or vomiting.       Review of Systems   Constitutional: Negative.    HENT: Negative.     Eyes: Negative.    Respiratory: Negative.     Cardiovascular: Negative.    Gastrointestinal:  Positive for abdominal pain and heartburn. Negative for blood in stool, constipation, diarrhea, melena, nausea and vomiting.   Genitourinary: Negative.    Skin: Negative.        Medications, Allergies, and current problem list reviewed today in Epic.     Objective:     BP (!) 140/90   Pulse 92   Temp 36.3 °C (97.3 °F) (Temporal)   Resp 16   Ht 1.803 m (5' 11\")   Wt 107 kg (236 lb)   SpO2 96%     Physical Exam  Vitals and nursing note reviewed.   Constitutional:       Appearance: Normal appearance.   HENT:      Head: Atraumatic.   Cardiovascular:      Rate and Rhythm: Normal rate and regular rhythm.      Pulses: Normal pulses.      Heart sounds: Normal heart sounds.   Pulmonary:      Effort: Pulmonary effort is normal.      Breath sounds: Normal breath sounds.   Abdominal:      General: Abdomen is flat. Bowel sounds are normal. There is no distension.      Palpations: Abdomen is soft. There is no mass.      Tenderness: There is abdominal tenderness. There is no right CVA tenderness, left CVA tenderness, guarding or rebound.      Hernia: No hernia is present.   Neurological:      Mental Status: He is alert.         Assessment/Plan:     Diagnosis and associated orders:     1. Acute gastritis without hemorrhage, unspecified gastritis type  sucralfate (CARAFATE) 1 GM Tab    H.PYLORI STOOL ANTIGEN         Comments/MDM:              Differential diagnosis, natural history, supportive care, and indications for immediate follow-up " discussed.    Advised the patient to follow-up with the primary care physician for recheck, reevaluation, and consideration of further management.    Please note that this dictation was created using voice recognition software. I have made a reasonable attempt to correct obvious errors, but I expect that there are errors of grammar and possibly content that I did not discover before finalizing the note.    This note was electronically signed by Graeme Hedrick M.D.

## 2024-03-05 NOTE — ED TRIAGE NOTES
Chief Complaint   Patient presents with    Abdominal Pain     Abd pain x 2 days. Upper mid abd pain/burning hx of ulcers. Pt has been taking OTC prilosec  Pt went to - given carafate with no relief       Pt to triage with steady gait for above complaint. Presents with upper mid abd pain, pt has been taking prilosec and carafate with no relief. Pt states at times he feels like the pain travels to his back.  Pt has hx of MI in 2016    Pt back to [facility], educated on triage process and encourage to alert staff of any changes.     Vitals:    03/05/24 1517   BP: (!) 132/92   Pulse:    Resp:    Temp:    SpO2:

## 2024-03-06 ENCOUNTER — APPOINTMENT (OUTPATIENT)
Dept: CARDIOLOGY | Facility: MEDICAL CENTER | Age: 64
DRG: 321 | End: 2024-03-06
Attending: HOSPITALIST
Payer: COMMERCIAL

## 2024-03-06 ENCOUNTER — PHARMACY VISIT (OUTPATIENT)
Dept: PHARMACY | Facility: MEDICAL CENTER | Age: 64
End: 2024-03-06
Payer: COMMERCIAL

## 2024-03-06 VITALS
TEMPERATURE: 98.1 F | OXYGEN SATURATION: 95 % | HEART RATE: 76 BPM | BODY MASS INDEX: 32.44 KG/M2 | SYSTOLIC BLOOD PRESSURE: 113 MMHG | RESPIRATION RATE: 18 BRPM | DIASTOLIC BLOOD PRESSURE: 76 MMHG | HEIGHT: 71 IN | WEIGHT: 231.7 LBS

## 2024-03-06 PROBLEM — Z95.5 STATUS POST PLACEMENT OF STENT IN RIGHT CORONARY ARTERY: Chronic | Status: ACTIVE | Noted: 2024-03-05

## 2024-03-06 LAB
ALBUMIN SERPL BCP-MCNC: 4.2 G/DL (ref 3.2–4.9)
ALBUMIN/GLOB SERPL: 1.7 G/DL
ALP SERPL-CCNC: 71 U/L (ref 30–99)
ALT SERPL-CCNC: 39 U/L (ref 2–50)
ANION GAP SERPL CALC-SCNC: 13 MMOL/L (ref 7–16)
AST SERPL-CCNC: 93 U/L (ref 12–45)
BASOPHILS # BLD AUTO: 0.3 % (ref 0–1.8)
BASOPHILS # BLD: 0.03 K/UL (ref 0–0.12)
BILIRUB SERPL-MCNC: 0.8 MG/DL (ref 0.1–1.5)
BUN SERPL-MCNC: 11 MG/DL (ref 8–22)
CALCIUM ALBUM COR SERPL-MCNC: 8.8 MG/DL (ref 8.5–10.5)
CALCIUM SERPL-MCNC: 9 MG/DL (ref 8.5–10.5)
CHLORIDE SERPL-SCNC: 99 MMOL/L (ref 96–112)
CHOLEST SERPL-MCNC: 105 MG/DL (ref 100–199)
CO2 SERPL-SCNC: 22 MMOL/L (ref 20–33)
CREAT SERPL-MCNC: 0.6 MG/DL (ref 0.5–1.4)
EKG IMPRESSION: NORMAL
EOSINOPHIL # BLD AUTO: 0.03 K/UL (ref 0–0.51)
EOSINOPHIL NFR BLD: 0.3 % (ref 0–6.9)
ERYTHROCYTE [DISTWIDTH] IN BLOOD BY AUTOMATED COUNT: 37.1 FL (ref 35.9–50)
GFR SERPLBLD CREATININE-BSD FMLA CKD-EPI: 109 ML/MIN/1.73 M 2
GLOBULIN SER CALC-MCNC: 2.5 G/DL (ref 1.9–3.5)
GLUCOSE BLD STRIP.AUTO-MCNC: 256 MG/DL (ref 65–99)
GLUCOSE BLD STRIP.AUTO-MCNC: 288 MG/DL (ref 65–99)
GLUCOSE SERPL-MCNC: 261 MG/DL (ref 65–99)
HCT VFR BLD AUTO: 41.3 % (ref 42–52)
HDLC SERPL-MCNC: 29 MG/DL
HGB BLD-MCNC: 14.5 G/DL (ref 14–18)
IMM GRANULOCYTES # BLD AUTO: 0.05 K/UL (ref 0–0.11)
IMM GRANULOCYTES NFR BLD AUTO: 0.5 % (ref 0–0.9)
LDLC SERPL CALC-MCNC: 38 MG/DL
LV EJECT FRACT MOD 2C 99903: 63.37
LV EJECT FRACT MOD 4C 99902: 58.02
LV EJECT FRACT MOD BP 99901: 59.85
LYMPHOCYTES # BLD AUTO: 1.97 K/UL (ref 1–4.8)
LYMPHOCYTES NFR BLD: 20.6 % (ref 22–41)
MCH RBC QN AUTO: 30.1 PG (ref 27–33)
MCHC RBC AUTO-ENTMCNC: 35.1 G/DL (ref 32.3–36.5)
MCV RBC AUTO: 85.7 FL (ref 81.4–97.8)
MONOCYTES # BLD AUTO: 1.19 K/UL (ref 0–0.85)
MONOCYTES NFR BLD AUTO: 12.4 % (ref 0–13.4)
NEUTROPHILS # BLD AUTO: 6.29 K/UL (ref 1.82–7.42)
NEUTROPHILS NFR BLD: 65.9 % (ref 44–72)
NRBC # BLD AUTO: 0 K/UL
NRBC BLD-RTO: 0 /100 WBC (ref 0–0.2)
PLATELET # BLD AUTO: 213 K/UL (ref 164–446)
PMV BLD AUTO: 9.9 FL (ref 9–12.9)
POTASSIUM SERPL-SCNC: 4.2 MMOL/L (ref 3.6–5.5)
PROT SERPL-MCNC: 6.7 G/DL (ref 6–8.2)
RBC # BLD AUTO: 4.82 M/UL (ref 4.7–6.1)
SODIUM SERPL-SCNC: 134 MMOL/L (ref 135–145)
TRIGL SERPL-MCNC: 189 MG/DL (ref 0–149)
WBC # BLD AUTO: 9.6 K/UL (ref 4.8–10.8)

## 2024-03-06 PROCEDURE — 99232 SBSQ HOSP IP/OBS MODERATE 35: CPT | Performed by: INTERNAL MEDICINE

## 2024-03-06 PROCEDURE — RXMED WILLOW AMBULATORY MEDICATION CHARGE: Performed by: INTERNAL MEDICINE

## 2024-03-06 PROCEDURE — 80053 COMPREHEN METABOLIC PANEL: CPT

## 2024-03-06 PROCEDURE — 82962 GLUCOSE BLOOD TEST: CPT

## 2024-03-06 PROCEDURE — 85025 COMPLETE CBC W/AUTO DIFF WBC: CPT

## 2024-03-06 PROCEDURE — RXMED WILLOW AMBULATORY MEDICATION CHARGE: Performed by: GENERAL PRACTICE

## 2024-03-06 PROCEDURE — 93306 TTE W/DOPPLER COMPLETE: CPT | Mod: 26 | Performed by: INTERNAL MEDICINE

## 2024-03-06 PROCEDURE — 700102 HCHG RX REV CODE 250 W/ 637 OVERRIDE(OP): Performed by: PHYSICIAN ASSISTANT

## 2024-03-06 PROCEDURE — 700102 HCHG RX REV CODE 250 W/ 637 OVERRIDE(OP): Performed by: INTERNAL MEDICINE

## 2024-03-06 PROCEDURE — 700117 HCHG RX CONTRAST REV CODE 255: Performed by: HOSPITALIST

## 2024-03-06 PROCEDURE — 700102 HCHG RX REV CODE 250 W/ 637 OVERRIDE(OP): Performed by: HOSPITALIST

## 2024-03-06 PROCEDURE — A9270 NON-COVERED ITEM OR SERVICE: HCPCS | Performed by: HOSPITALIST

## 2024-03-06 PROCEDURE — RXMED WILLOW AMBULATORY MEDICATION CHARGE: Performed by: PHYSICIAN ASSISTANT

## 2024-03-06 PROCEDURE — 93306 TTE W/DOPPLER COMPLETE: CPT

## 2024-03-06 PROCEDURE — A9270 NON-COVERED ITEM OR SERVICE: HCPCS | Performed by: PHYSICIAN ASSISTANT

## 2024-03-06 PROCEDURE — A9270 NON-COVERED ITEM OR SERVICE: HCPCS | Performed by: INTERNAL MEDICINE

## 2024-03-06 PROCEDURE — 93010 ELECTROCARDIOGRAM REPORT: CPT | Performed by: INTERNAL MEDICINE

## 2024-03-06 PROCEDURE — 97535 SELF CARE MNGMENT TRAINING: CPT

## 2024-03-06 PROCEDURE — 99239 HOSP IP/OBS DSCHRG MGMT >30: CPT | Performed by: GENERAL PRACTICE

## 2024-03-06 PROCEDURE — 80061 LIPID PANEL: CPT

## 2024-03-06 RX ORDER — INSULIN ASPART 100 [IU]/ML
2-9 INJECTION, SOLUTION INTRAVENOUS; SUBCUTANEOUS
Qty: 18 ML | Refills: 0 | Status: ACTIVE | OUTPATIENT
Start: 2024-03-06 | End: 2024-05-05

## 2024-03-06 RX ORDER — GLUCOSAMINE HCL/CHONDROITIN SU 500-400 MG
CAPSULE ORAL
Qty: 100 EACH | Refills: 0 | Status: SHIPPED | OUTPATIENT
Start: 2024-03-06

## 2024-03-06 RX ORDER — EMPAGLIFLOZIN 25 MG/1
25 TABLET, FILM COATED ORAL DAILY
Qty: 90 TABLET | Refills: 3 | Status: SHIPPED | OUTPATIENT
Start: 2024-03-06 | End: 2024-03-20 | Stop reason: SDUPTHER

## 2024-03-06 RX ORDER — LANCETS 30 GAUGE
EACH MISCELLANEOUS
Qty: 100 EACH | Refills: 0 | Status: SHIPPED | OUTPATIENT
Start: 2024-03-06

## 2024-03-06 RX ORDER — ATORVASTATIN CALCIUM 40 MG/1
40 TABLET, FILM COATED ORAL EVERY EVENING
Qty: 60 TABLET | Refills: 0 | Status: SHIPPED | OUTPATIENT
Start: 2024-03-06 | End: 2024-03-20 | Stop reason: SDUPTHER

## 2024-03-06 RX ORDER — DIPHENHYDRAMINE HYDROCHLORIDE 25 MG/1
CAPSULE, LIQUID FILLED ORAL
Qty: 1 KIT | Refills: 0 | Status: SHIPPED | OUTPATIENT
Start: 2024-03-06

## 2024-03-06 RX ORDER — PRASUGREL 10 MG/1
10 TABLET, FILM COATED ORAL DAILY
Qty: 90 TABLET | Refills: 0 | Status: SHIPPED | OUTPATIENT
Start: 2024-03-07 | End: 2024-03-20 | Stop reason: SDUPTHER

## 2024-03-06 RX ADMIN — HUMAN ALBUMIN MICROSPHERES AND PERFLUTREN 3 ML: 10; .22 INJECTION, SOLUTION INTRAVENOUS at 13:02

## 2024-03-06 RX ADMIN — METOPROLOL TARTRATE 25 MG: 25 TABLET, FILM COATED ORAL at 13:08

## 2024-03-06 RX ADMIN — PRASUGREL 10 MG: 10 TABLET, FILM COATED ORAL at 05:27

## 2024-03-06 RX ADMIN — INSULIN HUMAN 5 UNITS: 100 INJECTION, SOLUTION PARENTERAL at 11:07

## 2024-03-06 RX ADMIN — ASPIRIN 81 MG: 81 TABLET, COATED ORAL at 05:27

## 2024-03-06 RX ADMIN — ACETAMINOPHEN 650 MG: 325 TABLET, FILM COATED ORAL at 02:29

## 2024-03-06 RX ADMIN — LISINOPRIL 10 MG: 10 TABLET ORAL at 05:27

## 2024-03-06 RX ADMIN — INSULIN HUMAN 5 UNITS: 100 INJECTION, SOLUTION PARENTERAL at 08:37

## 2024-03-06 RX ADMIN — DILTIAZEM HYDROCHLORIDE 120 MG: 120 CAPSULE, COATED, EXTENDED RELEASE ORAL at 05:27

## 2024-03-06 ASSESSMENT — ENCOUNTER SYMPTOMS
ARTHRALGIAS: 0
SHORTNESS OF BREATH: 0
CHILLS: 0
EYES NEGATIVE: 1
DIZZINESS: 0
DIAPHORESIS: 0
CONSTIPATION: 0
DIARRHEA: 0
PSYCHIATRIC NEGATIVE: 1
LIGHT-HEADEDNESS: 0
BRUISES/BLEEDS EASILY: 0
CHEST TIGHTNESS: 0
PALPITATIONS: 0
NAUSEA: 0
FATIGUE: 0
ABDOMINAL DISTENTION: 0
VOMITING: 0
COLOR CHANGE: 0
MYALGIAS: 0
ENDOCRINE NEGATIVE: 1
ABDOMINAL PAIN: 0
COUGH: 0
FEVER: 0

## 2024-03-06 ASSESSMENT — COGNITIVE AND FUNCTIONAL STATUS - GENERAL
SUGGESTED CMS G CODE MODIFIER MOBILITY: CH
MOBILITY SCORE: 24

## 2024-03-06 ASSESSMENT — FIBROSIS 4 INDEX: FIB4 SCORE: 2.34

## 2024-03-06 ASSESSMENT — PAIN DESCRIPTION - PAIN TYPE: TYPE: ACUTE PAIN

## 2024-03-06 NOTE — PROGRESS NOTES
Pt transferred to floor by ANDRIA Alvarenga from PACU. Pt A&O4, Post-op vitals, on 2 L NC, and TR band in place. Pt updated on POC, all questions answered. Tele box on and monitor room notified. Pt oriented to room and educated to use call light for assistance.

## 2024-03-06 NOTE — ED PROVIDER NOTES
ED Provider Note    CHIEF COMPLAINT  Chief Complaint   Patient presents with    Abdominal Pain     Abd pain x 2 days. Upper mid abd pain/burning hx of ulcers. Pt has been taking OTC prilosec  Pt went to - given carafate with no relief       EXTERNAL RECORDS REVIEWED  Outpatient Notes cardiology visit 1/31/2024 for CAD status post 1 stent, type 2 diabetes, hyperlipidemia    HPI/ROS  LIMITATION TO HISTORY   Select: : None  OUTSIDE HISTORIAN(S):  Family wife    Yves Ivory is a 63 y.o. male who presents due to epigastric burning and aching pain since Saturday.  On 3/2 after the snowstorm the patient was shoveling snow when he began feeling epigastric pain.  He subsequently shoveled snow the next day and again was feeling continued epigastric pain.  He did not think anything of it, has been given sucralfate from an urgent care on today.  He has been going about his normal activity until this morning about 2 AM he began having 10 out of 10 epigastric pain radiating up to the chest and jaw that woke him up from sleep.  He felt associated diaphoresis with this pain.  Currently he reports his pain is 5 out of 10 and has been fluctuating throughout the day.  He notes this is not similar to his previous anginal type pain when he underwent a stent.  His last stress test was in 2021.  Currently notes no nausea diaphoresis or shortness of breath but still with epigastric pain/pressure.    PAST MEDICAL HISTORY   has a past medical history of Anxiety, Bronchitis, CAD (coronary artery disease), Chickenpox, Coronary heart disease, Depression, [place] measles, Hyperlipidemia, Hypertension, Influenza, Obesity, Sleep apnea, Ventricular tachycardia (HCC), and Whooping cough.    SURGICAL HISTORY   has a past surgical history that includes zzz cardiac cath; angioplasty; appendectomy child; and tonsillectomy.    FAMILY HISTORY  Family History   Problem Relation Age of Onset    Cancer Mother         breast cancer    Heart  "Disease Father 38        heart transplant, 3 MI's, multiple bypass    Stroke Maternal Grandmother        SOCIAL HISTORY  Social History     Tobacco Use    Smoking status: Former     Current packs/day: 0.00     Average packs/day: 1 pack/day for 40.0 years (40.0 ttl pk-yrs)     Types: Cigars, Cigarettes     Start date:      Quit date:      Years since quittin.1    Smokeless tobacco: Former     Types: Chew     Quit date:     Tobacco comments:     1 - 2 cigars on the weeknds    Vaping Use    Vaping Use: Never used   Substance and Sexual Activity    Alcohol use: Not Currently     Comment: 6-7/week    Drug use: Not Currently     Types: Marijuana    Sexual activity: Yes     Partners: Female       CURRENT MEDICATIONS  Home Medications       Reviewed by Rosina Andrew (Pharmacy Tech) on 24 at 1833  Med List Status: Complete     Medication Last Dose Status   acetaminophen (TYLENOL) 500 MG Tab 3/4/2024 Active   aspirin EC 81 MG EC tablet 3/5/2024 Active   Azelastine HCl (ASTEPRO NA) 3/4/2024 Active   Calcium Carbonate Antacid (SHAMA-SELTZER ANTACID PO) 3/4/2024 Active   diltiazem (TIAZAC) 120 MG SR capsule 3/5/2024 Active   diphenhydrAMINE-APAP, sleep, (TYLENOL PM EXTRA STRENGTH)  MG Tab 3/4/2024 Active   ezetimibe (ZETIA) 10 MG Tab 3/4/2024 Active   lisinopril (PRINIVIL) 10 MG Tab 3/5/2024 Active   NON SPECIFIED ABOUT A MONTH AGO Active   omeprazole (PRILOSEC OTC) 20 MG tablet 3/5/2024 Active   rosuvastatin (CRESTOR) 40 MG tablet 3/4/2024 Active   sucralfate (CARAFATE) 1 GM Tab 3/5/2024 Active                    ALLERGIES  Allergies   Allergen Reactions    Brilinta [Ticagrelor] Shortness of Breath     Med put patient in ER twice       PHYSICAL EXAM  VITAL SIGNS: /84   Pulse 81   Temp 36.3 °C (97.4 °F) (Temporal)   Resp 20   Ht 1.803 m (5' 11\")   Wt 107 kg (234 lb 12.6 oz)   SpO2 93%   BMI 32.75 kg/m²    Constitutional: Awake and alert. No acute distress.  Head: NCAT.  HEENT: " Normal Conjunctiva. PERRLA.  Neck: Grossly normal range of motion. Airway midline.  Cardiovascular: Normal heart rate, Normal rhythm.  Thorax & Lungs: No respiratory distress. Clear to Auscultation bilaterally.  Abdomen: Normal inspection. Nontender. Nondistended  Skin: No obvious rash.  Back: No tenderness, No CVA tenderness.   Musculoskeletal: No obvious deformity. Moves all extremities Well.  Neurologic: A&Ox3.   Psychiatric: Mood and affect are appropriate for situation.    DIAGNOSTIC STUDIES / PROCEDURES  EKG  I have independently interpreted this EKG  EKG performed at 15: 26 which I am reviewing at 15: 05 demonstrates sinus rhythm 88 bpm.  There are some elevations in lead III and lead aVF.  He has QRS widening of 124.  Historically has EKGs in 2016 have presented similarly with respect to the inferior elevation findings.    LABS  Results for orders placed or performed during the hospital encounter of 03/05/24   COMP METABOLIC PANEL   Result Value Ref Range    Sodium 134 (L) 135 - 145 mmol/L    Potassium 4.2 3.6 - 5.5 mmol/L    Chloride 96 96 - 112 mmol/L    Co2 22 20 - 33 mmol/L    Anion Gap 16.0 7.0 - 16.0    Glucose 246 (H) 65 - 99 mg/dL    Bun 11 8 - 22 mg/dL    Creatinine 0.72 0.50 - 1.40 mg/dL    Calcium 10.1 8.5 - 10.5 mg/dL    Correct Calcium 9.4 8.5 - 10.5 mg/dL    AST(SGOT) 68 (H) 12 - 45 U/L    ALT(SGPT) 44 2 - 50 U/L    Alkaline Phosphatase 91 30 - 99 U/L    Total Bilirubin 1.0 0.1 - 1.5 mg/dL    Albumin 4.9 3.2 - 4.9 g/dL    Total Protein 7.9 6.0 - 8.2 g/dL    Globulin 3.0 1.9 - 3.5 g/dL    A-G Ratio 1.6 g/dL   LIPASE   Result Value Ref Range    Lipase 34 11 - 82 U/L   URINALYSIS    Specimen: Blood   Result Value Ref Range    Color Yellow     Character Clear     Specific Gravity 1.039 <1.035    Ph 5.0 5.0 - 8.0    Glucose >=1000 (A) Negative mg/dL    Ketones 40 (A) Negative mg/dL    Protein 100 (A) Negative mg/dL    Bilirubin Negative Negative    Urobilinogen, Urine 0.2 Negative    Nitrite  Negative Negative    Leukocyte Esterase Negative Negative    Occult Blood Trace (A) Negative    Micro Urine Req Microscopic    CBC with Differential   Result Value Ref Range    WBC 9.2 4.8 - 10.8 K/uL    RBC 5.78 4.70 - 6.10 M/uL    Hemoglobin 17.1 14.0 - 18.0 g/dL    Hematocrit 48.8 42.0 - 52.0 %    MCV 84.4 81.4 - 97.8 fL    MCH 29.6 27.0 - 33.0 pg    MCHC 35.0 32.3 - 36.5 g/dL    RDW 35.6 (L) 35.9 - 50.0 fL    Platelet Count 267 164 - 446 K/uL    MPV 10.1 9.0 - 12.9 fL    Neutrophils-Polys 67.50 44.00 - 72.00 %    Lymphocytes 23.70 22.00 - 41.00 %    Monocytes 7.70 0.00 - 13.40 %    Eosinophils 0.50 0.00 - 6.90 %    Basophils 0.30 0.00 - 1.80 %    Immature Granulocytes 0.30 0.00 - 0.90 %    Nucleated RBC 0.00 0.00 - 0.20 /100 WBC    Neutrophils (Absolute) 6.19 1.82 - 7.42 K/uL    Lymphs (Absolute) 2.18 1.00 - 4.80 K/uL    Monos (Absolute) 0.71 0.00 - 0.85 K/uL    Eos (Absolute) 0.05 0.00 - 0.51 K/uL    Baso (Absolute) 0.03 0.00 - 0.12 K/uL    Immature Granulocytes (abs) 0.03 0.00 - 0.11 K/uL    NRBC (Absolute) 0.00 K/uL   Troponins NOW   Result Value Ref Range    Troponin T 360 (H) 6 - 19 ng/L   Troponins in two (2) hours   Result Value Ref Range    Troponin T 319 (H) 6 - 19 ng/L   ESTIMATED GFR   Result Value Ref Range    GFR (CKD-EPI) 104 >60 mL/min/1.73 m 2   aPTT   Result Value Ref Range    APTT 23.8 (L) 24.7 - 36.0 sec   Prothrombin Time   Result Value Ref Range    PT 13.7 12.0 - 14.6 sec    INR 1.03 0.87 - 1.13   Heparin Xa (Unfractionated)   Result Value Ref Range    Heparin Xa (UFH) <0.10 IU/mL   URINE MICROSCOPIC (W/UA)   Result Value Ref Range    WBC 0-2 (A) /hpf    RBC 0-2 (A) /hpf    Bacteria Negative None /hpf    Epithelial Cells Negative /hpf    Hyaline Cast 0-2 /lpf   EKG   Result Value Ref Range    Report       [facility] Emergency Dept.    Test Date:  2024-03-05  Pt Name:    MICKEY CARVAJAL            Department: ER  MRN:        [id number]                      Room:  Gender:      Male                         Technician: [id number]  :        1960                   Requested By:ER TRIAGE PROTOCOL  Order #:    [id number]                    Reading MD:    Measurements  Intervals                                Axis  Rate:       88                           P:          47  OK:         181                          QRS:        31  QRSD:       124                          T:          18  QT:         356  QTc:        431    Interpretive Statements  Sinus rhythm  Nonspecific intraventricular conduction delay  Minimal ST elevation, inferior leads  Compared to ECG 10/06/2016 22:35:37  ST (T wave) deviation now present     POCT activated clotting time device results   Result Value Ref Range    Istat Activated Clotting Time 223 (H) 74 - 137 sec   POCT activated clotting time device results   Result Value Ref Range    Istat Activated Clotting Time 331 (H) 74 - 137 sec         RADIOLOGY  I have independently interpreted the diagnostic imaging associated with this visit and am waiting the final reading from the radiologist.   My preliminary interpretation is as follows: no opacity, no effusion  Radiologist interpretation:   DX-CHEST-PORTABLE (1 VIEW)   Final Result      No acute cardiopulmonary abnormality.         CL-LEFT HEART CATHETERIZATION WITH POSSIBLE INTERVENTION    (Results Pending)   EC-ECHOCARDIOGRAM COMPLETE W/O CONT    (Results Pending)         COURSE & MEDICAL DECISION MAKING    ED Observation Status? No; Patient does not meet criteria for ED Observation.     INITIAL ASSESSMENT, COURSE AND PLAN  Care Narrative:   63-year-old male history of cad s/p 1 prior stent, diabetes, hypertension, hyperlipidemia presents due to 3 days of epigastric chest pain acutely worsening at 2 AM this morning that had radiation to the jaw.  Afebrile, tachycardic, hypertensive on arrival  On exam no murmur, clear lungs, no acute distress, soft nontender abdomen.  Labs and EKG had been ordered by triage  protocol.  Patient was roomed once troponin was found to be 360.  This is the time I am assessing him at 15: 05.  He does have inferior elevations without reciprocal changes and these are similar to EKG in 2016.  We will treat his chest pain with morphine, provide aspirin and consult cardiology.  Did not activate STEMI due to prolonged nature of symptoms, EKG changes which have been present in the past.  Labs otherwise notable for hyperglycemia.  Patient is evaluated at bedside by cardiology.  After phone conversation I did initiate heparin, aspirin.  Patient reports no adverse reaction to nitroglycerin and 2016 with his heart stent placement and therefore we will defer at this time.  Patient ultimately taken to Cath Lab and then will be admitted to the hospitalist post cath.    HTN/IDDM FOLLOW UP:  The patient has known hypertension and is being followed by their primary care doctor      CRITICAL CARE  The very real possibilty of a deterioration of this patient's condition required the highest level of my preparedness for sudden, emergent intervention.  I provided critical care services, which included medication orders, frequent reevaluations of the patient's condition and response to treatment, ordering and reviewing test results, and discussing the case with various consultants.  The critical care time associated with the care of the patient was 31 minutes. Review chart for interventions. This time is exclusive of any other billable procedures.       ADDITIONAL PROBLEM LIST    Hypertension  DMII  CAD s/p 1 stent  DISPOSITION AND DISCUSSIONS  I have discussed management of the patient with the following physicians and TEJAS's:    Cardiology - to cath lab  Hospitalist - admission post cath    Discussion of management with other QHP or appropriate source(s): None     Barriers to care at this time, including but not limited to: Patient lacks financial resources.     Decision tools and prescription drugs considered  including, but not limited to: HEART Score 6 .    FINAL DIAGNOSIS  1. NSTEMI (non-ST elevated myocardial infarction) (HCC)    2. Type 2 diabetes mellitus with other specified complication, unspecified whether long term insulin use (HCC)    3. Primary hypertension    4. Hyperglycemia           Electronically signed by: Rm Ray D.O., 3/5/2024 5:03 PM

## 2024-03-06 NOTE — PROCEDURES
Cardiac Catheterization and Percutaneous Intervention Procedure Report    3/5/2024      Primary Care Provider: Stormy Irene D.N.P.    Indication for procedure: Acute inferior MI    Procedures:  right and left coronary arteriograms  Left heart catheterization and Left ventriculogram  Angioplasty and placement of 3.0 x 12, 2.75 x 12 mm Synergy drug-eluting stents in distal RCA.    Final diagnosis:   single vessel coronary artery disease.  Successful percutaneous intervention of right coronary artery.    Recommendations: Guideline directed medical therapy and risk factor management      Coronary arteriograms:  Left main: Mild luminal irregularities, no significant stenosis.  Left anterior descending: Calcified proximal vessel.  Diffuse mild disease.  Apical LAD has focal 60% stenosis.  Diagonals are small vessels, no significant stenosis.  Left circumflex: Gives 2 marginal branches.  Large first marginal branch has prior stent, which is widely patent with good runoff.  No significant disease in left circumflex system.  Right coronary: 99% distal stenosis with STEPHENIE I flow, lesion just prior to bifurcation, dominant    Left Heart Catheterization:  Left Ventriculogram: 55%, inferior wall hypokinesis  Left Ventricular EDP: 14 mm Hg   Aortic Valve Gradient: No significant AV gradient noted    Procedure details:  Yevs Ivory was brought to the cardiac catheterization lab where the right wrist was prepped and draped in the usual manner for cardiac catheterization.  Timeout was performed.  The area was anesthetized with lidocaine and a 5/6 FR sheath was inserted into the right radial artery without difficulty. A #3.5 left Adrianna catheter was advanced to the ostia of the Left coronary artery and arteriograms were recorded.   A #4 right Adrianna catheter was advanced to the ostia of the right coronary artery and arteriograms were recorded. Aortic valve was crossed using #4 right Adrianna catheter left heart  "catheterization and left ventriculogram were performed.  Patient underwent percutaneous coronary revascularization as outlined below.  At the completion of the case the sheath was removed and hemostasis achieved utilizing a radial compression band .  Patient was pain-free and hemodynamically stable at the completion of the case.  There were no apparent complications.    Interventional Procedure RCA:     Given the patient's clinical presentation and coronary anatomy, PCI was indicated and we proceeded with the intervention as detailed below.    Indication for PCI:  NSTE- ACD    Pre: 99%, 10 mm length, STEPHENIE 1 flow  Post: 0%, STEPHENIE 3 flow    Lesion complexity  Non-High  Severe calcification No  Bifurcation  yes    Guide catheter: AR1 was advanced to the ostia of the right coronary artery.    Guide wire: A 0.014\" mm  Runthrough was advanced into the artery and crossed the lesion.    Balloon pre-dilatation: 2.5 by 20 mm Emerge inflated to 10 HUAN to pre-dilate the lesion.    3.0 x 12 mm Synergy drug-eluting stent was advanced, deployed in distal RCA but patient took a big breath just prior to stent implantation, stent landed proximal to the lesion.  Then a 2.75 x 12 mm Synergy drug-eluting stent was placed distal to the prior stent in overlapping fashion covering the lesion and extending into PDA, deployed at 14 huan pressures with good result.  Then proximal portion of the stent was dilated using 3.5 x 15 mm NC balloon at 16 huan pressures with good result.  Posterolateral branch was jailed with 80% stenosis ostially, this was recrossed, dilated using 2.0 x 12 mm balloon with good result residual stenosis less than 50%.  There was slow flow initially but improved with intracoronary nitroglycerin, adenosine.  At the end STEPHENIE-3 flow into PDA, PL branches.    Anticoagulant: Heparin  Antiplatelet: Prasugrel  EBL <25 cc  Complications: none  Specimens: none  Contrast: 113 cc  Fluorotime : see cath lab flowsheet      Sedation: I " supervised moderate sedation over a trained independent observer.    Sedation start time: 19:02  End time: 19:41      Electronically signed by   Delvis Cespedes M.D., MANISH  Interventional cardiologist  3/5/2024  7:48 PM

## 2024-03-06 NOTE — HOSPITAL COURSE
This is a 63-year-old male with past medical history of hypertension, dyslipidemia, uncontrolled type 2 diabetes A1c 10.1, CAD s/p PCI, history of V-fib arrest, and obesity who presented to the ED on 3/5/2024 with chest pain, significant for NSTEMI.    EKG NSR, Q waves in inferior leads, no acute ischemia.  Chest x-ray with evidence of mild pulmonary edema, no evidence of pulmonary infiltrate. Troponin peak of 360. Cardiology consulted, patient underwent cardiac catheterization on 3/5, noted 99% distal stenosis in the RCA, with successful PCI x2. TTE noted LVEF of 60%, mild MR.    Patient noted to be in uncontrolled type II diabetic A1c of 10.1, patient was formally on Jardiance several years ago, off this medication due to cost.  Patient seen prior to discharge by dietitian and diabetic educator to instruct patient how to use insulin. Encourage patient to keep track of his blood sugars, presented to his primary care physician who can further titrate his medications.  All diabetic supplies  sent to [facility] pharmacy.

## 2024-03-06 NOTE — H&P
Hospital Medicine History & Physical Note    Date of Service  3/5/2024    Primary Care Physician  Stormy Irene D.N.P.    Consultants    Specialist Names:     Code Status  Full Code    Chief Complaint  Chief Complaint   Patient presents with    Abdominal Pain     Abd pain x 2 days. Upper mid abd pain/burning hx of ulcers. Pt has been taking OTC prilosec  Pt went to - given carafate with no relief       History of Presenting Illness  Yves Ivory is a 63 y.o. male who presented 3/5/2024 with past medical history of coronary disease status post stent 2016, history of V-fib arrest, diabetes, sleep apnea, hypertension who presents to the hospital for epigastric abdominal pain.  The pain radiates to his chest and then up to his throat.  This pain started 4 days ago.  The chest pain is exertional but nonpositional.  When the started to occur he would feel short of breath, nauseated, lightheaded and dizzy.  Patient is compliant with his home heart medications.  He did visit urgent care today and tried to take Carafate without any relief that prompted him to come to emergency room.  The patient does have a history of diabetes and his last hemoglobin A1c was 7.1 but is currently not on any medications.  Patient does have a longstanding history of sleep apnea but is not compliant with his home CPAP machine.    Chest x-ray interpreted by me found mild acute pulmonary edema  EKG interpreted by me found normal sinus rhythm, Q waves in inferior leads, left axis deviation, poor R wave progression.  Without significant ST segment changes    I discussed the plan of care with .    Review of Systems  Review of Systems   Constitutional:  Negative for chills, diaphoresis, fever and malaise/fatigue.   HENT:  Negative for congestion, ear discharge, ear pain, hearing loss, nosebleeds, sinus pain, sore throat and tinnitus.    Eyes:  Negative for blurred vision, double vision, photophobia and pain.   Respiratory:  Positive for  shortness of breath. Negative for cough, hemoptysis, sputum production, wheezing and stridor.    Cardiovascular:  Positive for chest pain. Negative for palpitations, orthopnea, claudication, leg swelling and PND.   Gastrointestinal:  Negative for abdominal pain, blood in stool, constipation, diarrhea, heartburn, melena, nausea and vomiting.   Genitourinary:  Negative for dysuria, flank pain, frequency, hematuria and urgency.   Musculoskeletal:  Negative for back pain, falls, joint pain, myalgias and neck pain.   Skin:  Negative for itching and rash.   Neurological:  Positive for dizziness. Negative for tingling, tremors, weakness and headaches.   Endo/Heme/Allergies:  Negative for environmental allergies and polydipsia. Does not bruise/bleed easily.   Psychiatric/Behavioral:  Negative for depression, hallucinations, substance abuse and suicidal ideas.        Past Medical History   has a past medical history of Anxiety, Bronchitis, CAD (coronary artery disease), Chickenpox, Coronary heart disease, Depression, [place] measles, Hyperlipidemia, Hypertension, Influenza, Obesity, Sleep apnea, Ventricular tachycardia (HCC), and Whooping cough.    Surgical History   has a past surgical history that includes zzz cardiac cath; angioplasty; appendectomy child; and tonsillectomy.     Family History  family history includes Cancer in his mother; Heart Disease (age of onset: 38) in his father; Stroke in his maternal grandmother.   Family history reviewed with patient. There is no family history that is pertinent to the chief complaint.     Social History   reports that he quit smoking about 8 years ago. His smoking use included cigars and cigarettes. He started smoking about 48 years ago. He has a 40 pack-year smoking history. He quit smokeless tobacco use about 8 years ago.  His smokeless tobacco use included chew. He reports that he does not currently use alcohol. He reports that he does not currently use drugs after having used  the following drugs: Marijuana.    Allergies  Allergies   Allergen Reactions    Brilinta [Ticagrelor] Shortness of Breath     Med put patient in ER twice       Medications  Prior to Admission Medications   Prescriptions Last Dose Informant Patient Reported? Taking?   Azelastine HCl (ASTEPRO NA) 3/4/2024 at PM Patient Yes Yes   Sig: Administer 2 Sprays into each nostril at bedtime.   Calcium Carbonate Antacid (SHAMA-SELTZER ANTACID PO) 3/4/2024 at PM Patient Yes Yes   Sig: Take 1 Tablet by mouth at bedtime.   NON SPECIFIED ABOUT A MONTH AGO at [facility] Patient Yes No   Sig: Allergy shots every month with Dr. Rae   acetaminophen (TYLENOL) 500 MG Tab 3/4/2024 at PM Patient Yes Yes   Sig: Take 500 mg by mouth every evening.   aspirin EC 81 MG EC tablet 3/5/2024 at AM Patient No Yes   Sig: Take 1 Tab by mouth every day.   diltiazem (TIAZAC) 120 MG SR capsule 3/5/2024 at AM Patient No Yes   Sig: Take 1 Capsule by mouth every day.   diphenhydrAMINE-APAP, sleep, (TYLENOL PM EXTRA STRENGTH)  MG Tab 3/4/2024 at PM Patient Yes Yes   Sig: Take 2 Tablets by mouth at bedtime.   ezetimibe (ZETIA) 10 MG Tab 3/4/2024 at PM Patient No Yes   Sig: Take 1 Tablet by mouth every day.   lisinopril (PRINIVIL) 10 MG Tab 3/5/2024 at AM Patient No Yes   Sig: Take 1 Tablet by mouth 2 times a day.   omeprazole (PRILOSEC OTC) 20 MG tablet 3/5/2024 at 0400 Patient Yes Yes   Sig: Take 20 mg by mouth every day.   rosuvastatin (CRESTOR) 40 MG tablet 3/4/2024 at PM Patient No Yes   Sig: Take 1 Tablet by mouth every day.   sucralfate (CARAFATE) 1 GM Tab 3/5/2024 at AFTERNOON Patient No Yes   Sig: Take 1 Tablet by mouth 4 Times a Day,Before Meals and at Bedtime.      Facility-Administered Medications: None       Physical Exam  Temp:  [35.8 °C (96.5 °F)-36.3 °C (97.3 °F)] 35.8 °C (96.5 °F)  Pulse:  [] 74  Resp:  [14-18] 18  BP: (132-160)/(87-98) 142/90  SpO2:  [94 %-96 %] 94 %  Blood Pressure: (!) 142/90   Temperature: 35.8 °C (96.5 °F)   Pulse:  "74   Respiration: 18   Pulse Oximetry: 94 %       Physical Exam  Vitals and nursing note reviewed.   Constitutional:       General: He is not in acute distress.     Appearance: Normal appearance. He is not ill-appearing, toxic-appearing or diaphoretic.   HENT:      Head: Normocephalic and atraumatic.      Nose: No congestion or rhinorrhea.      Mouth/Throat:      Pharynx: No posterior oropharyngeal erythema.   Eyes:      General: No scleral icterus.        Right eye: No discharge.   Cardiovascular:      Rate and Rhythm: Normal rate and regular rhythm.      Pulses: Normal pulses.      Heart sounds: Normal heart sounds. No murmur heard.     No friction rub. No gallop.   Pulmonary:      Effort: Pulmonary effort is normal. No respiratory distress.      Breath sounds: Normal breath sounds. No stridor. No wheezing, rhonchi or rales.   Abdominal:      General: There is no distension.      Tenderness: There is no abdominal tenderness.   Musculoskeletal:         General: No swelling, tenderness, deformity or signs of injury.      Cervical back: Normal range of motion.      Right lower leg: No edema.      Left lower leg: No edema.   Skin:     Capillary Refill: Capillary refill takes more than 3 seconds.      Coloration: Skin is not jaundiced or pale.      Findings: No bruising, erythema, lesion or rash.   Neurological:      General: No focal deficit present.      Mental Status: He is alert and oriented to person, place, and time.         Laboratory:  Recent Labs     03/05/24  1523   WBC 9.2   RBC 5.78   HEMOGLOBIN 17.1   HEMATOCRIT 48.8   MCV 84.4   MCH 29.6   MCHC 35.0   RDW 35.6*   PLATELETCT 267   MPV 10.1     Recent Labs     03/05/24  1523   SODIUM 134*   POTASSIUM 4.2   CHLORIDE 96   CO2 22   GLUCOSE 246*   BUN 11   CREATININE 0.72   CALCIUM 10.1     Recent Labs     03/05/24  1523   ALTSGPT 44   ASTSGOT 68*   ALKPHOSPHAT 91   TBILIRUBIN 1.0   LIPASE 34   GLUCOSE 246*         No results for input(s): \"NTPROBNP\" in the " last 72 hours.      Recent Labs     03/05/24  1523 03/05/24  1714   TROPONINT 360* 319*       Imaging:  DX-CHEST-PORTABLE (1 VIEW)   Final Result      No acute cardiopulmonary abnormality.         CL-LEFT HEART CATHETERIZATION WITH POSSIBLE INTERVENTION    (Results Pending)   EC-ECHOCARDIOGRAM COMPLETE W/O CONT    (Results Pending)       X-Ray:  I have personally reviewed the images and compared with prior images.  EKG:  I have personally reviewed the images and compared with prior images.    Assessment/Plan:  Justification for Admission Status  I anticipate this patient will require at least two midnights for appropriate medical management, necessitating inpatient admission because NSTEMI    Patient will need a Telemetry bed on MEDICAL service .  The need is secondary to NSTEMI.    * NSTEMI (non-ST elevated myocardial infarction) (HCC)- (present on admission)  Assessment & Plan  Patient will be admitted to the telemetry unit with close cardiac monitoring, serial EKG and troponin  Patient has been given full dose of aspirin and is started on IV heparin, monitor APTT  Continue home diltiazem  Atorvastatin 40 mg  Check 2D echo, lipid panel, TSH and hemoglobin A1c  Nitro and morphine when necessary for chest pain  Cardiology has been consulted      Essential hypertension- (present on admission)  Assessment & Plan  Uncontrolled  Continue current home medications  IV as needed medications have been ordered      ROSALINO (obstructive sleep apnea)- (present on admission)  Assessment & Plan  Education on home CPAP    Type II diabetes mellitus with complication (HCC)- (present on admission)  Assessment & Plan  Uncontrolled  Start on insulin sliding scale with serial Accu-Checks  Check hemoglobin A1c  Hypoglycemic protocol in place  He will need oral hypoglycemics prior to discharge        VTE prophylaxis: SCDs/TEDs      Patient is critically ill.   The patient continues to have: NSTEMI  The vital organ system that is affected is  the: Cardiac  If untreated there is a high chance of deterioration into: Cardiac arrest  And eventually death.   The critical care that I am providing today is: Heparin and medical management of NSTEMI  The critical that has been undertaken is medically complex.   There has been no overlap in critical care time.   Critical Care Time not including procedures: 60 minutes

## 2024-03-06 NOTE — ED NOTES
Pt ambulatory from triage. An available ERP notified of pt's critical troponin level. ERP at bedside

## 2024-03-06 NOTE — DIETARY
Nutrition Services: Diabetes and Heart Healthy Diet Education Consult   Day 1 of admit.  Yves Ivory is a 63 y.o. male with admitting DX of NSTEMI (non-ST elevated myocardial infarction) (HCC) [I21.4]    RD able to visit pt and pt's wife at bedside to provide DM and heart healthy diet education. RD discussed CHO sources, MyPlate, CHO counting, nutrition label reading, high protein snack ideas, macronutrients, pairing CHO w/ protein, unsaturated fat vs saturated fat, and role of fiber in the diet. RD provided handout reinforcing topics discussed. Pt and wife were eager to learn and demonstrated  readiness and evidence of learning. RD able to answer all questions to patient's satisfaction.     No other education needs identified at this time. Consider referral to outpatient nutrition services for continuation of education as indicated or per pt preferences.     Please re-consult RD as indicated.

## 2024-03-06 NOTE — PROGRESS NOTES
Patient recovered well post op. A&Ox4. VSS, room air. Surgical sites CDI. Surgical pain managed. TR band as ordered in the PACU Patient able to drink fluids without Nausea and vomiting. PT belongings on the bed claimed by the patient. Spouse updated and discussed plan of care. Report called to Radha AYERS. Patient transported with this RN on a monitor.

## 2024-03-06 NOTE — DISCHARGE SUMMARY
Discharge Summary    CHIEF COMPLAINT ON ADMISSION  Chief Complaint   Patient presents with    Abdominal Pain     Abd pain x 2 days. Upper mid abd pain/burning hx of ulcers. Pt has been taking OTC prilosec  Pt went to - given carafate with no relief       Reason for Admission  Abd Pain     Admission Date  3/5/2024    CODE STATUS  Full Code    HPI & HOSPITAL COURSE  This is a 63-year-old male with past medical history of hypertension, dyslipidemia, uncontrolled type 2 diabetes A1c 10.1, CAD s/p PCI, history of V-fib arrest, and obesity who presented to the ED on 3/5/2024 with chest pain, significant for NSTEMI.    EKG NSR, Q waves in inferior leads, no acute ischemia.  Chest x-ray with evidence of mild pulmonary edema, no evidence of pulmonary infiltrate. Troponin peak of 360. Cardiology consulted, patient underwent cardiac catheterization on 3/5, noted 99% distal stenosis in the RCA, with successful PCI x2. TTE noted LVEF of 60%, mild MR.    Patient noted to be in uncontrolled type II diabetic A1c of 10.1, patient was formally on Jardiance several years ago, off this medication due to cost.  Patient seen prior to discharge by dietitian and diabetic educator to instruct patient how to use insulin. Encourage patient to keep track of his blood sugars, presented to his primary care physician who can further titrate his medications.  All diabetic supplies  sent to [facility] pharmacy.    Therefore, he is discharged in good and stable condition to home with close outpatient follow-up.    The patient met 2-midnight criteria for an inpatient stay at the time of discharge.    Discharge Date  3/6/2024    FOLLOW UP ITEMS POST DISCHARGE  Primary care physician  Endocrinologist  Cardiology    DISCHARGE DIAGNOSES  Principal Problem:    NSTEMI (non-ST elevated myocardial infarction) (HCC) (POA: Yes)  Active Problems:    Type II diabetes mellitus with complication (HCC) (POA: Yes)      Overview: Taking Jardiance 10mg QD            GFR  102; microalbum creat ratio 24      HTN managed on Lisinopril 10mg BID, Cardizem 120mg      Dyslipidemia managed on Rosuvastatin 40mg, Zetia 10mg       Normal monofilament 4/2022      [place]opathy; was told he is good       for a few years. Declined screen today     ROSALINO (obstructive sleep apnea) (POA: Yes)      Overview: Reports did not tolerate Bipap at home; he is using nasal apfrin & breath       rite strips nightly with relief    Essential hypertension (POA: Yes)      Overview: Followed by cardiology; Hx MI with stents      Taking Lisinopril 10mg BID, Dilitiazem 10mg QD  Resolved Problems:    * No resolved hospital problems. *      FOLLOW UP  Future Appointments   Date Time Provider Department Center   3/20/2024  3:30 PM [facility] None   4/4/2024  8:40 AM SUPA Guevara.N.P. [facility] ER[facility] GipsonN.P.  [address] [facility] [address]  [phone]    Follow up      [facility]  [address]  [facility] [address]  [phone]  Follow up        MEDICATIONS ON DISCHARGE     Medication List        START taking these medications        Instructions   Alcohol Swabs Pads   Doctor's comments: Per formulary preference. ICD-10 code: E11.65 Uncontrolled type 2 Diabetes Mellitus  Wipe site with prep pad prior to injection.     atorvastatin 40 MG Tabs  Commonly known as: Lipitor   Take 1 Tablet by mouth every evening for 60 days.  Dose: 40 mg     BD Pen Needle Yaz U/F  Generic drug: Insulin Pen Needle 32 G x 4 mm   Doctor's comments: Per patient/formulary preference. ICD-10 code: E11.65 Uncontrolled type 2 Diabetes Mellitus  Use one pen needle in pen device to inject insulin three times daily.     Jardiance 25 MG Tabs  Generic drug: Empagliflozin   Take 1 tablet by mouth every day.  Dose: 25 mg     metoprolol tartrate 25 MG Tabs  Commonly known as: Lopressor   Take 1 Tablet by mouth 2 times a day.  Dose: 25 mg     NovoLOG FlexPen  100 UNIT/ML solution for injection  Generic drug: insulin aspart   Inject 2-9 Units under the skin 3 times a day before meals for 60 days. 70   - 150  mg/dL =      0 Units  151 - 200  mg/dL =    2 Units  201 - 250  mg/dL =    3 Units  251 - 300  mg/dL  =   5 Units  301 - 350  mg/dL  =   6 Units  351 - 400 mg/dL   =   8 Units  Over 400 mg/dL   =   9 Units  Dose: 2-9 Units     OneTouch Delica Plus [id number] Misc   Doctor's comments: Or per formulary preference. ICD-10 code: E11.65 Uncontrolled type 2 Diabetes Mellitus  Use one lancet to test blood sugar three times daily before meals.     OneTouch Verio Flex System w/Device Kit   Doctor's comments: Or per formulary preference. ICD-10 code: E11.65 Uncontrolled type 2 Diabetes Mellitus  Test blood sugar as recommended by provider.     OneTouch Verio strip  Generic drug: glucose blood   Doctor's comments: Or per formulary preference. ICD-10 code: E11.65 Uncontrolled type 2 Diabetes Mellitus  Use one strip to test blood sugar three times daily before meals.     prasugrel 10 MG Tabs  Start taking on: March 7, 2024  Commonly known as: Effient   Take 1 Tablet by mouth every day for 90 days.  Dose: 10 mg            CONTINUE taking these medications        Instructions   acetaminophen 500 MG Tabs  Commonly known as: Tylenol   Take 500 mg by mouth every evening.  Dose: 500 mg     SHAMA-SELTZER ANTACID PO   Take 1 Tablet by mouth at bedtime.  Dose: 1 Tablet     aspirin 81 MG EC tablet   Take 1 Tab by mouth every day.  Dose: 81 mg     ASTEPRO NA   Administer 2 Sprays into each nostril at bedtime.  Dose: 2 Spray     diltiazem 120 MG SR capsule  Commonly known as: Tiazac   Take 1 Capsule by mouth every day.  Dose: 120 mg     ezetimibe 10 MG Tabs  Commonly known as: Zetia   Take 1 Tablet by mouth every day.  Dose: 10 mg     lisinopril 10 MG Tabs  Commonly known as: Prinivil   Take 1 Tablet by mouth 2 times a day.  Dose: 10 mg     NON SPECIFIED   Allergy shots every month with   Butch     PriLOSEC OTC 20 MG tablet  Generic drug: omeprazole   Take 20 mg by mouth every day.  Dose: 20 mg     sucralfate 1 GM Tabs  Commonly known as: Carafate   Take 1 Tablet by mouth 4 Times a Day,Before Meals and at Bedtime.  Dose: 1 g     Tylenol PM Extra Strength 500-25 MG Tabs  Generic drug: diphenhydrAMINE-APAP (sleep)   Take 2 Tablets by mouth at bedtime.  Dose: 2 Tablet            STOP taking these medications      rosuvastatin 40 MG tablet  Commonly known as: Crestor              Allergies  Allergies   Allergen Reactions    Brilinta [Ticagrelor] Shortness of Breath     Med put patient in ER twice       DIET  Orders Placed This Encounter   Procedures    Diet Order Diet: Cardiac; Second Modifier: (optional): Consistent CHO (Diabetic)     Standing Status:   Standing     Number of Occurrences:   1     Order Specific Question:   Diet:     Answer:   Cardiac [6]     Order Specific Question:   Second Modifier: (optional)     Answer:   Consistent CHO (Diabetic) [4]       ACTIVITY  As tolerated.  Weight bearing as tolerated    CONSULTATIONS  Cardiology    PROCEDURES  Cardiac catheterization with PCI x 2    LABORATORY  Lab Results   Component Value Date    SODIUM 134 (L) 03/06/2024    POTASSIUM 4.2 03/06/2024    CHLORIDE 99 03/06/2024    CO2 22 03/06/2024    GLUCOSE 261 (H) 03/06/2024    BUN 11 03/06/2024    CREATININE 0.60 03/06/2024        Lab Results   Component Value Date    WBC 9.6 03/06/2024    HEMOGLOBIN 14.5 03/06/2024    HEMATOCRIT 41.3 (L) 03/06/2024    PLATELETCT 213 03/06/2024      EC-ECHOCARDIOGRAM COMPLETE W/ CONT   Final Result      DX-CHEST-PORTABLE (1 VIEW)   Final Result      No acute cardiopulmonary abnormality.         CL-LEFT HEART CATHETERIZATION WITH POSSIBLE INTERVENTION    (Results Pending)      Total time of the discharge process exceeds 45 minutes.

## 2024-03-06 NOTE — DISCHARGE INSTRUCTIONS
Radial Catherization Discharge Instructions      Do not subject hand/arm to any forceful movements for 24 hours    i.e. supporting weight when rising from the chair or bed.   Do not drive a car for 24 hours  You may remove the dressing tomorrow  You may shower on the day following your procedure.  Do not take a tub bath or submerge the puncture site in water for 3 days following the procedure.  No Lifting more than 3-5 pounds with affected wrist for 5 days  Follow up with    Increase fluids for 2 days post procedure.  Continue all previous medications unless otherwise instructed.    If bleeding should occur following discharge:  Sit down and apply firm pressure to site with your fingers for 10 minutes  If the bleeding stops, continue to sit quietly, keeping your wrist straight for 2 hours.  Notify physician as soon as possible ( [phone])  If bleeding does not stop after 10 minutes, or if there is a large amount of bleeding or spurting, call 911 immediately.  Do not drive yourself to the hospital.

## 2024-03-06 NOTE — ASSESSMENT & PLAN NOTE
Uncontrolled  Start on insulin sliding scale with serial Accu-Checks  Check hemoglobin A1c  Hypoglycemic protocol in place  He will need oral hypoglycemics prior to discharge

## 2024-03-06 NOTE — PROGRESS NOTES
4 Eyes Skin Assessment Completed by ANDRIA Garcia and ANDRIA John.    Head WDL  Ears WDL  Nose WDL  Mouth WDL  Neck WDL  Breast/Chest WDL  Shoulder Blades WDL  Spine WDL  (R) Arm/Elbow/Hand WDL, TR band in place at R radial site, clean dry, and intact  (L) Arm/Elbow/Hand WDL  Abdomen WDL  Groin WDL  Scrotum/Coccyx/Buttocks Redness and Blanching  (R) Leg WDL  (L) Leg WDL  (R) Heel/Foot/Toe WDL  (L) Heel/Foot/Toe WDL          Devices In Places Tele Box, Blood Pressure Cuff, Pulse Ox, and Nasal Cannula      Interventions In Place Gray Ear Foams and Pillows    Possible Skin Injury No    Pictures Uploaded Into Epic N/A  Wound Consult Placed N/A  RN Wound Prevention Protocol Ordered No

## 2024-03-06 NOTE — THERAPY
Physical Therapy   Self care      Patient Name: Yves Ivory  Age:  63 y.o., Sex:  male  Medical Record #: [id number]  Today's Date: 3/6/2024          Assessment  Patient is 63 y.o. male with a diagnosis of acute inferior NSTEMI,  post cath on 3/5/24.     PT cardiac rehab order received and chart reviewed. Discussed talk test/RPE scale to modify home walking program, modifiable cardiac risk factors   Patient reporting compliance and understanding as he has had prior MI and education.    No further acute PT cardiac rehab needs identified, please reconsult should conditions change or place a 'PT Eval and Treat' order if a mobility evaluation is requested.       Plan    DC Equipment Recommendations: None  Discharge Recommendations: Anticipate that the patient will have no further physical therapy needs after discharge from the hospital            03/06/24 1206   Prior Living Situation   Prior Services None   Housing / Facility 2 Story House   Steps In Home   (FOS)   Equipment Owned None   Lives with - Patient's Self Care Capacity Spouse   Comments report no difficulty with stairs.   Prior Level of Functional Mobility   Bed Mobility Independent   Transfer Status Independent   Ambulation Independent   Ambulation Distance community   Stairs Independent   History of Falls   History of Falls No   6 Clicks Assessment - How much HELP from from another person do you currently need... (If the patient hasn't done an activity recently, how much help from another person do you think he/she would need if he/she tried?)   Turning from your back to your side while in a flat bed without using bedrails? 4   Moving from lying on your back to sitting on the side of a flat bed without using bedrails? 4   Moving to and from a bed to a chair (including a wheelchair)? 4   Standing up from a chair using your arms (e.g., wheelchair, or bedside chair)? 4   Walking in hospital room? 4   Climbing 3-5 steps with a railing? 4   6 clicks  Mobility Score 24   Anticipated Discharge Equipment and Recommendations   DC Equipment Recommendations None   Discharge Recommendations Anticipate that the patient will have no further physical therapy needs after discharge from the hospital   Interdisciplinary Plan of Care Collaboration   IDT Collaboration with  Nursing;Family / Caregiver   Collaboration Comments Pt and family education completed.

## 2024-03-06 NOTE — ED NOTES
Med Rec complete per patient   Allergies reviewed  Antibiotics in the past 30 days:no  Anticoagulant in past 14 days:no  Pharmacy patient utilizes:[facility] on [address]    Pt has been taking Prilosec for past 3 days    Pt states he is due for his allergy shot some time in mid march

## 2024-03-06 NOTE — ASSESSMENT & PLAN NOTE
Patient will be admitted to the telemetry unit with close cardiac monitoring, serial EKG and troponin  Patient has been given full dose of aspirin and is started on IV heparin, monitor APTT  Continue home diltiazem  Atorvastatin 40 mg  Check 2D echo, lipid panel, TSH and hemoglobin A1c  Nitro and morphine when necessary for chest pain  Cardiology has been consulted

## 2024-03-06 NOTE — CONSULTS
Reason for Consult:  Asked by SUPA [email or web address] to see this patient with  chest pain  Patient's PCP: Stormy Irene D.N.P.    CC:   Chief Complaint   Patient presents with    Abdominal Pain     Abd pain x 2 days. Upper mid abd pain/burning hx of ulcers. Pt has been taking OTC prilosec  Pt went to - given carafate with no relief       HPI: 63-year-old male patient with prior history of hypertension, obesity, smoking, coronary artery disease, prior STEMI s/p PCI of obtuse marginal, at that time he had V-fib cardiac arrest.  Today he presented with epigastric pain radiating to his upper chest feels like burning, pressure-like.  His troponins are elevated.  He continues to have some chest pain.    Medications / Drug list prior to admission:  No current facility-administered medications on file prior to encounter.     Current Outpatient Medications on File Prior to Encounter   Medication Sig Dispense Refill    sucralfate (CARAFATE) 1 GM Tab Take 1 Tablet by mouth 4 Times a Day,Before Meals and at Bedtime. 120 Tablet 3    diphenhydrAMINE-APAP, sleep, (TYLENOL PM EXTRA STRENGTH)  MG Tab Take  by mouth at bedtime.      acetaminophen (TYLENOL) 325 MG Tab Take 325 mg by mouth at bedtime.      NON SPECIFIED Allergy shots every month with Dr. Rae      Azelastine HCl (ASTEPRO NA) Administer  into affected nostril(S) at bedtime. 2 sprays each nostril @ noc      diltiazem (TIAZAC) 120 MG SR capsule Take 1 Capsule by mouth every day. 90 Capsule 3    ezetimibe (ZETIA) 10 MG Tab Take 1 Tablet by mouth every day. 90 Tablet 3    lisinopril (PRINIVIL) 10 MG Tab Take 1 Tablet by mouth 2 times a day. 180 Tablet 3    rosuvastatin (CRESTOR) 40 MG tablet Take 1 Tablet by mouth every day. 90 Tablet 3    Calcium Carbonate Antacid (SHAMA-SELTZER ANTACID PO) Take  by mouth at bedtime.      aspirin EC 81 MG EC tablet Take 1 Tab by mouth every day. 30 Tab 0       Current list of administered Medications:    Current  Facility-Administered Medications:     heparin infusion 25,000 units in 500 mL 0.45% NACL, 0-30 Units/kg/hr (Adjusted), Intravenous, Continuous, Khristopher R Faiss, D.O.    heparin injection 4,000 Units, 4,000 Units, Intravenous, Once, Khristopher R Faiss, D.O.    heparin injection 2,000 Units, 2,000 Units, Intravenous, PRN, Khristopher R Faiss, D.O.    Current Outpatient Medications:     sucralfate (CARAFATE) 1 GM Tab, Take 1 Tablet by mouth 4 Times a Day,Before Meals and at Bedtime., Disp: 120 Tablet, Rfl: 3    diphenhydrAMINE-APAP, sleep, (TYLENOL PM EXTRA STRENGTH)  MG Tab, Take  by mouth at bedtime., Disp: , Rfl:     acetaminophen (TYLENOL) 325 MG Tab, Take 325 mg by mouth at bedtime., Disp: , Rfl:     NON SPECIFIED, Allergy shots every month with Dr. Rae, Disp: , Rfl:     Azelastine HCl (ASTEPRO NA), Administer  into affected nostril(S) at bedtime. 2 sprays each nostril @ noc, Disp: , Rfl:     diltiazem (TIAZAC) 120 MG SR capsule, Take 1 Capsule by mouth every day., Disp: 90 Capsule, Rfl: 3    ezetimibe (ZETIA) 10 MG Tab, Take 1 Tablet by mouth every day., Disp: 90 Tablet, Rfl: 3    lisinopril (PRINIVIL) 10 MG Tab, Take 1 Tablet by mouth 2 times a day., Disp: 180 Tablet, Rfl: 3    rosuvastatin (CRESTOR) 40 MG tablet, Take 1 Tablet by mouth every day., Disp: 90 Tablet, Rfl: 3    Calcium Carbonate Antacid (SHAMA-SELTZER ANTACID PO), Take  by mouth at bedtime., Disp: , Rfl:     aspirin EC 81 MG EC tablet, Take 1 Tab by mouth every day., Disp: 30 Tab, Rfl: 0    Past Medical History:   Diagnosis Date    Anxiety     Bronchitis     CAD (coronary artery disease)     STEMI-S/P DENISSE to OM    Chickenpox     Coronary heart disease     Depression     Upper sorbian measles     Hyperlipidemia     Hypertension     Influenza     Obesity     Sleep apnea     Ventricular tachycardia (HCC)     post STEMI     Whooping cough        Past Surgical History:   Procedure Laterality Date    ANGIOPLASTY      APPENDECTOMY CHILD       "TONSILLECTOMY      ISRAEL CARDIAC CATH         Family History   Problem Relation Age of Onset    Cancer Mother         breast cancer    Heart Disease Father 38        heart transplant, 3 MI's, multiple bypass    Stroke Maternal Grandmother      Patient family history was personally reviewed, no pertinent family history to current presentation    Social History     Tobacco Use    Smoking status: Former     Current packs/day: 0.00     Average packs/day: 1 pack/day for 40.0 years (40.0 ttl pk-yrs)     Types: Cigars, Cigarettes     Start date:      Quit date:      Years since quittin.1    Smokeless tobacco: Former     Types: Chew     Quit date:     Tobacco comments:     1 - 2 cigars on the weeknds    Vaping Use    Vaping Use: Never used   Substance Use Topics    Alcohol use: Not Currently     Comment: 6-7/week    Drug use: Not Currently     Types: Marijuana       ALLERGIES:  Allergies   Allergen Reactions    Brilinta [Ticagrelor] Shortness of Breath     Med put patient in ER twice       Review of systems:  A complete review of symptoms was reviewed with patient. This is reviewed in H&P and PMH. ALL OTHERS reviewed and negative    Physical exam:  Patient Vitals for the past 24 hrs:   BP Temp Temp src Pulse Resp SpO2 Height Weight   24 1715 (!) 142/90 -- -- 74 18 94 % -- --   24 1517 (!) 132/92 -- -- -- -- -- -- --   24 1516 (!) 139/98 -- -- -- -- -- -- --   24 1513 -- -- -- -- -- -- 1.803 m (5' 11\") 107 kg (234 lb 12.6 oz)   24 1508 (!) 160/87 35.8 °C (96.5 °F) Temporal (!) 109 14 96 % -- --     General: No acute distress.   EYES: no jaundice  HEENT: OP clear   Neck:  No JVD.   CVS:   RRR. S1 + S2. No M/R/G  Resp: Normal respiratory effort, CTAB. No wheezing or crackles/rhonchi.  Abdomen: Soft, ND,  Skin: Grossly nothing acute no obvious rashes  Neurological: Alert, Moves all extremities  Extremities:   no edema. No cyanosis.       Data:  Laboratory studies personally reviewed " by me:  Recent Results (from the past 24 hour(s))   COMP METABOLIC PANEL    Collection Time: 03/05/24  3:23 PM   Result Value Ref Range    Sodium 134 (L) 135 - 145 mmol/L    Potassium 4.2 3.6 - 5.5 mmol/L    Chloride 96 96 - 112 mmol/L    Co2 22 20 - 33 mmol/L    Anion Gap 16.0 7.0 - 16.0    Glucose 246 (H) 65 - 99 mg/dL    Bun 11 8 - 22 mg/dL    Creatinine 0.72 0.50 - 1.40 mg/dL    Calcium 10.1 8.5 - 10.5 mg/dL    Correct Calcium 9.4 8.5 - 10.5 mg/dL    AST(SGOT) 68 (H) 12 - 45 U/L    ALT(SGPT) 44 2 - 50 U/L    Alkaline Phosphatase 91 30 - 99 U/L    Total Bilirubin 1.0 0.1 - 1.5 mg/dL    Albumin 4.9 3.2 - 4.9 g/dL    Total Protein 7.9 6.0 - 8.2 g/dL    Globulin 3.0 1.9 - 3.5 g/dL    A-G Ratio 1.6 g/dL   LIPASE    Collection Time: 03/05/24  3:23 PM   Result Value Ref Range    Lipase 34 11 - 82 U/L   CBC with Differential    Collection Time: 03/05/24  3:23 PM   Result Value Ref Range    WBC 9.2 4.8 - 10.8 K/uL    RBC 5.78 4.70 - 6.10 M/uL    Hemoglobin 17.1 14.0 - 18.0 g/dL    Hematocrit 48.8 42.0 - 52.0 %    MCV 84.4 81.4 - 97.8 fL    MCH 29.6 27.0 - 33.0 pg    MCHC 35.0 32.3 - 36.5 g/dL    RDW 35.6 (L) 35.9 - 50.0 fL    Platelet Count 267 164 - 446 K/uL    MPV 10.1 9.0 - 12.9 fL    Neutrophils-Polys 67.50 44.00 - 72.00 %    Lymphocytes 23.70 22.00 - 41.00 %    Monocytes 7.70 0.00 - 13.40 %    Eosinophils 0.50 0.00 - 6.90 %    Basophils 0.30 0.00 - 1.80 %    Immature Granulocytes 0.30 0.00 - 0.90 %    Nucleated RBC 0.00 0.00 - 0.20 /100 WBC    Neutrophils (Absolute) 6.19 1.82 - 7.42 K/uL    Lymphs (Absolute) 2.18 1.00 - 4.80 K/uL    Monos (Absolute) 0.71 0.00 - 0.85 K/uL    Eos (Absolute) 0.05 0.00 - 0.51 K/uL    Baso (Absolute) 0.03 0.00 - 0.12 K/uL    Immature Granulocytes (abs) 0.03 0.00 - 0.11 K/uL    NRBC (Absolute) 0.00 K/uL   Troponins NOW    Collection Time: 03/05/24  3:23 PM   Result Value Ref Range    Troponin T 360 (H) 6 - 19 ng/L   ESTIMATED GFR    Collection Time: 03/05/24  3:23 PM   Result Value Ref  Range    GFR (CKD-EPI) 104 >60 mL/min/1.73 m 2   EKG    Collection Time: 24  3:26 PM   Result Value Ref Range    Report       [facility] Emergency Dept.    Test Date:  2024  Pt Name:    MICKEY CARVAJAL            Department: ER  MRN:        [id number]                      Room:  Gender:     Male                         Technician: [id number]  :        1960                   Requested By:ER TRIAGE PROTOCOL  Order #:    [id number]                    Reading MD:    Measurements  Intervals                                Axis  Rate:       88                           P:          47  GA:         181                          QRS:        31  QRSD:       124                          T:          18  QT:         356  QTc:        431    Interpretive Statements  Sinus rhythm  Nonspecific intraventricular conduction delay  Minimal ST elevation, inferior leads  Compared to ECG 10/06/2016 22:35:37  ST (T wave) deviation now present         Imaging:  DX-CHEST-PORTABLE (1 VIEW)   Final Result      No acute cardiopulmonary abnormality.         CL-LEFT HEART CATHETERIZATION WITH POSSIBLE INTERVENTION    (Results Pending)           EKG tracings personally reviewed by me sinus rhythm, subtle inferior ST elevations      All pertinent features of laboratory and imaging reviewed including primary images where applicable      Active Problems:    * No active hospital problems. *  Resolved Problems:    * No resolved hospital problems. *      Assessment / Plan:  63-year-old male patient with high risk non-STEMI, subtle inferior ST elevations, ongoing chest pain, prior history of coronary artery disease with stent to obtuse marginal artery.  Due to ongoing symptoms, EKG changes I would recommend urgent coronary angiogram with possible PCI.  Discussed with the ED physician Dr. Ray.    Risk benefits discussed with patient, he is in agreement to proceed.    I personally discussed his case with  Dr mR BAXTER  KIMMY Ray    Future Appointments   Date Time Provider Department Center   3/20/2024  3:30 PM [facility] None   4/4/2024  8:40 AM Stormy Irene D.N.P. [facility]       It is my pleasure to participate in the care of Mr. Ivory.  Please do not hesitate to contact me with questions or concerns.    Delvis Cespedes M.D.    3/5/2024

## 2024-03-06 NOTE — PROGRESS NOTES
Cardiology Follow Up Progress Note    Date of Service  3/6/2024    Attending Physician  Alexa Pham D.O.    Chief Complaint   Abdominal pain    HPI  Jose J Ivory is a 63 y.o. male with a history of hypertension, obesity, smoking, CAD with prior STEMI with V-fib cardiac arrest s/p PCI to obtuse marginal admitted 3/5/2024 with epigastric pain and concern for NSTEMI.    Interim Events  3/6/2024: He underwent successful coronary angiogram yesterday s/p 2 DENISSE distal RCA.  No other cardiac events overnight.  Sinus rhythm on telemetry.  Vital signs stable.  SpO2 93 to 97% 2 L nasal cannula.  He reports he is feeling well today and his pain has resolved. No chest pain or palpitations. No shortness of breath, dyspnea on exertion, orthopnea or PND. No lower extremity edema. No dizziness or lightheadedness. No syncope or presyncope.      Review of Systems  Review of Systems   Constitutional:  Negative for chills, diaphoresis, fatigue and fever.   HENT: Negative.     Eyes: Negative.    Respiratory:  Negative for cough, chest tightness and shortness of breath.    Cardiovascular:  Negative for chest pain, palpitations and leg swelling.   Gastrointestinal:  Negative for abdominal distention, abdominal pain, constipation, diarrhea, nausea and vomiting.   Endocrine: Negative.    Genitourinary:  Negative for decreased urine volume, difficulty urinating, dysuria, frequency and urgency.   Musculoskeletal:  Negative for arthralgias and myalgias.   Skin:  Negative for color change.   Neurological:  Negative for dizziness, syncope and light-headedness.   Hematological:  Does not bruise/bleed easily.   Psychiatric/Behavioral: Negative.         Vital signs in last 24 hours  Temp:  [35.8 °C (96.5 °F)-37.1 °C (98.8 °F)] 36.4 °C (97.5 °F)  Pulse:  [] 84  Resp:  [10-20] 18  BP: (104-160)/(64-98) 112/74  SpO2:  [90 %-97 %] 95 %    Physical Exam  Physical Exam  Vitals and nursing note reviewed.   Constitutional:       General:  He is not in acute distress.     Appearance: Normal appearance. He is not toxic-appearing.   HENT:      Head: Normocephalic and atraumatic.      Right Ear: External ear normal.      Left Ear: External ear normal.      Nose: Nose normal.      Mouth/Throat:      Mouth: Mucous membranes are moist.      Pharynx: Oropharynx is clear.   Eyes:      General: No scleral icterus.     Extraocular Movements: Extraocular movements intact.      Conjunctiva/sclera: Conjunctivae normal.      Pupils: Pupils are equal, round, and reactive to light.   Neck:      Vascular: No JVD.   Cardiovascular:      Rate and Rhythm: Normal rate and regular rhythm.      Pulses: Normal pulses.      Heart sounds: Normal heart sounds. No murmur heard.     No friction rub. No gallop.      Comments: Right wrist:  Cath access site with clean dry intact dressing.  No oozing, drainage or signs of hematoma.  Pulmonary:      Effort: Pulmonary effort is normal.      Breath sounds: Normal breath sounds.   Abdominal:      General: Abdomen is flat. Bowel sounds are normal. There is no distension.      Palpations: Abdomen is soft.      Tenderness: There is no abdominal tenderness.   Musculoskeletal:         General: Normal range of motion.      Cervical back: Normal range of motion and neck supple.      Right lower leg: No edema.      Left lower leg: No edema.   Skin:     General: Skin is warm and dry.      Capillary Refill: Capillary refill takes less than 2 seconds.      Coloration: Skin is not jaundiced.   Neurological:      General: No focal deficit present.      Mental Status: He is alert and oriented to person, place, and time. Mental status is at baseline.   Psychiatric:         Mood and Affect: Mood normal.         Behavior: Behavior normal.         Judgment: Judgment normal.         Lab Review  Lab Results   Component Value Date/Time    WBC 9.6 03/06/2024 03:43 AM    RBC 4.82 03/06/2024 03:43 AM    HEMOGLOBIN 14.5 03/06/2024 03:43 AM    HEMATOCRIT 41.3  "(L) 03/06/2024 03:43 AM    MCV 85.7 03/06/2024 03:43 AM    MCH 30.1 03/06/2024 03:43 AM    MCHC 35.1 03/06/2024 03:43 AM    MPV 9.9 03/06/2024 03:43 AM      Lab Results   Component Value Date/Time    SODIUM 134 (L) 03/06/2024 03:43 AM    POTASSIUM 4.2 03/06/2024 03:43 AM    CHLORIDE 99 03/06/2024 03:43 AM    CO2 22 03/06/2024 03:43 AM    GLUCOSE 261 (H) 03/06/2024 03:43 AM    BUN 11 03/06/2024 03:43 AM    CREATININE 0.60 03/06/2024 03:43 AM      Lab Results   Component Value Date/Time    ASTSGOT 93 (H) 03/06/2024 03:43 AM    ALTSGPT 39 03/06/2024 03:43 AM     Lab Results   Component Value Date/Time    CHOLSTRLTOT 105 03/06/2024 03:43 AM    LDL 38 03/06/2024 03:43 AM    HDL 29 (A) 03/06/2024 03:43 AM    TRIGLYCERIDE 189 (H) 03/06/2024 03:43 AM    TROPONINT 319 (H) 03/05/2024 05:14 PM       No results for input(s): \"NTPROBNP\" in the last 72 hours.    Cardiac Imaging and Procedures Review    Echocardiogram:  Pending    Cardiac Catheterization:  3/5/2024  Final diagnosis:   single vessel coronary artery disease.  Successful percutaneous intervention of right coronary artery.  Recommendations: Guideline directed medical therapy and risk factor management  Coronary arteriograms:  Left main: Mild luminal irregularities, no significant stenosis.  Left anterior descending: Calcified proximal vessel.  Diffuse mild disease.  Apical LAD has focal 60% stenosis.  Diagonals are small vessels, no significant stenosis.  Left circumflex: Gives 2 marginal branches.  Large first marginal branch has prior stent, which is widely patent with good runoff.  No significant disease in left circumflex system.  Right coronary: 99% distal stenosis with STEPHENIE I flow, lesion just prior to bifurcation, dominant  Left Heart Catheterization:  Left Ventriculogram: 55%, inferior wall hypokinesis  Left Ventricular EDP: 14 mm Hg   Aortic Valve Gradient: No significant AV gradient noted    Imaging  Chest X-Ray:  3/5/2024   FINDINGS:  LUNGS: Minimal linear " atelectasis/scarring in the left lower lung. No focal consolidation. No effusions.  PNEUMOTHORAX: None.  LINES AND TUBES: None.  MEDIASTINUM: No cardiomegaly.  MUSCULOSKELETAL STRUCTURES: No acute displaced fracture.  IMPRESSION:  No acute cardiopulmonary abnormality.      Assessment/Plan  #NSTEMI  # CAD with history of STEMI, Vfib arrest   #Hypertension  #Hyperlipidemia  #Tobacco use    Recommendations:  -No further symptoms.  -He has been able to ambulate the halls without difficulty.  -Continue to attempt to wean supplemental O2.  -S/p PCI to DENISSE distal RCA yesterday.  -Continue DAPT with aspirin and Effient for the next 12 months.  -Continue lisinopril 10 mg twice daily.  - Start metoprolol 25mg BID  - Start Jardiance   -Echo pending, if looks ok can consider DC later today  -Meds-to-beds on discharge  -Cardiac rehab referral placed  -Education provided  -Radial site care instructions provided  -Discussed returning to ER if chest pain returns and/or call cardiology office at [phone] with any additional new or worsening symptoms  -Discussed CV risk factor modification including cholesterol management, blood pressure management, smoking cessation, diet and lifestyle changes.     Cardiology will continue to follow.     Thank you for allowing me to participate in the care of Yves Ivory .    Charmaine Avalos PA-C, Cardiology  [facility], [facility]  [address] [place] [id number]  Phone: [phone]  Fax: [phone]    PLEASE NOTE: This note was created using voice recognition software. I have made every reasonable attempt to correct obvious errors, but I expect that there are errors of grammar and possibly content that I did not discover before finalizing the note.     I personally spent a total of 18 minutes which includes face-to-face time and non-face-to-face time spent on preparing to see the patient, reviewing  hospital notes and tests, obtaining history from the patient, performing a medically appropriate exam, counseling and educating the patient, ordering medications/tests/procedures/referrals as clinically indicated, and documenting information in the electronic medical record.

## 2024-03-07 NOTE — CONSULTS
Diabetes education: Pt is newly dx with diabetes ( hx of pre diabetes?), admitted with blood sugar of 246,  and Hga1c of 10.1% ( previous A1c  from 6/9/23 was 7.1%). Pt was on regular insulin per sliding scale coverage ac and hs with blood sugars of 256 ( 5 units), and 288 ( 5 units).  Met with pt and SO before discharge. Discussed what diabetes was, the difference between pre diabetes and type two, hypoglycemia,  hyperglycemia, and goals for blood sugars. Discussed need for carbohydrates and proteins, with every meal and  why. Discussed the importance of the HS snack with a carbohydrate and protein. Discussed need, benefit and precautions with exercise ( when released by cardiology).  Discussed  what effects blood sugars. Discussed need to follow up with PCP, regarding hospitalization and blood sugars.  to school.  Insulin was discussed as well, insulin storage, shelf life and site rotation. Pt practiced with saline pens, and practice device. Novolog sliding scale reviewed.  Pt was taught to use a One touch verio flex meter and delica plus lancet device.   Plan: pen handout given and questions answered.

## 2024-03-12 ENCOUNTER — OFFICE VISIT (OUTPATIENT)
Dept: URGENT CARE | Facility: PHYSICIAN GROUP | Age: 64
End: 2024-03-12
Payer: COMMERCIAL

## 2024-03-12 VITALS
HEIGHT: 71 IN | DIASTOLIC BLOOD PRESSURE: 68 MMHG | TEMPERATURE: 96.8 F | OXYGEN SATURATION: 96 % | WEIGHT: 234.9 LBS | HEART RATE: 78 BPM | BODY MASS INDEX: 32.89 KG/M2 | SYSTOLIC BLOOD PRESSURE: 104 MMHG | RESPIRATION RATE: 16 BRPM

## 2024-03-12 DIAGNOSIS — M79.642 LEFT HAND PAIN: ICD-10-CM

## 2024-03-12 DIAGNOSIS — R20.8 PAIN OF SKIN: ICD-10-CM

## 2024-03-12 PROCEDURE — 3074F SYST BP LT 130 MM HG: CPT | Performed by: NURSE PRACTITIONER

## 2024-03-12 PROCEDURE — 99213 OFFICE O/P EST LOW 20 MIN: CPT | Performed by: NURSE PRACTITIONER

## 2024-03-12 PROCEDURE — 3078F DIAST BP <80 MM HG: CPT | Performed by: NURSE PRACTITIONER

## 2024-03-12 ASSESSMENT — FIBROSIS 4 INDEX: FIB4 SCORE: 4.26

## 2024-03-12 NOTE — PROGRESS NOTES
Yves Ivory is a 63 y.o. male who presents for Hand Swelling (Pt had a heart attack  3/5/24 and had a stent and IV put in through his hand. LT hand is swollen w/ erythema. Wanted to check in to make sure it's not infected. )      HPI  This is a new problem. Yves Ivory is a 63 y.o. patient who presents to urgent care with c/o: He had a heart attack on March 5, 2024.  He had a stent placed.  He had several IVs during his stay but the IV site on his left hand is still sore and tender.  He is concerned about the possibility of infection.  The other IV sites are not tender.  This is his only hand IV.  It was discontinued last week.  There has been no drainage or pain with movement.  Pain is with touching his hand.  Treatments tried none.  No other aggravating alleviating factors.  He is taking all of his medications as they are prescribed to him.  He reports his blood sugars have been very good running 103-110 in the mornings.  He has made significant changes to his dietary habits    ROS See HPI    Allergies:       Allergies   Allergen Reactions    Brilinta [Ticagrelor] Shortness of Breath     Med put patient in ER twice       PMSFS Hx:  Past Medical History:   Diagnosis Date    Anxiety     Bronchitis     CAD (coronary artery disease)     STEMI-S/P DENISSE to OM    Chickenpox     Coronary heart disease     Depression     [place] measles     Hyperlipidemia     Hypertension     Influenza     Obesity     Sleep apnea     Status post placement of stent in right coronary artery - 3/2024 NSTEMI 03/05/2024    Ventricular tachycardia (HCC)     post STEMI     Whooping cough      Past Surgical History:   Procedure Laterality Date    ANGIOPLASTY      APPENDECTOMY CHILD      TONSILLECTOMY      ZKENDAL CARDIAC CATH       Family History   Problem Relation Age of Onset    Cancer Mother         breast cancer    Heart Disease Father 38        heart transplant, 3 MI's, multiple bypass    Stroke Maternal Grandmother       Social History     Tobacco Use    Smoking status: Former     Current packs/day: 0.00     Average packs/day: 1 pack/day for 40.0 years (40.0 ttl pk-yrs)     Types: Cigars, Cigarettes     Start date:      Quit date:      Years since quittin.2    Smokeless tobacco: Former     Types: Chew     Quit date:     Tobacco comments:     1 - 2 cigars on the weeknds    Substance Use Topics    Alcohol use: Not Currently     Comment: 6-7/week       Problems:   Patient Active Problem List   Diagnosis    History of ST elevation myocardial infarction (STEMI)    Coronary artery disease involving native coronary artery of native heart without angina pectoris    HLD (hyperlipidemia)    VT (ventricular tachycardia) ([facility])    Cigar smoker    Anxiety    Abdominal gas pain    Preventative health care    Obesity (BMI 30-39.9)    Cough    Type II diabetes mellitus with complication ([facility])    ROSALINO (obstructive sleep apnea)    Former smoker    Biliary colic    Vasculogenic erectile dysfunction    Essential hypertension    History of nocturia    Toenail deformity    Nasal congestion    NSTEMI (non-ST elevated myocardial infarction) ([facility])    Status post placement of stent in right coronary artery - 3/2024 NSTEMI       Medications:   Current Outpatient Medications on File Prior to Visit   Medication Sig Dispense Refill    prasugrel (EFFIENT) 10 MG Tab Take 1 Tablet by mouth every day for 90 days. 90 Tablet 0    atorvastatin (LIPITOR) 40 MG Tab Take 1 Tablet by mouth every evening for 60 days. 60 Tablet 0    Blood Glucose Monitoring Suppl (BLOOD GLUCOSE MONITOR SYSTEM) w/Device Kit Test blood sugar as recommended by provider. 1 Kit 0    glucose blood strip Use one strip to test blood sugar three times daily before meals. 100 Strip 0    Lancets Use one lancet to test blood sugar three times daily before meals. 100 Each 0    Alcohol Swabs Wipe site with prep pad prior to injection. 100 Each 0    Insulin Pen Needle 32 G x 4 mm Use one  "pen needle in pen device to inject insulin three times daily. 100 Each 0    insulin aspart (NOVOLOG FLEXPEN) 100 UNIT/ML injection PEN Inject 2-9 Units under the skin 3 times a day before meals for 60 days. 70   - 150  mg/dL =      0 Units  151 - 200  mg/dL =    2 Units  201 - 250  mg/dL =    3 Units  251 - 300  mg/dL  =   5 Units  301 - 350  mg/dL  =   6 Units  351 - 400 mg/dL   =   8 Units  Over 400 mg/dL   =   9 Units 18 mL 0    Empagliflozin (JARDIANCE) 25 MG Tab Take 1 tablet by mouth every day. 90 Tablet 3    metoprolol tartrate (LOPRESSOR) 25 MG Tab Take 1 Tablet by mouth 2 times a day. 180 Tablet 0    sucralfate (CARAFATE) 1 GM Tab Take 1 Tablet by mouth 4 Times a Day,Before Meals and at Bedtime. 120 Tablet 3    omeprazole (PRILOSEC OTC) 20 MG tablet Take 20 mg by mouth every day.      diphenhydrAMINE-APAP, sleep, (TYLENOL PM EXTRA STRENGTH)  MG Tab Take 2 Tablets by mouth at bedtime.      acetaminophen (TYLENOL) 500 MG Tab Take 500 mg by mouth every evening.      NON SPECIFIED Allergy shots every month with Dr. Rae      Azelastine HCl (ASTEPRO NA) Administer 2 Sprays into each nostril at bedtime.      diltiazem (TIAZAC) 120 MG SR capsule Take 1 Capsule by mouth every day. 90 Capsule 3    ezetimibe (ZETIA) 10 MG Tab Take 1 Tablet by mouth every day. 90 Tablet 3    lisinopril (PRINIVIL) 10 MG Tab Take 1 Tablet by mouth 2 times a day. 180 Tablet 3    Calcium Carbonate Antacid (SHAMA-SELTZER ANTACID PO) Take 1 Tablet by mouth at bedtime.      aspirin EC 81 MG EC tablet Take 1 Tab by mouth every day. 30 Tab 0     No current facility-administered medications on file prior to visit.        Objective:     /68 (BP Location: Right arm, Patient Position: Sitting, BP Cuff Size: Adult)   Pulse 78   Temp 36 °C (96.8 °F) (Temporal)   Resp 16   Ht 1.803 m (5' 11\") Comment: Pt reported  Wt 107 kg (234 lb 14.4 oz)   SpO2 96%   BMI 32.76 kg/m²     Physical Exam  Vitals and nursing note reviewed. "   Constitutional:       Appearance: Normal appearance. He is normal weight.   Cardiovascular:      Rate and Rhythm: Normal rate.      Pulses: Normal pulses.   Pulmonary:      Effort: Pulmonary effort is normal.   Skin:     General: Skin is warm.      Capillary Refill: Capillary refill takes less than 2 seconds.      Findings: No erythema.      Comments: Well-healed puncture wound on the dorsum of the left hand with surrounding hyperpigmentation.  There is no drainage.  There is + TTP.  Negative lymphangitis.   Neurological:      Mental Status: He is alert and oriented to person, place, and time.   Psychiatric:         Mood and Affect: Mood normal.         Behavior: Behavior normal.         Assessment /Associated Orders:      1. Left hand pain        2. Pain of skin              Medical Decision Making:    Jose J is a very pleasant 63 y.o. male who is clinically stable at today's acute urgent care visit.  No acute distress noted.  VSS. Appropriate for outpatient care at this time.   Acute problem today with uncertain prognosis. No evidence of acute thrombophlebitis or bacterial infection/cellulitis.  + TTP and soft tissue around IV site insertion.  No edema.  FROM of hand.  Brisk capillary refill distally.  Warm soaks/ compresses BID    Resume all prior  RX medications. Take as prescribed.   Discussed Dx, management options (risks,benefits, and alternatives to planned treatment), natural progression and supportive care.  Expressed understanding and the treatment plan was agreed upon.   Questions were encouraged and answered   Return to urgent care prn if new or worsening sx or if there is no improvement in condition prn.          Please note that this dictation was created using voice recognition software. I have worked with consultants from the vendor as well as technical experts from [employer] to optimize the interface. I have made every reasonable attempt to correct obvious errors, but I expect that there are  errors of grammar and possibly content that I did not discover before finalizing the note.  This note was electronically signed by provider

## 2024-03-16 ENCOUNTER — OFFICE VISIT (OUTPATIENT)
Dept: URGENT CARE | Facility: PHYSICIAN GROUP | Age: 64
End: 2024-03-16
Payer: COMMERCIAL

## 2024-03-16 VITALS
OXYGEN SATURATION: 95 % | RESPIRATION RATE: 18 BRPM | SYSTOLIC BLOOD PRESSURE: 124 MMHG | BODY MASS INDEX: 34.26 KG/M2 | HEIGHT: 71 IN | TEMPERATURE: 97.5 F | DIASTOLIC BLOOD PRESSURE: 70 MMHG | HEART RATE: 82 BPM | WEIGHT: 244.71 LBS

## 2024-03-16 DIAGNOSIS — R30.0 DYSURIA: ICD-10-CM

## 2024-03-16 LAB
APPEARANCE UR: CLEAR
BILIRUB UR STRIP-MCNC: NEGATIVE MG/DL
COLOR UR AUTO: NORMAL
GLUCOSE UR STRIP.AUTO-MCNC: 500 MG/DL
KETONES UR STRIP.AUTO-MCNC: NORMAL MG/DL
LEUKOCYTE ESTERASE UR QL STRIP.AUTO: NEGATIVE
NITRITE UR QL STRIP.AUTO: NEGATIVE
PH UR STRIP.AUTO: 5.5 [PH] (ref 5–8)
PROT UR QL STRIP: NEGATIVE MG/DL
RBC UR QL AUTO: NEGATIVE
SP GR UR STRIP.AUTO: 1.02
UROBILINOGEN UR STRIP-MCNC: 0.2 MG/DL

## 2024-03-16 PROCEDURE — 3078F DIAST BP <80 MM HG: CPT | Performed by: NURSE PRACTITIONER

## 2024-03-16 PROCEDURE — 99213 OFFICE O/P EST LOW 20 MIN: CPT | Performed by: NURSE PRACTITIONER

## 2024-03-16 PROCEDURE — 3074F SYST BP LT 130 MM HG: CPT | Performed by: NURSE PRACTITIONER

## 2024-03-16 PROCEDURE — 81002 URINALYSIS NONAUTO W/O SCOPE: CPT | Performed by: NURSE PRACTITIONER

## 2024-03-16 RX ORDER — CIPROFLOXACIN 500 MG/1
500 TABLET, FILM COATED ORAL EVERY 12 HOURS
Qty: 10 TABLET | Refills: 0 | Status: SHIPPED | OUTPATIENT
Start: 2024-03-16 | End: 2024-03-20

## 2024-03-16 ASSESSMENT — FIBROSIS 4 INDEX: FIB4 SCORE: 4.26

## 2024-03-16 ASSESSMENT — ENCOUNTER SYMPTOMS
ABDOMINAL PAIN: 0
CHILLS: 0
FEVER: 0

## 2024-03-16 NOTE — PROGRESS NOTES
"  Subjective:     Yves Ivory is a 63 y.o. male who presents for UTI (Burning sensation, uncomfortable sensation when urinating X last night. States began after taking Jardiance medication )      Presents for dysuria that started last night. Has \"trickled\" urine. Denies other penile discharge. No itch. Has concerns for a UTI, as he was just started on Jardiance. He was hospitalized for a MI last week. Denies having a urine catheter while hospitalized. Denies concerns for a STI.    UTI  This is a new problem. The current episode started yesterday. Associated symptoms include urinary symptoms. Pertinent negatives include no abdominal pain, chills, fever, rash or vomiting.       Past Medical History:   Diagnosis Date    Anxiety     Bronchitis     CAD (coronary artery disease)     STEMI-S/P DENISSE to OM    Chickenpox     Coronary heart disease     Depression     [place] measles     Hyperlipidemia     Hypertension     Influenza     Obesity     Sleep apnea     Status post placement of stent in right coronary artery - 3/2024 NSTEMI 2024    Ventricular tachycardia (HCC)     post STEMI     Whooping cough        Past Surgical History:   Procedure Laterality Date    ANGIOPLASTY      APPENDECTOMY CHILD      TONSILLECTOMY      ZZZ CARDIAC CATH         Social History     Socioeconomic History    Marital status:      Spouse name: Not on file    Number of children: Not on file    Years of education: Not on file    Highest education level: Associate degree: occupational, technical, or vocational program   Occupational History    Not on file   Tobacco Use    Smoking status: Former     Current packs/day: 0.00     Average packs/day: 1 pack/day for 40.0 years (40.0 ttl pk-yrs)     Types: Cigars, Cigarettes     Start date:      Quit date: 2016     Years since quittin.2    Smokeless tobacco: Former     Types: Chew     Quit date:     Tobacco comments:     1 - 2 cigars on the weeknds    Vaping Use    Vaping " Use: Never used   Substance and Sexual Activity    Alcohol use: Not Currently     Comment: 6-7/week    Drug use: Not Currently     Types: Marijuana    Sexual activity: Yes     Partners: Female   Other Topics Concern    Not on file   Social History Narrative    Not on file     Social Determinants of Health     Financial Resource Strain: Low Risk  (4/11/2022)    Overall Financial Resource Strain (CARDIA)     Difficulty of Paying Living Expenses: Not hard at all   Food Insecurity: No Food Insecurity (4/11/2022)    Hunger Vital Sign     Worried About Running Out of Food in the Last Year: Never true     Ran Out of Food in the Last Year: Never true   Transportation Needs: No Transportation Needs (4/11/2022)    PRAPARE - Transportation     Lack of Transportation (Medical): No     Lack of Transportation (Non-Medical): No   Physical Activity: Sufficiently Active (4/11/2022)    Exercise Vital Sign     Days of Exercise per Week: 5 days     Minutes of Exercise per Session: 150+ min   Stress: Stress Concern Present (4/11/2022)    [place] [employer] - Occupational Stress Questionnaire     Feeling of Stress : To some extent   Social Connections: Socially Isolated (4/11/2022)    Social Connection and Isolation Panel [NHANES]     Frequency of Communication with Friends and Family: Once a week     Frequency of Social Gatherings with Friends and Family: Once a week     Attends Christian Services: Never     Active Member of Clubs or Organizations: No     Attends Club or Organization Meetings: Never     Marital Status:    Intimate Partner Violence: Not on file   Housing Stability: Low Risk  (4/11/2022)    Housing Stability Vital Sign     Unable to Pay for Housing in the Last Year: No     Number of Places Lived in the Last Year: 1     Unstable Housing in the Last Year: No        Family History   Problem Relation Age of Onset    Cancer Mother         breast cancer    Heart Disease Father 38        heart  "transplant, 3 MI's, multiple bypass    Stroke Maternal Grandmother         Allergies   Allergen Reactions    Brilinta [Ticagrelor] Shortness of Breath     Med put patient in ER twice       Review of Systems   Constitutional:  Negative for chills and fever.   Gastrointestinal:  Negative for abdominal pain and vomiting.   Genitourinary:  Positive for dysuria and urgency. Negative for flank pain and hematuria.   Skin:  Negative for itching and rash.   All other systems reviewed and are negative.       Objective:   /70 (BP Location: Left arm, Patient Position: Sitting, BP Cuff Size: Adult)   Pulse 82   Temp 36.4 °C (97.5 °F) (Temporal)   Resp 18   Ht 1.803 m (5' 11\")   Wt 111 kg (244 lb 11.4 oz)   SpO2 95%   BMI 34.13 kg/m²     Physical Exam  Vitals reviewed.   Constitutional:       General: He is not in acute distress.     Appearance: He is not toxic-appearing.   Cardiovascular:      Rate and Rhythm: Normal rate.   Pulmonary:      Effort: Pulmonary effort is normal. No respiratory distress.   Abdominal:      Palpations: Abdomen is soft.      Tenderness: There is no abdominal tenderness.   Genitourinary:     Penis: Normal. No erythema, tenderness, discharge, swelling or lesions.    Skin:     General: Skin is warm and dry.      Findings: No erythema or rash.   Neurological:      Mental Status: He is alert and oriented to person, place, and time.         Assessment/Plan:   1. Dysuria  - POCT Urinalysis  - ciprofloxacin (CIPRO) 500 MG Tab; Take 1 Tablet by mouth every 12 hours for 5 days.  Dispense: 10 Tablet; Refill: 0    Results for orders placed or performed in visit on 03/16/24   POCT Urinalysis   Result Value Ref Range    POC Color Dark yellow Negative    POC Appearance Clear Negative    POC Glucose 500 Negative mg/dL    POC Bilirubin Negative Negative mg/dL    POC Ketones Trace Negative mg/dL    POC Specific Gravity 1.025 <1.005 - >1.030    POC Blood Negative Negative    POC Urine PH 5.5 5.0 - 8.0    POC " "Protein Negative Negative mg/dL    POC Urobiligen 0.2 Negative (0.2) mg/dL    POC Nitrites Negative Negative    POC Leukocyte Esterase Negative Negative     -Oral Hydration  -Follow up with PCP.    Follow up urgently for abdominal pain, flank pain, difficulty with urination, fevers, vomiting, weakness, tachycardia, testicular pain or swelling, or any other concerns.    -Stable vitals. Presents with acute dysuria x 2 days. No indication of a UTI on the UA. Discussed possible urinary adverse reactions related to Jardiance: \"increased urine output (3%), urinary tract infection (8% to 9%\". No signs of urethritis on exam. Advised continued monitoring of symptoms, given empiric antibiotic coverage for continued LUTs symptoms. Advised f/u with PCP.     Differential diagnosis, natural history, supportive care, and indications for immediate follow-up discussed.  "

## 2024-03-16 NOTE — PATIENT INSTRUCTIONS
-Oral Hydration  -Follow up with PCP.    Follow up urgently for abdominal pain, flank pain, difficulty with urination, fevers, vomiting, weakness, tachycardia, testicular pain or swelling, or any other concerns.

## 2024-03-18 ENCOUNTER — APPOINTMENT (OUTPATIENT)
Dept: MEDICAL GROUP | Facility: PHYSICIAN GROUP | Age: 64
End: 2024-03-18
Payer: COMMERCIAL

## 2024-03-18 ASSESSMENT — ENCOUNTER SYMPTOMS
FLANK PAIN: 0
VOMITING: 0

## 2024-03-20 ENCOUNTER — OFFICE VISIT (OUTPATIENT)
Dept: CARDIOLOGY | Facility: MEDICAL CENTER | Age: 64
End: 2024-03-20
Attending: NURSE PRACTITIONER
Payer: COMMERCIAL

## 2024-03-20 VITALS
SYSTOLIC BLOOD PRESSURE: 100 MMHG | BODY MASS INDEX: 33.27 KG/M2 | HEIGHT: 70 IN | HEART RATE: 90 BPM | RESPIRATION RATE: 16 BRPM | WEIGHT: 232.4 LBS | OXYGEN SATURATION: 93 % | DIASTOLIC BLOOD PRESSURE: 70 MMHG

## 2024-03-20 DIAGNOSIS — E78.2 MIXED HYPERLIPIDEMIA: ICD-10-CM

## 2024-03-20 DIAGNOSIS — Z95.5 STATUS POST PLACEMENT OF STENT IN RIGHT CORONARY ARTERY: Chronic | ICD-10-CM

## 2024-03-20 DIAGNOSIS — E11.8 TYPE II DIABETES MELLITUS WITH COMPLICATION (HCC): ICD-10-CM

## 2024-03-20 DIAGNOSIS — I25.10 CORONARY ARTERY DISEASE INVOLVING NATIVE CORONARY ARTERY OF NATIVE HEART WITHOUT ANGINA PECTORIS: ICD-10-CM

## 2024-03-20 DIAGNOSIS — G47.33 OSA (OBSTRUCTIVE SLEEP APNEA): ICD-10-CM

## 2024-03-20 DIAGNOSIS — I10 ESSENTIAL HYPERTENSION: ICD-10-CM

## 2024-03-20 PROCEDURE — 99214 OFFICE O/P EST MOD 30 MIN: CPT | Performed by: NURSE PRACTITIONER

## 2024-03-20 PROCEDURE — 3074F SYST BP LT 130 MM HG: CPT | Performed by: NURSE PRACTITIONER

## 2024-03-20 PROCEDURE — 99213 OFFICE O/P EST LOW 20 MIN: CPT | Performed by: NURSE PRACTITIONER

## 2024-03-20 PROCEDURE — 3078F DIAST BP <80 MM HG: CPT | Performed by: NURSE PRACTITIONER

## 2024-03-20 RX ORDER — EMPAGLIFLOZIN 25 MG/1
25 TABLET, FILM COATED ORAL DAILY
Qty: 90 TABLET | Refills: 3 | Status: SHIPPED | OUTPATIENT
Start: 2024-03-20

## 2024-03-20 RX ORDER — ROSUVASTATIN CALCIUM 40 MG/1
40 TABLET, COATED ORAL DAILY
Qty: 90 TABLET | Refills: 3 | Status: SHIPPED | OUTPATIENT
Start: 2024-03-20

## 2024-03-20 RX ORDER — EZETIMIBE 10 MG/1
10 TABLET ORAL DAILY
Qty: 90 TABLET | Refills: 3 | Status: SHIPPED | OUTPATIENT
Start: 2024-03-20

## 2024-03-20 RX ORDER — PRASUGREL 10 MG/1
10 TABLET, FILM COATED ORAL DAILY
Qty: 90 TABLET | Refills: 3 | Status: SHIPPED | OUTPATIENT
Start: 2024-03-20

## 2024-03-20 RX ORDER — ASPIRIN 81 MG/1
81 TABLET ORAL DAILY
Qty: 90 TABLET | Refills: 3 | Status: SHIPPED | OUTPATIENT
Start: 2024-03-20

## 2024-03-20 RX ORDER — ATORVASTATIN CALCIUM 40 MG/1
40 TABLET, FILM COATED ORAL EVERY EVENING
Qty: 90 TABLET | Refills: 3 | Status: SHIPPED | OUTPATIENT
Start: 2024-03-20 | End: 2024-03-20

## 2024-03-20 RX ORDER — LISINOPRIL 10 MG/1
10 TABLET ORAL 2 TIMES DAILY
Qty: 180 TABLET | Refills: 3 | Status: SHIPPED | OUTPATIENT
Start: 2024-03-20

## 2024-03-20 RX ORDER — METOPROLOL SUCCINATE 50 MG/1
50 TABLET, EXTENDED RELEASE ORAL EVERY EVENING
Qty: 90 TABLET | Refills: 3 | Status: SHIPPED | OUTPATIENT
Start: 2024-03-20

## 2024-03-20 ASSESSMENT — ENCOUNTER SYMPTOMS
LOSS OF CONSCIOUSNESS: 0
WEAKNESS: 0
PALPITATIONS: 0
COUGH: 0
WHEEZING: 0
BLURRED VISION: 0
ORTHOPNEA: 0
DOUBLE VISION: 0
FALLS: 0
DIZZINESS: 0
FOCAL WEAKNESS: 0
SHORTNESS OF BREATH: 0
HEADACHES: 0

## 2024-03-20 ASSESSMENT — FIBROSIS 4 INDEX: FIB4 SCORE: 4.26

## 2024-03-20 NOTE — PATIENT INSTRUCTIONS
- stop the atorvastatin, start rosuvastatin 40mg once a day    - stop the diltiazem, continue metoprolol XL 50mg q evening     - get labs done in 3 months prior to your appointment

## 2024-03-20 NOTE — PROGRESS NOTES
Chief Complaint   Patient presents with    Hyperlipidemia    Hypertension    MI (Non ST Segment Elevation MI)     F/V Dx: NSTEMI (non-ST elevated myocardial infarction) (HCC)       Subjective     Jose J Ivory is a 63 y.o. male who presents today for follow up CAD.    He is followed by Dr. Saldivar in our clinic, History of hypertension, obesity, smoking, CAD with prior STEMI 7/2016 with V-fib cardiac arrest s/p PCI to obtuse marginal     admitted 3/5/2024 with epigastric pain and concern for NSTEMI. 3/6/2024 He underwent successful coronary angiogram yesterday s/p 2 DENISSE distal RCA.    Overall, patient is doing well. Tolerating medication well. No reoccurring chest pain/pressure      Past Medical History:   Diagnosis Date    Anxiety     Bronchitis     CAD (coronary artery disease)     STEMI-S/P DENISSE to OM    Chickenpox     Coronary heart disease     Depression     [place] measles     Hyperlipidemia     Hypertension     Influenza     Obesity     Sleep apnea     Status post placement of stent in right coronary artery - 3/2024 NSTEMI 03/05/2024    Ventricular tachycardia (HCC)     post STEMI     Whooping cough      Past Surgical History:   Procedure Laterality Date    ANGIOPLASTY      APPENDECTOMY CHILD      TONSILLECTOMY      ZZZ CARDIAC CATH       Family History   Problem Relation Age of Onset    Cancer Mother         breast cancer    Heart Disease Father 38        heart transplant, 3 MI's, multiple bypass    Stroke Maternal Grandmother      Social History     Socioeconomic History    Marital status:      Spouse name: Not on file    Number of children: Not on file    Years of education: Not on file    Highest education level: Associate degree: occupational, technical, or vocational program   Occupational History    Not on file   Tobacco Use    Smoking status: Former     Current packs/day: 0.00     Average packs/day: 1 pack/day for 40.0 years (40.0 ttl pk-yrs)     Types: Cigars, Cigarettes     Start  date:      Quit date: 2016     Years since quittin.2    Smokeless tobacco: Former     Types: Chew     Quit date:     Tobacco comments:     1 - 2 cigars on the weeknds    Vaping Use    Vaping Use: Never used   Substance and Sexual Activity    Alcohol use: Not Currently     Comment: 6-7/week    Drug use: Not Currently     Types: Marijuana    Sexual activity: Yes     Partners: Female   Other Topics Concern    Not on file   Social History Narrative    Not on file     Social Determinants of Health     Financial Resource Strain: Low Risk  (2022)    Overall Financial Resource Strain (CARDIA)     Difficulty of Paying Living Expenses: Not hard at all   Food Insecurity: No Food Insecurity (2022)    Hunger Vital Sign     Worried About Running Out of Food in the Last Year: Never true     Ran Out of Food in the Last Year: Never true   Transportation Needs: No Transportation Needs (2022)    PRAPARE - Transportation     Lack of Transportation (Medical): No     Lack of Transportation (Non-Medical): No   Physical Activity: Sufficiently Active (2022)    Exercise Vital Sign     Days of Exercise per Week: 5 days     Minutes of Exercise per Session: 150+ min   Stress: Stress Concern Present (2022)    [employer] - Occupational Stress Questionnaire     Feeling of Stress : To some extent   Social Connections: Socially Isolated (2022)    Social Connection and Isolation Panel [NHANES]     Frequency of Communication with Friends and Family: Once a week     Frequency of Social Gatherings with Friends and Family: Once a week     Attends [employer] Services: Never     Active Member of Clubs or Organizations: No     Attends Club or Organization Meetings: Never     Marital Status:    Intimate Partner Violence: Not on file   Housing Stability: Low Risk  (2022)    Housing Stability Vital Sign     Unable to Pay for Housing in the Last Year: No     Number of Places  Lived in the Last Year: 1     Unstable Housing in the Last Year: No     Allergies   Allergen Reactions    Brilinta [Ticagrelor] Shortness of Breath     Med put patient in ER twice     Outpatient Encounter Medications as of 3/20/2024   Medication Sig Dispense Refill    metoprolol SR (TOPROL XL) 50 MG TABLET SR 24 HR Take 1 Tablet by mouth every evening. 90 Tablet 3    prasugrel (EFFIENT) 10 MG Tab Take 1 Tablet by mouth every day. 90 Tablet 3    lisinopril (PRINIVIL) 10 MG Tab Take 1 Tablet by mouth 2 times a day. 180 Tablet 3    ezetimibe (ZETIA) 10 MG Tab Take 1 Tablet by mouth every day. 90 Tablet 3    aspirin 81 MG EC tablet Take 1 Tablet by mouth every day. 90 Tablet 3    Empagliflozin (JARDIANCE) 25 MG Tab Take 1 tablet by mouth every day. 90 Tablet 3    rosuvastatin (CRESTOR) 40 MG tablet Take 1 Tablet by mouth every day. 90 Tablet 3    Blood Glucose Monitoring Suppl (BLOOD GLUCOSE MONITOR SYSTEM) w/Device Kit Test blood sugar as recommended by provider. 1 Kit 0    glucose blood strip Use one strip to test blood sugar three times daily before meals. 100 Strip 0    Lancets Use one lancet to test blood sugar three times daily before meals. 100 Each 0    Alcohol Swabs Wipe site with prep pad prior to injection. 100 Each 0    Insulin Pen Needle 32 G x 4 mm Use one pen needle in pen device to inject insulin three times daily. 100 Each 0    insulin aspart (NOVOLOG FLEXPEN) 100 UNIT/ML injection PEN Inject 2-9 Units under the skin 3 times a day before meals for 60 days. 70   - 150  mg/dL =      0 Units  151 - 200  mg/dL =    2 Units  201 - 250  mg/dL =    3 Units  251 - 300  mg/dL  =   5 Units  301 - 350  mg/dL  =   6 Units  351 - 400 mg/dL   =   8 Units  Over 400 mg/dL   =   9 Units 18 mL 0    sucralfate (CARAFATE) 1 GM Tab Take 1 Tablet by mouth 4 Times a Day,Before Meals and at Bedtime. 120 Tablet 3    diphenhydrAMINE-APAP, sleep, (TYLENOL PM EXTRA STRENGTH)  MG Tab Take 2 Tablets by mouth at bedtime.       acetaminophen (TYLENOL) 500 MG Tab Take 500 mg by mouth every evening.      NON SPECIFIED Allergy shots every month with Dr. Rae      Azelastine HCl (ASTEPRO NA) Administer 2 Sprays into each nostril at bedtime.      Calcium Carbonate Antacid (SHAMA-SELTZER ANTACID PO) Take 1 Tablet by mouth at bedtime.      [DISCONTINUED] atorvastatin (LIPITOR) 40 MG Tab Take 1 Tablet by mouth every evening. 90 Tablet 3    [DISCONTINUED] ciprofloxacin (CIPRO) 500 MG Tab Take 1 Tablet by mouth every 12 hours for 5 days. 10 Tablet 0    [DISCONTINUED] prasugrel (EFFIENT) 10 MG Tab Take 1 Tablet by mouth every day for 90 days. 90 Tablet 0    [DISCONTINUED] atorvastatin (LIPITOR) 40 MG Tab Take 1 Tablet by mouth every evening for 60 days. 60 Tablet 0    [DISCONTINUED] Empagliflozin (JARDIANCE) 25 MG Tab Take 1 tablet by mouth every day. 90 Tablet 3    [DISCONTINUED] metoprolol tartrate (LOPRESSOR) 25 MG Tab Take 1 Tablet by mouth 2 times a day. 180 Tablet 0    [DISCONTINUED] omeprazole (PRILOSEC OTC) 20 MG tablet Take 20 mg by mouth every day.      [DISCONTINUED] diltiazem (TIAZAC) 120 MG SR capsule Take 1 Capsule by mouth every day. 90 Capsule 3    [DISCONTINUED] ezetimibe (ZETIA) 10 MG Tab Take 1 Tablet by mouth every day. 90 Tablet 3    [DISCONTINUED] lisinopril (PRINIVIL) 10 MG Tab Take 1 Tablet by mouth 2 times a day. 180 Tablet 3    [DISCONTINUED] aspirin EC 81 MG EC tablet Take 1 Tab by mouth every day. 30 Tab 0     No facility-administered encounter medications on file as of 3/20/2024.     Review of Systems   Constitutional:  Negative for malaise/fatigue.   Eyes:  Negative for blurred vision and double vision.   Respiratory:  Negative for cough, shortness of breath and wheezing.    Cardiovascular:  Negative for chest pain, palpitations, orthopnea and leg swelling.   Musculoskeletal:  Negative for falls.   Neurological:  Negative for dizziness, focal weakness, loss of consciousness, weakness and headaches.   All other systems  "reviewed and are negative.             Objective     /70 (BP Location: Left arm, Patient Position: Sitting, BP Cuff Size: Adult)   Pulse 90   Resp 16   Ht 1.778 m (5' 10\")   Wt 105 kg (232 lb 6.4 oz)   SpO2 93%   BMI 33.35 kg/m²     Physical Exam  Constitutional:       General: He is not in acute distress.     Appearance: He is well-developed. He is not diaphoretic.   HENT:      Head: Normocephalic and atraumatic.   Eyes:      Pupils: Pupils are equal, round, and reactive to light.   Neck:      Vascular: No JVD.   Cardiovascular:      Rate and Rhythm: Normal rate and regular rhythm.      Heart sounds: Normal heart sounds.   Pulmonary:      Effort: Pulmonary effort is normal.      Breath sounds: Normal breath sounds.   Abdominal:      General: Bowel sounds are normal. There is no distension.      Palpations: Abdomen is soft.   Musculoskeletal:      Right lower leg: No edema.      Left lower leg: No edema.   Skin:     General: Skin is warm and dry.   Neurological:      Mental Status: He is alert and oriented to person, place, and time.   Psychiatric:         Behavior: Behavior normal.         Thought Content: Thought content normal.         Judgment: Judgment normal.            Cardiac Catheterization:    3/5/2024  Left heart catheterization and Left ventriculogram  Angioplasty and placement of 3.0 x 12, 2.75 x 12 mm Synergy drug-eluting stents in distal RCA.  Left main: Mild luminal irregularities, no significant stenosis.  Left anterior descending: Calcified proximal vessel.  Diffuse mild disease.  Apical LAD has focal 60% stenosis.  Diagonals are small vessels, no significant stenosis.  Left circumflex: Gives 2 marginal branches.  Large first marginal branch has prior stent, which is widely patent with good runoff.  No significant disease in left circumflex system.  Right coronary: 99% distal stenosis with STEPHENIE I flow, lesion just prior to bifurcation, dominant   Left Heart Catheterization:  Left " Ventriculogram: 55%, inferior wall hypokinesis  Left Ventricular EDP: 14 mm Hg   Aortic Valve Gradient: No significant AV gradient noted       ECHO  3/6/2024  Normal left ventricular systolic function.  The ejection fraction is measured to be 59% by Cervantes's biplane.  Indeterminate diastolic function.  Poor endocardial definition even with use of echo contrast, unable to   exclude significant wall motion abnormalities.  Upper normal right ventricular size with normal systolic function.  Unable to estimate right ventricular systolic pressure due to an   inadequate tricuspid regurgitant jet.  Mild mitral regurgitation.  The inferior vena cava is not well-visualized.    Cardiac Catheterization   7/7/16  1.  Coronary artery disease including occluded proximal obtuse marginal    branch, additional nonobstructive mid left anterior descending artery    stenosis.  2.  Successful thrombectomy/PTCA/stent placement of the proximal to mid obtuse   marginal branch with 3.0x32-mm Promus PREMIER drug-eluting stent.  3.  Angio-Seal closure.    Lab Results   Component Value Date/Time    CHOLSTRLTOT 105 03/06/2024 03:43 AM    LDL 38 03/06/2024 03:43 AM    HDL 29 (A) 03/06/2024 03:43 AM    TRIGLYCERIDE 189 (H) 03/06/2024 03:43 AM       Lab Results   Component Value Date/Time    SODIUM 134 (L) 03/06/2024 03:43 AM    POTASSIUM 4.2 03/06/2024 03:43 AM    CHLORIDE 99 03/06/2024 03:43 AM    CO2 22 03/06/2024 03:43 AM    GLUCOSE 261 (H) 03/06/2024 03:43 AM    BUN 11 03/06/2024 03:43 AM    CREATININE 0.60 03/06/2024 03:43 AM     Lab Results   Component Value Date/Time    ALKPHOSPHAT 71 03/06/2024 03:43 AM    ASTSGOT 93 (H) 03/06/2024 03:43 AM    ALTSGPT 39 03/06/2024 03:43 AM    TBILIRUBIN 0.8 03/06/2024 03:43 AM          Assessment & Plan     1. Coronary artery disease involving native coronary artery of native heart without angina pectoris  metoprolol SR (TOPROL XL) 50 MG TABLET SR 24 HR    prasugrel (EFFIENT) 10 MG Tab    aspirin 81 MG  EC tablet    rosuvastatin (CRESTOR) 40 MG tablet    Lipid Profile    Comp Metabolic Panel    DISCONTINUED: atorvastatin (LIPITOR) 40 MG Tab      2. Essential hypertension  lisinopril (PRINIVIL) 10 MG Tab      3. Mixed hyperlipidemia  ezetimibe (ZETIA) 10 MG Tab      4. Status post placement of stent in right coronary artery - 3/2024 NSTEMI        5. Type II diabetes mellitus with complication (HCC)  Empagliflozin (JARDIANCE) 25 MG Tab      6. ROSALINO (obstructive sleep apnea)            Medical Decision Making: Today's Assessment/Status/Plan:        CAD S/p PCI to DENISSE distal RCA   Hyperlipidemia   Hypertension   - Pt recovering well post stent placement.  He has not had any recurrence of chest pain or angina.  - He has been compliant with his medications without missed doses.  I discussed mechanism of action and importance of cardiac medications, especially DAPT.    - We reviewed heart healthy, low sodium low cholesterol diet today.  We also reviewed recommended activity guidelines per AHA guidelines.  - Cardiac Rehab consult placed.    - continue to take aspirin and Effient for the next 12 months. Changed metoprolol to succinate 50mg XL, stop the diltiazem  (switched previously due to fatigue but patient is tolerating it this time), jardiance and lisinopril 10mg BID    - previously on rosuvastatin prior to hospitalization, dc on atorvastatin. We will go back to rosuvastatin 40mg qd   - ECHO post PCI 59%    2. Diabetes:  - last glycohemoglobin 10.1  - working on diet modification     3. ROSALINO:  - on cpap     Follow up in 3 months, sooner as needed.

## 2024-04-04 ENCOUNTER — APPOINTMENT (OUTPATIENT)
Dept: MEDICAL GROUP | Facility: PHYSICIAN GROUP | Age: 64
End: 2024-04-04
Payer: COMMERCIAL

## 2024-04-04 VITALS
OXYGEN SATURATION: 95 % | TEMPERATURE: 97.3 F | SYSTOLIC BLOOD PRESSURE: 110 MMHG | DIASTOLIC BLOOD PRESSURE: 70 MMHG | HEART RATE: 80 BPM | BODY MASS INDEX: 32.34 KG/M2 | HEIGHT: 71 IN | WEIGHT: 231 LBS

## 2024-04-04 DIAGNOSIS — E11.8 TYPE II DIABETES MELLITUS WITH COMPLICATION (HCC): ICD-10-CM

## 2024-04-04 PROCEDURE — 3078F DIAST BP <80 MM HG: CPT | Performed by: NURSE PRACTITIONER

## 2024-04-04 PROCEDURE — 99214 OFFICE O/P EST MOD 30 MIN: CPT | Performed by: NURSE PRACTITIONER

## 2024-04-04 PROCEDURE — 3074F SYST BP LT 130 MM HG: CPT | Performed by: NURSE PRACTITIONER

## 2024-04-04 RX ORDER — LANCETS 30 GAUGE
EACH MISCELLANEOUS
Qty: 100 EACH | Refills: 0 | Status: SHIPPED | OUTPATIENT
Start: 2024-04-04 | End: 2024-04-11 | Stop reason: SDUPTHER

## 2024-04-04 ASSESSMENT — ENCOUNTER SYMPTOMS
PSYCHIATRIC NEGATIVE: 1
SHORTNESS OF BREATH: 0
CONSTITUTIONAL NEGATIVE: 1
PALPITATIONS: 0
NEUROLOGICAL NEGATIVE: 1
MUSCULOSKELETAL NEGATIVE: 1
COUGH: 0
FEVER: 0
GASTROINTESTINAL NEGATIVE: 1
EYES NEGATIVE: 1
SPUTUM PRODUCTION: 0

## 2024-04-04 ASSESSMENT — FIBROSIS 4 INDEX: FIB4 SCORE: 4.26

## 2024-04-04 NOTE — PROGRESS NOTES
Subjective       CC:   Chief Complaint   Patient presents with    Diabetes     Needs supplies, One touch Verio flex         HPI:   Patient is a 63 y.o. established male patient with medical history listed below here today for evaluation and management of diabetes. I last saw him in 3/2023. He had been off Jardiance for a while, states was not making a difference for him.  Recently reordered by cardiology after his stent placement a few weeks ago. He was also started on sliding scale insulin while in hospital.     He was recently in ER on 3/5/2024 for chest pain. EKG NSR, Q waves in inferior leads, no acute ischemia. Chest x-ray with evidence of mild pulmonary edema, no evidence of pulmonary infiltrate. Troponin peak of 360. Cardiology consulted, patient underwent cardiac catheterization on 3/5, noted 99% distal stenosis in the RCA, with successful PCI x2. TTE noted LVEF of 60%, mild MR.   He had follow up with cardiology on 3/20/2024. No recurrent chest pain since stent placement. He is to continue aspirin and Effient for the next 12 months. Diltiazem was d/laura and he is currently on Metoprolol XL 50mg QD, Lisinopril 40mg BID. He is also on Rosuvastatin 40mg QHS, Zetia 10mg QD.     Problem   Type II Diabetes Mellitus With Complication (Hcc)    Taking Jardiance 25mg QD (restarted 3/20/2024); Novolog sliding scale (started 3/5/2024)    ; microalbum creat ratio 14  HTN managed on Lisinopril, Metoprolol  Dyslipidemia managed on Rosuvastatin 40mg, Zetia 10mg   Normal monofilament today  Retinal Screen: 8/2021 at the [facility], no retinopathy; was told he is good for a few years. Repeat screen today         Patient Active Problem List   Diagnosis    History of ST elevation myocardial infarction (STEMI)    Coronary artery disease involving native coronary artery of native heart without angina pectoris    HLD (hyperlipidemia)    VT (ventricular tachycardia) (HCC)    Cigar smoker    Anxiety    Abdominal gas pain     Preventative health care    Obesity (BMI 30-39.9)    Cough    Type II diabetes mellitus with complication (HCC)    ROSALINO (obstructive sleep apnea)    Former smoker    Biliary colic    Vasculogenic erectile dysfunction    Essential hypertension    History of nocturia    Toenail deformity    Nasal congestion    NSTEMI (non-ST elevated myocardial infarction) (HCC)    Status post placement of stent in right coronary artery - 3/2024 NSTEMI       Past Medical History:   Diagnosis Date    Anxiety     Bronchitis     CAD (coronary artery disease)     STEMI-S/P DENISSE to OM    Chickenpox     Coronary heart disease     Depression     [place] measles     Hyperlipidemia     Hypertension     Influenza     Obesity     Sleep apnea     Status post placement of stent in right coronary artery - 3/2024 NSTEMI 03/05/2024    Ventricular tachycardia (HCC)     post STEMI     Whooping cough         Past Surgical History:   Procedure Laterality Date    ANGIOPLASTY      APPENDECTOMY CHILD      TONSILLECTOMY      ZKENDAL CARDIAC CATH          Current Outpatient Medications on File Prior to Visit   Medication Sig Dispense Refill    Melatonin 10 MG Chew Tab Chew.      metoprolol SR (TOPROL XL) 50 MG TABLET SR 24 HR Take 1 Tablet by mouth every evening. 90 Tablet 3    prasugrel (EFFIENT) 10 MG Tab Take 1 Tablet by mouth every day. 90 Tablet 3    lisinopril (PRINIVIL) 10 MG Tab Take 1 Tablet by mouth 2 times a day. 180 Tablet 3    ezetimibe (ZETIA) 10 MG Tab Take 1 Tablet by mouth every day. 90 Tablet 3    aspirin 81 MG EC tablet Take 1 Tablet by mouth every day. 90 Tablet 3    Empagliflozin (JARDIANCE) 25 MG Tab Take 1 tablet by mouth every day. 90 Tablet 3    rosuvastatin (CRESTOR) 40 MG tablet Take 1 Tablet by mouth every day. 90 Tablet 3    Blood Glucose Monitoring Suppl (BLOOD GLUCOSE MONITOR SYSTEM) w/Device Kit Test blood sugar as recommended by provider. 1 Kit 0    glucose blood strip Use one strip to test blood sugar three times daily before meals.  "100 Strip 0    Alcohol Swabs Wipe site with prep pad prior to injection. 100 Each 0    insulin aspart (NOVOLOG FLEXPEN) 100 UNIT/ML injection PEN Inject 2-9 Units under the skin 3 times a day before meals for 60 days. 70   - 150  mg/dL =      0 Units  151 - 200  mg/dL =    2 Units  201 - 250  mg/dL =    3 Units  251 - 300  mg/dL  =   5 Units  301 - 350  mg/dL  =   6 Units  351 - 400 mg/dL   =   8 Units  Over 400 mg/dL   =   9 Units 18 mL 0    diphenhydrAMINE-APAP, sleep, (TYLENOL PM EXTRA STRENGTH)  MG Tab Take 2 Tablets by mouth at bedtime.      acetaminophen (TYLENOL) 500 MG Tab Take 500 mg by mouth every evening.      NON SPECIFIED Allergy shots every month with Dr. Rae      Azelastine HCl (ASTEPRO NA) Administer 2 Sprays into each nostril at bedtime.      Calcium Carbonate Antacid (SHAMA-SELTZER ANTACID PO) Take 1 Tablet by mouth at bedtime.       No current facility-administered medications on file prior to visit.        ROS:  Review of Systems   Constitutional: Negative.  Negative for fever and malaise/fatigue.   HENT: Negative.     Eyes: Negative.    Respiratory:  Negative for cough, sputum production and shortness of breath.    Cardiovascular:  Negative for chest pain, palpitations and leg swelling.   Gastrointestinal: Negative.    Genitourinary: Negative.    Musculoskeletal: Negative.    Neurological: Negative.    Endo/Heme/Allergies: Negative.    Psychiatric/Behavioral: Negative.         Objective       Exam:  /70 (BP Location: Left arm, Patient Position: Sitting, BP Cuff Size: Adult)   Pulse 80   Temp 36.3 °C (97.3 °F) (Temporal)   Ht 1.803 m (5' 11\")   Wt 105 kg (231 lb)   SpO2 95%   BMI 32.22 kg/m²  Body mass index is 32.22 kg/m².    Physical Exam  Constitutional:       Appearance: Normal appearance.   Neurological:      Mental Status: He is alert.       Monofilament testing with a 10 gram force: sensation intact: intact bilaterally  Visual Inspection: Feet without maceration, ulcers, " fissures.  Pedal pulses: intact bilaterally      Labs: reviewed    Assessment & Plan       63 y.o. male with the following -     Problem List Items Addressed This Visit       Type II diabetes mellitus with complication (HCC)     Chronic; A1C goal < 6.5  Lost to care the past year. Was on doing okay on Jardiance 10mg 2 years ago. This was his first medication with elevated A1C (no metformin), he has history of stent placement and Jardiance was selected for cardiovascular benefits. States he stopped taking medication about a year ago, he did not feel like it was making a difference. He recently underwent another stent placement on 3/5/2024 and restarted on Jardiance 25mg by cardiology. He was also started on sliding scale Novolog in the hospital. Yesterday pre-lunch BG was 165 and he took 2 units.  A1C 10.1 on 3/5/2024 at most recent hospitalization for chest pain.   - continue Jardiance 25mg QD;  - continue Novolog  Inject 2-9 Units under the skin 3 times a day before meals for 60 days. 70 - 150 mg/dL = 0 Units   151 - 200 mg/dL = 2 Units   201 - 250 mg/dL = 3 Units   251 - 300 mg/dL = 5 Units   301 - 350 mg/dL = 6 Units   351 - 400 mg/dL = 8 Units   Over 400 mg/dL = 9 Units     - discussed switching to weekly GLP-1 and adding oral metformin. Also recommended referral to endocrinology, pharm for closer management. He declined any new changes at this time. States he is working on his dietary intake and wants to see where his next A1C will be before changing any medications.    - recheck A1C in 3 months          Relevant Medications    Insulin Pen Needle 32 G x 4 mm    Lancets    Other Relevant Orders    POCT Retinal Eye Exam    HEMOGLOBIN A1C    Diabetic Monofilament LE Exam (Completed)     Educated in proper administration of medication(s) ordered today including safety, possible SE, risks, benefits, rationale and alternatives to therapy.     Return in about 3 months (around 7/4/2024) for establish care,  dm2.    Please note that this dictation was created using voice recognition software. I have made every reasonable attempt to correct obvious errors, but I expect that there are errors of grammar and possibly content that I did not discover before finalizing the note.

## 2024-04-04 NOTE — ASSESSMENT & PLAN NOTE
Chronic; A1C goal < 6.5  Lost to care the past year. Was on doing okay on Jardiance 10mg 2 years ago. This was his first medication with elevated A1C (no metformin), he has history of stent placement and Jardiance was selected for cardiovascular benefits. States he stopped taking medication about a year ago, he did not feel like it was making a difference. He recently underwent another stent placement on 3/5/2024 and restarted on Jardiance 25mg by cardiology. He was also started on sliding scale Novolog in the hospital. Yesterday pre-lunch BG was 165 and he took 2 units.  A1C 10.1 on 3/5/2024 at most recent hospitalization for chest pain.   - continue Jardiance 25mg QD;  - continue Novolog  Inject 2-9 Units under the skin 3 times a day before meals for 60 days. 70 - 150 mg/dL = 0 Units   151 - 200 mg/dL = 2 Units   201 - 250 mg/dL = 3 Units   251 - 300 mg/dL = 5 Units   301 - 350 mg/dL = 6 Units   351 - 400 mg/dL = 8 Units   Over 400 mg/dL = 9 Units     - discussed switching to weekly GLP-1 and adding oral metformin. Also recommended referral to endocrinology, pharm for closer management. He declined any new changes at this time. States he is working on his dietary intake and wants to see where his next A1C will be before changing any medications.    - recheck A1C in 3 months

## 2024-04-11 DIAGNOSIS — E11.8 TYPE II DIABETES MELLITUS WITH COMPLICATION (HCC): ICD-10-CM

## 2024-04-11 RX ORDER — LANCETS 30 GAUGE
EACH MISCELLANEOUS
Qty: 100 EACH | Refills: 0 | Status: SHIPPED | OUTPATIENT
Start: 2024-04-11

## 2024-04-11 NOTE — TELEPHONE ENCOUNTER
Received request via: Pharmacy    Was the patient seen in the last year in this department? Yes    Does the patient have an active prescription (recently filled or refills available) for medication(s) requested? No        Does the patient have snf Plus and need 100 day supply (blood pressure, diabetes and cholesterol meds only)? Patient does not have SCP

## 2024-05-02 LAB — RETINAL SCREEN: NEGATIVE

## 2024-05-15 ENCOUNTER — DOCUMENTATION (OUTPATIENT)
Dept: HEALTH INFORMATION MANAGEMENT | Facility: OTHER | Age: 64
End: 2024-05-15
Payer: COMMERCIAL

## 2024-06-10 ENCOUNTER — HOSPITAL ENCOUNTER (OUTPATIENT)
Dept: LAB | Facility: MEDICAL CENTER | Age: 64
End: 2024-06-10
Attending: NURSE PRACTITIONER
Payer: COMMERCIAL

## 2024-06-10 DIAGNOSIS — I25.10 CORONARY ARTERY DISEASE INVOLVING NATIVE CORONARY ARTERY OF NATIVE HEART WITHOUT ANGINA PECTORIS: ICD-10-CM

## 2024-06-10 DIAGNOSIS — E11.8 TYPE II DIABETES MELLITUS WITH COMPLICATION (HCC): ICD-10-CM

## 2024-06-10 LAB
ALBUMIN SERPL BCP-MCNC: 4.6 G/DL (ref 3.2–4.9)
ALBUMIN/GLOB SERPL: 1.9 G/DL
ALP SERPL-CCNC: 66 U/L (ref 30–99)
ALT SERPL-CCNC: 47 U/L (ref 2–50)
ANION GAP SERPL CALC-SCNC: 11 MMOL/L (ref 7–16)
AST SERPL-CCNC: 27 U/L (ref 12–45)
BILIRUB SERPL-MCNC: 0.5 MG/DL (ref 0.1–1.5)
BUN SERPL-MCNC: 15 MG/DL (ref 8–22)
CALCIUM ALBUM COR SERPL-MCNC: 9.1 MG/DL (ref 8.5–10.5)
CALCIUM SERPL-MCNC: 9.6 MG/DL (ref 8.5–10.5)
CHLORIDE SERPL-SCNC: 104 MMOL/L (ref 96–112)
CHOLEST SERPL-MCNC: 109 MG/DL (ref 100–199)
CO2 SERPL-SCNC: 24 MMOL/L (ref 20–33)
CREAT SERPL-MCNC: 0.75 MG/DL (ref 0.5–1.4)
EST. AVERAGE GLUCOSE BLD GHB EST-MCNC: 137 MG/DL
FASTING STATUS PATIENT QL REPORTED: NORMAL
GFR SERPLBLD CREATININE-BSD FMLA CKD-EPI: 102 ML/MIN/1.73 M 2
GLOBULIN SER CALC-MCNC: 2.4 G/DL (ref 1.9–3.5)
GLUCOSE SERPL-MCNC: 140 MG/DL (ref 65–99)
HBA1C MFR BLD: 6.4 % (ref 4–5.6)
HDLC SERPL-MCNC: 35 MG/DL
LDLC SERPL CALC-MCNC: 51 MG/DL
POTASSIUM SERPL-SCNC: 5 MMOL/L (ref 3.6–5.5)
PROT SERPL-MCNC: 7 G/DL (ref 6–8.2)
SODIUM SERPL-SCNC: 139 MMOL/L (ref 135–145)
TRIGL SERPL-MCNC: 114 MG/DL (ref 0–149)

## 2024-06-10 PROCEDURE — 36415 COLL VENOUS BLD VENIPUNCTURE: CPT

## 2024-06-10 PROCEDURE — 83036 HEMOGLOBIN GLYCOSYLATED A1C: CPT

## 2024-06-10 PROCEDURE — 80053 COMPREHEN METABOLIC PANEL: CPT

## 2024-06-10 PROCEDURE — 80061 LIPID PANEL: CPT

## 2024-06-24 ENCOUNTER — OFFICE VISIT (OUTPATIENT)
Dept: CARDIOLOGY | Facility: MEDICAL CENTER | Age: 64
End: 2024-06-24
Attending: INTERNAL MEDICINE
Payer: COMMERCIAL

## 2024-06-24 VITALS
OXYGEN SATURATION: 96 % | SYSTOLIC BLOOD PRESSURE: 106 MMHG | DIASTOLIC BLOOD PRESSURE: 74 MMHG | WEIGHT: 227 LBS | BODY MASS INDEX: 31.78 KG/M2 | HEART RATE: 75 BPM | HEIGHT: 71 IN

## 2024-06-24 DIAGNOSIS — I10 ESSENTIAL HYPERTENSION: ICD-10-CM

## 2024-06-24 DIAGNOSIS — I25.10 CORONARY ARTERY DISEASE INVOLVING NATIVE CORONARY ARTERY OF NATIVE HEART WITHOUT ANGINA PECTORIS: ICD-10-CM

## 2024-06-24 DIAGNOSIS — Z95.5 STATUS POST PLACEMENT OF STENT IN RIGHT CORONARY ARTERY: ICD-10-CM

## 2024-06-24 DIAGNOSIS — E78.2 MIXED HYPERLIPIDEMIA: ICD-10-CM

## 2024-06-24 DIAGNOSIS — E11.8 TYPE II DIABETES MELLITUS WITH COMPLICATION (HCC): ICD-10-CM

## 2024-06-24 DIAGNOSIS — G47.33 OSA (OBSTRUCTIVE SLEEP APNEA): ICD-10-CM

## 2024-06-24 PROCEDURE — 99213 OFFICE O/P EST LOW 20 MIN: CPT | Performed by: INTERNAL MEDICINE

## 2024-06-24 PROCEDURE — 3074F SYST BP LT 130 MM HG: CPT | Performed by: INTERNAL MEDICINE

## 2024-06-24 PROCEDURE — 3078F DIAST BP <80 MM HG: CPT | Performed by: INTERNAL MEDICINE

## 2024-06-24 PROCEDURE — 99214 OFFICE O/P EST MOD 30 MIN: CPT | Performed by: INTERNAL MEDICINE

## 2024-06-24 RX ORDER — COLCHICINE 0.6 MG/1
0.6 TABLET ORAL DAILY
Qty: 90 TABLET | Refills: 3 | Status: SHIPPED | OUTPATIENT
Start: 2024-06-24

## 2024-06-24 ASSESSMENT — FIBROSIS 4 INDEX: FIB4 SCORE: 1.13

## 2024-06-24 NOTE — PROGRESS NOTES
Subjective:   Yves Ivory is a 63y .o. male who presents today for follow-up of CAD hypertension and hyperlipidemia.    Hx of anxiety, HLD, obesity, HTN, anxiety, depression, smoking hx, and now CAD s/p STEMI with DENISSE to OM, VF arrest.    Since last visit unfortunately was shoveling snow several months ago and experienced acute substernal chest discomfort and epigastric discomfort he thought initially it was GERD but turned out to be inferior AMI status post DENISSE x 2 to the RCA with preserved LV function and recovered symptoms.  Feels better more quickly than after his initial heart attack prior.  On good medical therapy.    Past Medical History:   Diagnosis Date    Anxiety     Bronchitis     CAD (coronary artery disease)     STEMI-S/P DENISSE to OM    Chickenpox     Coronary heart disease     Depression     Anguillan measles     Hyperlipidemia     Hypertension     Influenza     Obesity     Sleep apnea     Status post placement of stent in right coronary artery - 3/2024 NSTEMI 2024    Ventricular tachycardia (HCC)     post STEMI     Whooping cough      Past Surgical History:   Procedure Laterality Date    ANGIOPLASTY      APPENDECTOMY CHILD      TONSILLECTOMY      ZZZ CARDIAC CATH       Family History   Problem Relation Age of Onset    Cancer Mother         breast cancer    Heart Disease Father 38        heart transplant, 3 MI's, multiple bypass    Stroke Maternal Grandmother      Social History     Tobacco Use   Smoking Status Former    Current packs/day: 0.00    Average packs/day: 1 pack/day for 40.0 years (40.0 ttl pk-yrs)    Types: Cigars, Cigarettes    Start date:     Quit date: 2016    Years since quittin.4   Smokeless Tobacco Former    Types: Chew    Quit date:    Tobacco Comments    1 - 2 cigars on the weeknds      Allergies   Allergen Reactions    Brilinta [Ticagrelor] Shortness of Breath     Med put patient in ER twice     Outpatient Encounter Medications as of 2024   Medication Sig  "Dispense Refill    colchicine (COLCRYS) 0.6 MG Tab Take 1 Tablet by mouth every day. 90 Tablet 3    Lancets Use one lancet to test blood sugar three times daily before meals. 100 Each 0    Melatonin 10 MG Chew Tab Chew.      Insulin Pen Needle 32 G x 4 mm Use one pen needle in pen device to inject insulin three times daily. 100 Each 0    metoprolol SR (TOPROL XL) 50 MG TABLET SR 24 HR Take 1 Tablet by mouth every evening. 90 Tablet 3    prasugrel (EFFIENT) 10 MG Tab Take 1 Tablet by mouth every day. 90 Tablet 3    lisinopril (PRINIVIL) 10 MG Tab Take 1 Tablet by mouth 2 times a day. 180 Tablet 3    ezetimibe (ZETIA) 10 MG Tab Take 1 Tablet by mouth every day. 90 Tablet 3    aspirin 81 MG EC tablet Take 1 Tablet by mouth every day. 90 Tablet 3    Empagliflozin (JARDIANCE) 25 MG Tab Take 1 tablet by mouth every day. 90 Tablet 3    rosuvastatin (CRESTOR) 40 MG tablet Take 1 Tablet by mouth every day. 90 Tablet 3    Blood Glucose Monitoring Suppl (BLOOD GLUCOSE MONITOR SYSTEM) w/Device Kit Test blood sugar as recommended by provider. 1 Kit 0    glucose blood strip Use one strip to test blood sugar three times daily before meals. 100 Strip 0    Alcohol Swabs Wipe site with prep pad prior to injection. 100 Each 0    diphenhydrAMINE-APAP, sleep, (TYLENOL PM EXTRA STRENGTH)  MG Tab Take 2 Tablets by mouth at bedtime.      acetaminophen (TYLENOL) 500 MG Tab Take 500 mg by mouth every evening.      NON SPECIFIED Allergy shots every month with Dr. Rae      Azelastine HCl (ASTEPRO NA) Administer 2 Sprays into each nostril at bedtime.      Calcium Carbonate Antacid (SHAMA-SELTZER ANTACID PO) Take 1 Tablet by mouth at bedtime.       No facility-administered encounter medications on file as of 6/24/2024.     Review of Systems   All other systems reviewed and are negative.       Objective:   /74 (BP Location: Left arm, Patient Position: Sitting, BP Cuff Size: Adult)   Pulse 75   Ht 1.803 m (5' 11\")   Wt 103 kg (227 " lb)   SpO2 96%   BMI 31.66 kg/m²     Physical Exam  Vitals reviewed.   Constitutional:       General: He is not in acute distress.     Appearance: He is well-developed. He is not diaphoretic.   HENT:      Head: Normocephalic and atraumatic.      Right Ear: External ear normal.      Left Ear: External ear normal.   Eyes:      General: No scleral icterus.        Right eye: No discharge.         Left eye: No discharge.      Conjunctiva/sclera: Conjunctivae normal.      Pupils: Pupils are equal, round, and reactive to light.   Neck:      Thyroid: No thyromegaly.      Vascular: No JVD.      Trachea: No tracheal deviation.   Cardiovascular:      Rate and Rhythm: Normal rate and regular rhythm.      Chest Wall: PMI is not displaced.      Pulses:           Carotid pulses are 2+ on the right side and 2+ on the left side.       Radial pulses are 2+ on the left side.        Popliteal pulses are 2+ on the right side and 2+ on the left side.        Dorsalis pedis pulses are 2+ on the right side and 2+ on the left side.        Posterior tibial pulses are 2+ on the right side and 2+ on the left side.      Heart sounds: No murmur heard.     No friction rub. No gallop.   Pulmonary:      Effort: Pulmonary effort is normal. No respiratory distress.      Breath sounds: Normal breath sounds. No wheezing or rales.   Chest:      Chest wall: No tenderness.   Abdominal:      General: Bowel sounds are normal. There is no distension.      Palpations: Abdomen is soft.      Tenderness: There is no abdominal tenderness.   Musculoskeletal:         General: No tenderness or deformity. Normal range of motion.      Cervical back: Normal range of motion and neck supple.   Skin:     General: Skin is warm and dry.      Coloration: Skin is not pale.      Findings: No erythema or rash.   Neurological:      Mental Status: He is alert and oriented to person, place, and time.      Cranial Nerves: No cranial nerve deficit (cranial nerves II through XII  grossly intact).      Coordination: Coordination normal.   Psychiatric:         Behavior: Behavior normal.         Thought Content: Thought content normal.       7/7/16 ANGIOGRAM POSTPROCEDURE DIAGNOSES:  1.  Coronary artery disease including occluded proximal obtuse marginal    branch, additional nonobstructive mid left anterior descending artery    stenosis.  2.  Successful thrombectomy/PTCA/stent placement of the proximal to mid obtuse   marginal branch with 3.0x32-mm Promus PREMIER drug-eluting stent.  3.  Angio-Seal closure.    7/8/16 ECHO CONCLUSIONS  Left ventricular ejection fraction is visually estimated to be 55%,   inferior hypokinesis.  Grade II-III diastolic dysfunction.  Mild concentric left ventricular   hypertrophy.  Dilated inferior vena cava with inspiratory collapse.  No significant valve disease or flow abnormalities.   No prior study is available for comparison.     Lab Results   Component Value Date/Time    WBC 9.6 03/06/2024 03:43 AM    RBC 4.82 03/06/2024 03:43 AM    HEMOGLOBIN 14.5 03/06/2024 03:43 AM    HEMATOCRIT 41.3 (L) 03/06/2024 03:43 AM    MCV 85.7 03/06/2024 03:43 AM    MCH 30.1 03/06/2024 03:43 AM    MCHC 35.1 03/06/2024 03:43 AM    MPV 9.9 03/06/2024 03:43 AM      Lab Results   Component Value Date/Time    CHOLSTRLTOT 109 06/10/2024 06:40 AM    LDL 51 06/10/2024 06:40 AM    HDL 35 (A) 06/10/2024 06:40 AM    TRIGLYCERIDE 114 06/10/2024 06:40 AM       Lab Results   Component Value Date/Time    SODIUM 139 06/10/2024 06:40 AM    POTASSIUM 5.0 06/10/2024 06:40 AM    CHLORIDE 104 06/10/2024 06:40 AM    CO2 24 06/10/2024 06:40 AM    GLUCOSE 140 (H) 06/10/2024 06:40 AM    BUN 15 06/10/2024 06:40 AM    CREATININE 0.75 06/10/2024 06:40 AM     Lab Results   Component Value Date/Time    ALKPHOSPHAT 66 06/10/2024 06:40 AM    ASTSGOT 27 06/10/2024 06:40 AM    ALTSGPT 47 06/10/2024 06:40 AM    TBILIRUBIN 0.5 06/10/2024 06:40 AM      Lab Results   Component Value Date/Time    BNPBTYPENAT 6  10/06/2016 02:20 PM        Imaging reviewed      Assessment:     1. Coronary artery disease involving native coronary artery of native heart without angina pectoris        2. Essential hypertension        3. Status post placement of stent in right coronary artery  colchicine (COLCRYS) 0.6 MG Tab      4. Type II diabetes mellitus with complication (HCC)        5. Mixed hyperlipidemia        6. ROSALINO (obstructive sleep apnea)            Medical Decision Making:  Today's Assessment / Status / Plan:     AMI with preserved EF post procedure with 2 new stents in his RCA for his acute plaque rupture.  Lipid profile is excellent with an LDL far below 70 nearing 50, taking his medication regularly.  Recommend DAPT for 1 year previously was on aspirin monotherapy and I would recommend to downgrade after 1 year to Plavix monotherapy rather than aspirin.  We also discussed colchicine and I will initiate this for secondary prevention.  Continue his other medical therapy.  Will follow-up routinely.    We added Colchicine 0.6 mg daily based on the [employer] trial from 2020 which found 30% less risk of heart attack in 2.5 years of follow up.  The main side effects are diarrhea or muscle aches.      () Today's E/M visit is associated with medical care services that serve as the continuing focal point for all needed health care services and/or with medical care services that  are part of ongoing care related to a patient's single, serious condition, or a complex condition: This includes  furnishing services to patients on an ongoing basis that result in care that is personalized  to the patient. The services result in a comprehensive, longitudinal, and continuous  relationship with the patient and involve delivery of team-based care that is accessible, coordinated with other practitioners and providers, and integrated with the broader health  care landscape.

## 2024-07-10 SDOH — ECONOMIC STABILITY: HOUSING INSECURITY: IN THE LAST 12 MONTHS, HOW MANY PLACES HAVE YOU LIVED?: 1

## 2024-07-10 SDOH — HEALTH STABILITY: PHYSICAL HEALTH: ON AVERAGE, HOW MANY MINUTES DO YOU ENGAGE IN EXERCISE AT THIS LEVEL?: 0 MIN

## 2024-07-10 SDOH — ECONOMIC STABILITY: INCOME INSECURITY: IN THE LAST 12 MONTHS, WAS THERE A TIME WHEN YOU WERE NOT ABLE TO PAY THE MORTGAGE OR RENT ON TIME?: NO

## 2024-07-10 SDOH — HEALTH STABILITY: PHYSICAL HEALTH: ON AVERAGE, HOW MANY DAYS PER WEEK DO YOU ENGAGE IN MODERATE TO STRENUOUS EXERCISE (LIKE A BRISK WALK)?: 0 DAYS

## 2024-07-10 SDOH — ECONOMIC STABILITY: FOOD INSECURITY: WITHIN THE PAST 12 MONTHS, YOU WORRIED THAT YOUR FOOD WOULD RUN OUT BEFORE YOU GOT MONEY TO BUY MORE.: NEVER TRUE

## 2024-07-10 SDOH — ECONOMIC STABILITY: INCOME INSECURITY: HOW HARD IS IT FOR YOU TO PAY FOR THE VERY BASICS LIKE FOOD, HOUSING, MEDICAL CARE, AND HEATING?: NOT HARD AT ALL

## 2024-07-10 SDOH — ECONOMIC STABILITY: FOOD INSECURITY: WITHIN THE PAST 12 MONTHS, THE FOOD YOU BOUGHT JUST DIDN'T LAST AND YOU DIDN'T HAVE MONEY TO GET MORE.: NEVER TRUE

## 2024-07-10 SDOH — HEALTH STABILITY: MENTAL HEALTH
STRESS IS WHEN SOMEONE FEELS TENSE, NERVOUS, ANXIOUS, OR CAN'T SLEEP AT NIGHT BECAUSE THEIR MIND IS TROUBLED. HOW STRESSED ARE YOU?: RATHER MUCH

## 2024-07-10 ASSESSMENT — SOCIAL DETERMINANTS OF HEALTH (SDOH)
HOW HARD IS IT FOR YOU TO PAY FOR THE VERY BASICS LIKE FOOD, HOUSING, MEDICAL CARE, AND HEATING?: NOT HARD AT ALL
HOW OFTEN DO YOU ATTENT MEETINGS OF THE CLUB OR ORGANIZATION YOU BELONG TO?: NEVER
HOW OFTEN DO YOU GET TOGETHER WITH FRIENDS OR RELATIVES?: NEVER
HOW MANY DRINKS CONTAINING ALCOHOL DO YOU HAVE ON A TYPICAL DAY WHEN YOU ARE DRINKING: 7 TO 9
HOW OFTEN DO YOU HAVE SIX OR MORE DRINKS ON ONE OCCASION: WEEKLY
IN THE PAST 12 MONTHS, HAS THE ELECTRIC, GAS, OIL, OR WATER COMPANY THREATENED TO SHUT OFF SERVICE IN YOUR HOME?: NO
HOW OFTEN DO YOU ATTENT MEETINGS OF THE CLUB OR ORGANIZATION YOU BELONG TO?: NEVER
DO YOU BELONG TO ANY CLUBS OR ORGANIZATIONS SUCH AS CHURCH GROUPS UNIONS, FRATERNAL OR ATHLETIC GROUPS, OR SCHOOL GROUPS?: NO
DO YOU BELONG TO ANY CLUBS OR ORGANIZATIONS SUCH AS CHURCH GROUPS UNIONS, FRATERNAL OR ATHLETIC GROUPS, OR SCHOOL GROUPS?: NO
HOW OFTEN DO YOU HAVE A DRINK CONTAINING ALCOHOL: 2-4 TIMES A MONTH
HOW OFTEN DO YOU GET TOGETHER WITH FRIENDS OR RELATIVES?: NEVER
HOW OFTEN DO YOU ATTEND CHURCH OR RELIGIOUS SERVICES?: NEVER
IN A TYPICAL WEEK, HOW MANY TIMES DO YOU TALK ON THE PHONE WITH FAMILY, FRIENDS, OR NEIGHBORS?: ONCE A WEEK
WITHIN THE PAST 12 MONTHS, YOU WORRIED THAT YOUR FOOD WOULD RUN OUT BEFORE YOU GOT THE MONEY TO BUY MORE: NEVER TRUE
IN A TYPICAL WEEK, HOW MANY TIMES DO YOU TALK ON THE PHONE WITH FAMILY, FRIENDS, OR NEIGHBORS?: ONCE A WEEK
HOW OFTEN DO YOU ATTEND CHURCH OR RELIGIOUS SERVICES?: NEVER

## 2024-07-10 ASSESSMENT — LIFESTYLE VARIABLES
HOW MANY STANDARD DRINKS CONTAINING ALCOHOL DO YOU HAVE ON A TYPICAL DAY: 7 TO 9
HOW OFTEN DO YOU HAVE SIX OR MORE DRINKS ON ONE OCCASION: WEEKLY
HOW OFTEN DO YOU HAVE A DRINK CONTAINING ALCOHOL: 2-4 TIMES A MONTH
AUDIT-C TOTAL SCORE: 8
SKIP TO QUESTIONS 9-10: 0

## 2024-07-11 ENCOUNTER — OFFICE VISIT (OUTPATIENT)
Dept: MEDICAL GROUP | Facility: PHYSICIAN GROUP | Age: 64
End: 2024-07-11
Payer: COMMERCIAL

## 2024-07-11 VITALS
TEMPERATURE: 98 F | BODY MASS INDEX: 31.5 KG/M2 | WEIGHT: 225 LBS | HEIGHT: 71 IN | HEART RATE: 81 BPM | SYSTOLIC BLOOD PRESSURE: 112 MMHG | DIASTOLIC BLOOD PRESSURE: 60 MMHG | OXYGEN SATURATION: 95 %

## 2024-07-11 DIAGNOSIS — E78.2 MIXED HYPERLIPIDEMIA: ICD-10-CM

## 2024-07-11 DIAGNOSIS — I10 ESSENTIAL HYPERTENSION: ICD-10-CM

## 2024-07-11 DIAGNOSIS — E11.8 TYPE II DIABETES MELLITUS WITH COMPLICATION (HCC): ICD-10-CM

## 2024-07-11 DIAGNOSIS — Z76.89 ENCOUNTER TO ESTABLISH CARE: ICD-10-CM

## 2024-07-11 DIAGNOSIS — G47.33 OSA (OBSTRUCTIVE SLEEP APNEA): ICD-10-CM

## 2024-07-11 DIAGNOSIS — F17.211 CIGARETTE NICOTINE DEPENDENCE IN REMISSION: ICD-10-CM

## 2024-07-11 DIAGNOSIS — I25.2 HISTORY OF ST ELEVATION MYOCARDIAL INFARCTION (STEMI): ICD-10-CM

## 2024-07-11 PROBLEM — R09.81 NASAL CONGESTION: Status: RESOLVED | Noted: 2022-10-12 | Resolved: 2024-07-11

## 2024-07-11 PROBLEM — K80.50 BILIARY COLIC: Status: RESOLVED | Noted: 2020-01-10 | Resolved: 2024-07-11

## 2024-07-11 PROBLEM — Z87.898 HISTORY OF NOCTURIA: Status: RESOLVED | Noted: 2022-04-14 | Resolved: 2024-07-11

## 2024-07-11 PROBLEM — Z87.891 FORMER SMOKER: Status: RESOLVED | Noted: 2019-04-25 | Resolved: 2024-07-11

## 2024-07-11 PROCEDURE — 3078F DIAST BP <80 MM HG: CPT | Performed by: STUDENT IN AN ORGANIZED HEALTH CARE EDUCATION/TRAINING PROGRAM

## 2024-07-11 PROCEDURE — 99214 OFFICE O/P EST MOD 30 MIN: CPT | Performed by: STUDENT IN AN ORGANIZED HEALTH CARE EDUCATION/TRAINING PROGRAM

## 2024-07-11 PROCEDURE — 3074F SYST BP LT 130 MM HG: CPT | Performed by: STUDENT IN AN ORGANIZED HEALTH CARE EDUCATION/TRAINING PROGRAM

## 2024-07-11 ASSESSMENT — ENCOUNTER SYMPTOMS
FEVER: 0
CHILLS: 0
SHORTNESS OF BREATH: 0
PALPITATIONS: 0

## 2024-07-11 ASSESSMENT — FIBROSIS 4 INDEX: FIB4 SCORE: 1.13

## 2024-07-19 ENCOUNTER — TELEPHONE (OUTPATIENT)
Dept: HEALTH INFORMATION MANAGEMENT | Facility: OTHER | Age: 64
End: 2024-07-19
Payer: COMMERCIAL

## 2024-08-02 ENCOUNTER — TELEPHONE (OUTPATIENT)
Dept: HEALTH INFORMATION MANAGEMENT | Facility: OTHER | Age: 64
End: 2024-08-02
Payer: COMMERCIAL

## 2024-08-02 NOTE — TELEPHONE ENCOUNTER
Eligibility Screening Questions & Answer (LUNG CANCER SCREENING)    1. Age 50-77 yrs of age? 64  yrs old  (age of patient)     2. Total Years Smoking cigarettes: 43   yrs of Smoking  (# yrs total smoking)   NOTE: STARTED SMOKING AT AGE 14  QUIT 2017 AT 57 YRS OLD     3. 20 pack year hx of smoking, or greater (average packs per day)? 1pks / PER DAY  (# yrs total smoking  1 packs per day X 43  YRS PCK =43  Packs year Smoking  (may be multiple calculations= packs years of smoking     4. Current smoker or if quit, has pt quit within last 15 yrs? FORMER  QUIT SMOKING  2017= 7 YRS AGO    5. Completed Lung Cancer screen CT with [facility] previously?  NO  { Anything noted on previous CT involving lungs? ([place], [place], Etc.)   (Yes- detailed notes- date of CT or No or N/A)  No    7. Any signs or symptoms of lung cancer? ( New / change to Cough, Wheezing, S.O.B., coughing up blood, unexplained weight loss within last year)?   ( Yes - Details or NONE or N/A) No     8. Previous history of lung cancer? NONE  (Yes- Details including dates or NONE or N/A)

## 2024-09-03 NOTE — PROGRESS NOTES
Subjective     Jose J Ivory is a 64 y.o. male who presents with Lung Cancer Screening Program Prescreen and Nicotine Dependence            HPI  Patient seen today for initial lung cancer screening visit. Patient referred by Dr. Tania Corona.     The patient meets eligibility criteria including age, smoking history (20+ pack years), if former smoker, quit in the last 15 years, and absence of signs or symptoms of lung cancer.    - Age - 64  - Smoking history - Patient has smoked for 41 years at an average of 1 ppd = 41 pack year smoking history.  - Current smoking status - former smoker, quit smoking cigarettes in 2017 but still smokes cigars  - No symptoms of lung cancer and no previous history of lung cancer     Allergies   Allergen Reactions    Brilinta [Ticagrelor] Shortness of Breath     Med put patient in ER twice     Current Outpatient Medications on File Prior to Visit   Medication Sig Dispense Refill    colchicine (COLCRYS) 0.6 MG Tab Take 1 Tablet by mouth every day. 90 Tablet 3    Lancets Use one lancet to test blood sugar three times daily before meals. 100 Each 0    Melatonin 10 MG Chew Tab Chew.      Insulin Pen Needle 32 G x 4 mm Use one pen needle in pen device to inject insulin three times daily. 100 Each 0    metoprolol SR (TOPROL XL) 50 MG TABLET SR 24 HR Take 1 Tablet by mouth every evening. 90 Tablet 3    prasugrel (EFFIENT) 10 MG Tab Take 1 Tablet by mouth every day. 90 Tablet 3    lisinopril (PRINIVIL) 10 MG Tab Take 1 Tablet by mouth 2 times a day. 180 Tablet 3    ezetimibe (ZETIA) 10 MG Tab Take 1 Tablet by mouth every day. 90 Tablet 3    aspirin 81 MG EC tablet Take 1 Tablet by mouth every day. 90 Tablet 3    Empagliflozin (JARDIANCE) 25 MG Tab Take 1 tablet by mouth every day. 90 Tablet 3    rosuvastatin (CRESTOR) 40 MG tablet Take 1 Tablet by mouth every day. 90 Tablet 3    Blood Glucose Monitoring Suppl (BLOOD GLUCOSE MONITOR SYSTEM) w/Device Kit Test blood sugar as recommended by  "provider. 1 Kit 0    glucose blood strip Use one strip to test blood sugar three times daily before meals. 100 Strip 0    Alcohol Swabs Wipe site with prep pad prior to injection. 100 Each 0    diphenhydrAMINE-APAP, sleep, (TYLENOL PM EXTRA STRENGTH)  MG Tab Take 2 Tablets by mouth at bedtime.      acetaminophen (TYLENOL) 500 MG Tab Take 500 mg by mouth every evening.      NON SPECIFIED Allergy shots every month with Dr. Rae      Azelastine HCl (ASTEPRO NA) Administer 2 Sprays into each nostril at bedtime.      Calcium Carbonate Antacid (SHAMA-SELTZER ANTACID PO) Take 1 Tablet by mouth at bedtime.       No current facility-administered medications on file prior to visit.       Review of Systems   Constitutional:  Negative for chills, fever and weight loss.   Respiratory:  Negative for cough, hemoptysis, sputum production, shortness of breath and wheezing.    Cardiovascular:  Negative for chest pain and palpitations.              Objective     BP 96/60 (BP Location: Right arm, Patient Position: Sitting, BP Cuff Size: Adult)   Pulse 83   Resp 14   Ht 1.803 m (5' 11\")   Wt 102 kg (224 lb)   SpO2 95%   BMI 31.24 kg/m²      Physical Exam  Constitutional:       Appearance: Normal appearance.   Cardiovascular:      Rate and Rhythm: Normal rate and regular rhythm.   Pulmonary:      Effort: Pulmonary effort is normal.      Breath sounds: Normal breath sounds.   Musculoskeletal:         General: No swelling.   Neurological:      Mental Status: He is alert.                   Assessment & Plan        Assessment & Plan  Personal history of tobacco use, presenting hazards to health    Orders:    CT-LUNG CANCER-SCREENING; Future       We conducted a shared decision-making process using a decision aid. We reviewed benefits and harms of screening, including false positives and potential need for additional diagnostic testing, the possibility of over diagnosis, and total radiation exposure.    We discussed the importance " of adhering to annual LDCT screening. We also discussed the impact of comorbities on the patient's the ability or willingness to undergo diagnostic procedure(s) and treatment.    Counseling on the importance of maintaining cigarette smoking abstinence if former smoker; or the importance of smoking cessation if current smoker and, if appropriate, furnishing of information about tobacco cessation interventions.    Based on our discussion, we have decided to begin annual lung cancer screening starting now.    Jose J is interested in participating in research regarding lung cancer screening. His contact information was given to the research team.     No follow-up needed

## 2024-09-10 ENCOUNTER — OFFICE VISIT (OUTPATIENT)
Dept: SLEEP MEDICINE | Facility: MEDICAL CENTER | Age: 64
End: 2024-09-10
Attending: FAMILY MEDICINE
Payer: COMMERCIAL

## 2024-09-10 VITALS
RESPIRATION RATE: 14 BRPM | HEIGHT: 71 IN | WEIGHT: 224 LBS | SYSTOLIC BLOOD PRESSURE: 96 MMHG | BODY MASS INDEX: 31.36 KG/M2 | HEART RATE: 83 BPM | OXYGEN SATURATION: 95 % | DIASTOLIC BLOOD PRESSURE: 60 MMHG

## 2024-09-10 DIAGNOSIS — Z87.891 PERSONAL HISTORY OF TOBACCO USE, PRESENTING HAZARDS TO HEALTH: ICD-10-CM

## 2024-09-10 PROCEDURE — 3074F SYST BP LT 130 MM HG: CPT | Performed by: FAMILY MEDICINE

## 2024-09-10 PROCEDURE — 3078F DIAST BP <80 MM HG: CPT | Performed by: FAMILY MEDICINE

## 2024-09-10 PROCEDURE — G0296 VISIT TO DETERM LDCT ELIG: HCPCS | Performed by: FAMILY MEDICINE

## 2024-09-10 ASSESSMENT — ENCOUNTER SYMPTOMS
HEMOPTYSIS: 0
PALPITATIONS: 0
SPUTUM PRODUCTION: 0
WEIGHT LOSS: 0
COUGH: 0
CHILLS: 0
FEVER: 0
SHORTNESS OF BREATH: 0
WHEEZING: 0

## 2024-09-10 ASSESSMENT — FIBROSIS 4 INDEX: FIB4 SCORE: 1.15

## 2024-09-10 NOTE — Clinical Note
Thank you for referring Jose J to the Lung Cancer Screening program.  I enrolled him today. I will update you re: abnormal findings. -Dr. Apurva Leon

## 2024-09-18 ENCOUNTER — TELEPHONE (OUTPATIENT)
Facility: MEDICAL CENTER | Age: 64
End: 2024-09-18
Payer: COMMERCIAL

## 2024-09-18 NOTE — TELEPHONE ENCOUNTER
Call x2 9-18-24 @ 4:25pm    No answer/ LVM with Direct line for patient to call back if interested

## 2024-11-05 ENCOUNTER — OFFICE VISIT (OUTPATIENT)
Dept: CARDIOLOGY | Facility: MEDICAL CENTER | Age: 64
End: 2024-11-05
Attending: INTERNAL MEDICINE
Payer: COMMERCIAL

## 2024-11-05 VITALS
HEART RATE: 76 BPM | SYSTOLIC BLOOD PRESSURE: 118 MMHG | HEIGHT: 71 IN | OXYGEN SATURATION: 95 % | RESPIRATION RATE: 16 BRPM | WEIGHT: 228 LBS | BODY MASS INDEX: 31.92 KG/M2 | DIASTOLIC BLOOD PRESSURE: 68 MMHG

## 2024-11-05 DIAGNOSIS — E11.8 TYPE II DIABETES MELLITUS WITH COMPLICATION (HCC): ICD-10-CM

## 2024-11-05 DIAGNOSIS — I10 ESSENTIAL HYPERTENSION: ICD-10-CM

## 2024-11-05 DIAGNOSIS — E78.2 MIXED HYPERLIPIDEMIA: ICD-10-CM

## 2024-11-05 DIAGNOSIS — I25.10 CORONARY ARTERY DISEASE INVOLVING NATIVE CORONARY ARTERY OF NATIVE HEART WITHOUT ANGINA PECTORIS: ICD-10-CM

## 2024-11-05 DIAGNOSIS — Z95.5 STATUS POST PLACEMENT OF STENT IN RIGHT CORONARY ARTERY: ICD-10-CM

## 2024-11-05 PROCEDURE — 3078F DIAST BP <80 MM HG: CPT | Performed by: INTERNAL MEDICINE

## 2024-11-05 PROCEDURE — 99213 OFFICE O/P EST LOW 20 MIN: CPT | Performed by: INTERNAL MEDICINE

## 2024-11-05 PROCEDURE — 3074F SYST BP LT 130 MM HG: CPT | Performed by: INTERNAL MEDICINE

## 2024-11-05 ASSESSMENT — FIBROSIS 4 INDEX: FIB4 SCORE: 1.15

## 2024-11-05 NOTE — PROGRESS NOTES
Subjective:   Yves Ivory is a 64 y .o. male who presents today for follow-up of CAD hypertension and hyperlipidemia.    Hx of anxiety, HLD, obesity, HTN, anxiety, depression, smoking hx, and now CAD s/p STEMI with DENISSE to OM, VF arrest.  Subsequently inferior AMI status post DENISSE x 2 to the RCA with preserved LV function and recovered symptoms.      In the interim has no symptoms exerts himself regularly takes his medications as directed and has been blood pressure and heart rate.    Past Medical History:   Diagnosis Date    Abdominal gas pain 12/12/2016    Anxiety     Biliary colic 01/10/2020    Bronchitis     CAD (coronary artery disease)     STEMI-S/P DENISSE to OM    Chickenpox     Coronary heart disease     Cough 12/12/2016    Depression     Former smoker 04/25/2019    [place] measles     History of nocturia 04/14/2022    Reports was started on Flomax last year by prior PCP for increased night time urinary urgency. His lisinopril was also increased for better BP management; states he stopped taking flomax because he noticed it dropped his BP too low. He has noticed some improvement in nocturia since increasing lisinopril.   He will let me know if this becomes a problem for him again and we will revisit treatment.    Hyperlipidemia     Hypertension     Influenza     Nasal congestion 10/12/2022    Reports having nasal congestion at night related to allergies for years. He also has hx of ROSALINO but unable to tolerate BIPAP mask. He has been using Afrin nasal spray nightly for some years now. He is concerned about long term use of nasal spray and would like to see allergist.     Obesity     Preventative health care 12/12/2016    Sleep apnea     Status post placement of stent in right coronary artery - 3/2024 NSTEMI 03/05/2024    Ventricular tachycardia (HCC)     post STEMI     VT (ventricular tachycardia) (HCC) 07/26/2016    Whooping cough      Past Surgical History:   Procedure Laterality Date    ANGIOPLASTY       APPENDECTOMY CHILD      TONSILLECTOMY      ZZZ CARDIAC CATH       Family History   Problem Relation Age of Onset    Cancer Mother         breast cancer    Heart Disease Father 38        heart transplant, 3 MI's, multiple bypass    Stroke Maternal Grandmother     Colorectal Cancer Neg Hx      Social History     Tobacco Use   Smoking Status Former    Current packs/day: 0.00    Average packs/day: 1 pack/day for 41.0 years (41.0 ttl pk-yrs)    Types: Cigars, Cigarettes    Start date:     Quit date:     Years since quittin.8   Smokeless Tobacco Former    Types: Chew    Quit date:    Tobacco Comments    1 - 2 cigars on the weeknds      Allergies   Allergen Reactions    Brilinta [Ticagrelor] Shortness of Breath     Med put patient in ER twice     Outpatient Encounter Medications as of 2024   Medication Sig Dispense Refill    colchicine (COLCRYS) 0.6 MG Tab Take 1 Tablet by mouth every day. 90 Tablet 3    Melatonin 10 MG Chew Tab Chew.      metoprolol SR (TOPROL XL) 50 MG TABLET SR 24 HR Take 1 Tablet by mouth every evening. 90 Tablet 3    prasugrel (EFFIENT) 10 MG Tab Take 1 Tablet by mouth every day. 90 Tablet 3    lisinopril (PRINIVIL) 10 MG Tab Take 1 Tablet by mouth 2 times a day. 180 Tablet 3    ezetimibe (ZETIA) 10 MG Tab Take 1 Tablet by mouth every day. 90 Tablet 3    aspirin 81 MG EC tablet Take 1 Tablet by mouth every day. 90 Tablet 3    Empagliflozin (JARDIANCE) 25 MG Tab Take 1 tablet by mouth every day. 90 Tablet 3    rosuvastatin (CRESTOR) 40 MG tablet Take 1 Tablet by mouth every day. 90 Tablet 3    diphenhydrAMINE-APAP, sleep, (TYLENOL PM EXTRA STRENGTH)  MG Tab Take 2 Tablets by mouth at bedtime.      acetaminophen (TYLENOL) 500 MG Tab Take 500 mg by mouth every evening.      NON SPECIFIED Allergy shots every month with Dr. Rae      Azelastine HCl (ASTEPRO NA) Administer 2 Sprays into each nostril at bedtime.      Calcium Carbonate Antacid (SHAMA-SELTZER ANTACID PO) Take 1  "Tablet by mouth at bedtime.      Lancets Use one lancet to test blood sugar three times daily before meals. 100 Each 0    Insulin Pen Needle 32 G x 4 mm Use one pen needle in pen device to inject insulin three times daily. 100 Each 0    Blood Glucose Monitoring Suppl (BLOOD GLUCOSE MONITOR SYSTEM) w/Device Kit Test blood sugar as recommended by provider. 1 Kit 0    glucose blood strip Use one strip to test blood sugar three times daily before meals. 100 Strip 0    Alcohol Swabs Wipe site with prep pad prior to injection. 100 Each 0     No facility-administered encounter medications on file as of 11/5/2024.     Review of Systems   All other systems reviewed and are negative.       Objective:   /68 (BP Location: Left arm, Patient Position: Sitting)   Pulse 76   Resp 16   Ht 1.803 m (5' 11\")   Wt 103 kg (228 lb)   SpO2 95%   BMI 31.80 kg/m²     Physical Exam  Vitals reviewed.   Constitutional:       General: He is not in acute distress.     Appearance: He is well-developed. He is not diaphoretic.   HENT:      Head: Normocephalic and atraumatic.      Right Ear: External ear normal.      Left Ear: External ear normal.   Eyes:      General: No scleral icterus.        Right eye: No discharge.         Left eye: No discharge.      Conjunctiva/sclera: Conjunctivae normal.      Pupils: Pupils are equal, round, and reactive to light.   Neck:      Thyroid: No thyromegaly.      Vascular: No JVD.      Trachea: No tracheal deviation.   Cardiovascular:      Rate and Rhythm: Normal rate and regular rhythm.      Chest Wall: PMI is not displaced.      Pulses:           Carotid pulses are 2+ on the right side and 2+ on the left side.       Radial pulses are 2+ on the left side.        Popliteal pulses are 2+ on the right side and 2+ on the left side.        Dorsalis pedis pulses are 2+ on the right side and 2+ on the left side.        Posterior tibial pulses are 2+ on the right side and 2+ on the left side.      Heart " sounds: No murmur heard.     No friction rub. No gallop.   Pulmonary:      Effort: Pulmonary effort is normal. No respiratory distress.      Breath sounds: Normal breath sounds. No wheezing or rales.   Chest:      Chest wall: No tenderness.   Abdominal:      General: Bowel sounds are normal. There is no distension.      Palpations: Abdomen is soft.      Tenderness: There is no abdominal tenderness.   Musculoskeletal:         General: No tenderness or deformity. Normal range of motion.      Cervical back: Normal range of motion and neck supple.   Skin:     General: Skin is warm and dry.      Coloration: Skin is not pale.      Findings: No erythema or rash.   Neurological:      Mental Status: He is alert and oriented to person, place, and time.      Cranial Nerves: No cranial nerve deficit (cranial nerves II through XII grossly intact).      Coordination: Coordination normal.   Psychiatric:         Behavior: Behavior normal.         Thought Content: Thought content normal.       7/7/16 ANGIOGRAM POSTPROCEDURE DIAGNOSES:  1.  Coronary artery disease including occluded proximal obtuse marginal    branch, additional nonobstructive mid left anterior descending artery    stenosis.  2.  Successful thrombectomy/PTCA/stent placement of the proximal to mid obtuse   marginal branch with 3.0x32-mm Promus PREMIER drug-eluting stent.  3.  Angio-Seal closure.    7/8/16 ECHO CONCLUSIONS  Left ventricular ejection fraction is visually estimated to be 55%,   inferior hypokinesis.  Grade II-III diastolic dysfunction.  Mild concentric left ventricular   hypertrophy.  Dilated inferior vena cava with inspiratory collapse.  No significant valve disease or flow abnormalities.   No prior study is available for comparison.     Lab Results   Component Value Date/Time    WBC 9.6 03/06/2024 03:43 AM    RBC 4.82 03/06/2024 03:43 AM    HEMOGLOBIN 14.5 03/06/2024 03:43 AM    HEMATOCRIT 41.3 (L) 03/06/2024 03:43 AM    MCV 85.7 03/06/2024 03:43 AM     MCH 30.1 03/06/2024 03:43 AM    MCHC 35.1 03/06/2024 03:43 AM    MPV 9.9 03/06/2024 03:43 AM      Lab Results   Component Value Date/Time    CHOLSTRLTOT 109 06/10/2024 06:40 AM    LDL 51 06/10/2024 06:40 AM    HDL 35 (A) 06/10/2024 06:40 AM    TRIGLYCERIDE 114 06/10/2024 06:40 AM       Lab Results   Component Value Date/Time    SODIUM 139 06/10/2024 06:40 AM    POTASSIUM 5.0 06/10/2024 06:40 AM    CHLORIDE 104 06/10/2024 06:40 AM    CO2 24 06/10/2024 06:40 AM    GLUCOSE 140 (H) 06/10/2024 06:40 AM    BUN 15 06/10/2024 06:40 AM    CREATININE 0.75 06/10/2024 06:40 AM     Lab Results   Component Value Date/Time    ALKPHOSPHAT 66 06/10/2024 06:40 AM    ASTSGOT 27 06/10/2024 06:40 AM    ALTSGPT 47 06/10/2024 06:40 AM    TBILIRUBIN 0.5 06/10/2024 06:40 AM      Lab Results   Component Value Date/Time    BNPBTYPENAT 6 10/06/2016 02:20 PM        Imaging reviewed      Assessment:     1. Coronary artery disease involving native coronary artery of native heart without angina pectoris        2. Essential hypertension        3. Mixed hyperlipidemia        4. Type II diabetes mellitus with complication (HCC)        5. Status post placement of stent in right coronary artery            Medical Decision Making:  Today's Assessment / Status / Plan:     Doing well after AMI with preserved EF on aggressive preventative therapy which includes colchicine.  We rediscussed the data as it currently stands for this.  Goal LDL is less than 70 closer to 50 which she is achieving.  We discussed diet and lifestyle choices.  Continue current medical therapy and follow-up routinely.    () Today's E/M visit is associated with medical care services that serve as the continuing focal point for all needed health care services and/or with medical care services that  are part of ongoing care related to a patient's single, serious condition, or a complex condition: This includes  furnishing services to patients on an ongoing basis that result in care  that is personalized  to the patient. The services result in a comprehensive, longitudinal, and continuous  relationship with the patient and involve delivery of team-based care that is accessible, coordinated with other practitioners and providers, and integrated with the broader health  care landscape.

## 2024-11-27 ENCOUNTER — TELEPHONE (OUTPATIENT)
Dept: HEALTH INFORMATION MANAGEMENT | Facility: OTHER | Age: 64
End: 2024-11-27
Payer: COMMERCIAL

## 2024-12-21 DIAGNOSIS — I25.10 CORONARY ARTERY DISEASE INVOLVING NATIVE CORONARY ARTERY OF NATIVE HEART WITHOUT ANGINA PECTORIS: ICD-10-CM

## 2024-12-23 RX ORDER — METOPROLOL SUCCINATE 50 MG/1
50 TABLET, EXTENDED RELEASE ORAL EVERY EVENING
Qty: 90 TABLET | Refills: 3 | Status: SHIPPED | OUTPATIENT
Start: 2024-12-23

## 2024-12-24 DIAGNOSIS — I25.10 CORONARY ARTERY DISEASE INVOLVING NATIVE CORONARY ARTERY OF NATIVE HEART WITHOUT ANGINA PECTORIS: ICD-10-CM

## 2024-12-24 DIAGNOSIS — I10 ESSENTIAL HYPERTENSION: ICD-10-CM

## 2024-12-24 RX ORDER — DILTIAZEM HYDROCHLORIDE 120 MG/1
120 CAPSULE, EXTENDED RELEASE ORAL
Qty: 90 CAPSULE | Refills: 3 | Status: SHIPPED | OUTPATIENT
Start: 2024-12-24

## 2024-12-24 NOTE — TELEPHONE ENCOUNTER
Is the patient due for a refill? Yes    Was the patient seen the past year? Yes    Date of last office visit: 11.05.2024    Does the patient have an upcoming appointment?  Yes   If yes, When? 05.06.2025    Provider to refill:TW    Does the patient have [facility] and need 100-day supply? (This applies to ALL medications) Patient does not have SCP

## 2025-03-18 DIAGNOSIS — I25.10 CORONARY ARTERY DISEASE INVOLVING NATIVE CORONARY ARTERY OF NATIVE HEART WITHOUT ANGINA PECTORIS: ICD-10-CM

## 2025-03-18 DIAGNOSIS — E11.8 TYPE II DIABETES MELLITUS WITH COMPLICATION (HCC): ICD-10-CM

## 2025-03-18 RX ORDER — EMPAGLIFLOZIN 25 MG/1
25 TABLET, FILM COATED ORAL DAILY
Qty: 90 TABLET | Refills: 3 | Status: SHIPPED | OUTPATIENT
Start: 2025-03-18

## 2025-03-18 RX ORDER — PRASUGREL 10 MG/1
10 TABLET, FILM COATED ORAL DAILY
Qty: 90 TABLET | Refills: 3 | Status: SHIPPED | OUTPATIENT
Start: 2025-03-18

## 2025-03-18 NOTE — TELEPHONE ENCOUNTER
Is the patient due for a refill? Yes    Was the patient seen the last 15 months? Yes    Date of last office visit: 11.5.2024    Does the patient have an upcoming appointment?  Yes   If yes, When? 5.6.2025    Provider to refill:TW    Does the patient have [facility] and need 100-day supply? (This applies to ALL medications) Patient does not have SCP

## 2025-03-20 DIAGNOSIS — I25.10 CORONARY ARTERY DISEASE INVOLVING NATIVE CORONARY ARTERY OF NATIVE HEART WITHOUT ANGINA PECTORIS: ICD-10-CM

## 2025-03-20 RX ORDER — ROSUVASTATIN CALCIUM 40 MG/1
40 TABLET, COATED ORAL DAILY
Qty: 90 TABLET | Refills: 3 | Status: SHIPPED | OUTPATIENT
Start: 2025-03-20

## 2025-03-20 NOTE — TELEPHONE ENCOUNTER
Is the patient due for a refill? Yes    Was the patient seen the last 15 months? Yes    Date of last office visit: 11/5/2024    Does the patient have an upcoming appointment?  Yes   If yes, When? 5/6/2025    Provider to refill:TW    Does the patient have [facility] and need 100-day supply? (This applies to ALL medications) Patient does not have SCP

## 2025-03-22 DIAGNOSIS — E78.2 MIXED HYPERLIPIDEMIA: ICD-10-CM

## 2025-03-24 RX ORDER — EZETIMIBE 10 MG/1
10 TABLET ORAL DAILY
Qty: 90 TABLET | Refills: 3 | Status: SHIPPED | OUTPATIENT
Start: 2025-03-24

## 2025-03-24 NOTE — TELEPHONE ENCOUNTER
Is the patient due for a refill? Yes    Was the patient seen the last 15 months? Yes    Date of last office visit: 11.05.2024    Does the patient have an upcoming appointment?  Yes   If yes, When? 05.06.2025    Provider to refill:TW    Does the patient have [facility] and need 100-day supply? (This applies to ALL medications) Patient does not have SCP

## 2025-03-28 ENCOUNTER — TELEPHONE (OUTPATIENT)
Dept: CARDIOLOGY | Facility: MEDICAL CENTER | Age: 65
End: 2025-03-28
Payer: COMMERCIAL

## 2025-03-28 NOTE — TELEPHONE ENCOUNTER
Lena Gusman,    I just called this patient to discuss his Jardiance copay, and he informed me that [facility] has been filling Diltiazem for him and he is no longer taking that medication.     Per his chart, there is documentation from 04/2024 that he has discontinued the Diltiazem.     Can you please confirm the discontinuation with MD and cancel Rx at [facility] if he is not to take it anymore?    Thank you!    Molly RIBERA  Rx Coordinator

## 2025-03-29 DIAGNOSIS — I10 ESSENTIAL HYPERTENSION: ICD-10-CM

## 2025-03-31 NOTE — TELEPHONE ENCOUNTER
Is the patient due for a refill? Yes    Was the patient seen the last 15 months? Yes    Date of last office visit: 11.05.2024    Does the patient have an upcoming appointment?  Yes   If yes, When? 05.06.2025    Provider to refill: TW    Does the patient have [facility] Plus and need 100-day supply? (This applies to ALL medications) Patient does not have SCP

## 2025-04-02 DIAGNOSIS — I10 ESSENTIAL HYPERTENSION: ICD-10-CM

## 2025-04-03 DIAGNOSIS — I10 ESSENTIAL HYPERTENSION: ICD-10-CM

## 2025-04-03 RX ORDER — LISINOPRIL 10 MG/1
10 TABLET ORAL 2 TIMES DAILY
Qty: 180 TABLET | Refills: 0 | Status: SHIPPED | OUTPATIENT
Start: 2025-04-03

## 2025-04-03 RX ORDER — LISINOPRIL 10 MG/1
10 TABLET ORAL 2 TIMES DAILY
Qty: 180 TABLET | Refills: 2 | Status: CANCELLED | OUTPATIENT
Start: 2025-04-03

## 2025-04-03 NOTE — TELEPHONE ENCOUNTER
Is the patient due for a refill? Yes    Was the patient seen the last 15 months? Yes    Date of last office visit: 11/5/2024    Does the patient have an upcoming appointment?  Yes   If yes, When? 5/6/2025    Provider to refill:TW    Does the patient have [facility] and need 100-day supply? (This applies to ALL medications) Patient does not have SCP

## 2025-04-04 RX ORDER — LISINOPRIL 10 MG/1
10 TABLET ORAL 2 TIMES DAILY
Qty: 180 TABLET | Refills: 2 | Status: SHIPPED | OUTPATIENT
Start: 2025-04-04 | End: 2025-04-09

## 2025-04-08 SDOH — HEALTH STABILITY: PHYSICAL HEALTH: ON AVERAGE, HOW MANY DAYS PER WEEK DO YOU ENGAGE IN MODERATE TO STRENUOUS EXERCISE (LIKE A BRISK WALK)?: 0 DAYS

## 2025-04-08 SDOH — HEALTH STABILITY: PHYSICAL HEALTH: ON AVERAGE, HOW MANY MINUTES DO YOU ENGAGE IN EXERCISE AT THIS LEVEL?: 0 MIN

## 2025-04-08 SDOH — ECONOMIC STABILITY: FOOD INSECURITY: WITHIN THE PAST 12 MONTHS, THE FOOD YOU BOUGHT JUST DIDN'T LAST AND YOU DIDN'T HAVE MONEY TO GET MORE.: NEVER TRUE

## 2025-04-08 SDOH — ECONOMIC STABILITY: INCOME INSECURITY: HOW HARD IS IT FOR YOU TO PAY FOR THE VERY BASICS LIKE FOOD, HOUSING, MEDICAL CARE, AND HEATING?: NOT HARD AT ALL

## 2025-04-08 SDOH — ECONOMIC STABILITY: INCOME INSECURITY: IN THE LAST 12 MONTHS, WAS THERE A TIME WHEN YOU WERE NOT ABLE TO PAY THE MORTGAGE OR RENT ON TIME?: NO

## 2025-04-08 SDOH — ECONOMIC STABILITY: FOOD INSECURITY: WITHIN THE PAST 12 MONTHS, YOU WORRIED THAT YOUR FOOD WOULD RUN OUT BEFORE YOU GOT MONEY TO BUY MORE.: NEVER TRUE

## 2025-04-08 ASSESSMENT — SOCIAL DETERMINANTS OF HEALTH (SDOH)
DO YOU BELONG TO ANY CLUBS OR ORGANIZATIONS SUCH AS CHURCH GROUPS UNIONS, FRATERNAL OR ATHLETIC GROUPS, OR SCHOOL GROUPS?: NO
HOW OFTEN DO YOU ATTEND CHURCH OR RELIGIOUS SERVICES?: NEVER
HOW OFTEN DO YOU ATTENT MEETINGS OF THE CLUB OR ORGANIZATION YOU BELONG TO?: NEVER
HOW HARD IS IT FOR YOU TO PAY FOR THE VERY BASICS LIKE FOOD, HOUSING, MEDICAL CARE, AND HEATING?: NOT HARD AT ALL
HOW MANY DRINKS CONTAINING ALCOHOL DO YOU HAVE ON A TYPICAL DAY WHEN YOU ARE DRINKING: 5 OR 6
HOW OFTEN DO YOU ATTENT MEETINGS OF THE CLUB OR ORGANIZATION YOU BELONG TO?: NEVER
WITHIN THE PAST 12 MONTHS, YOU WORRIED THAT YOUR FOOD WOULD RUN OUT BEFORE YOU GOT THE MONEY TO BUY MORE: NEVER TRUE
HOW OFTEN DO YOU ATTEND CHURCH OR RELIGIOUS SERVICES?: NEVER
IN A TYPICAL WEEK, HOW MANY TIMES DO YOU TALK ON THE PHONE WITH FAMILY, FRIENDS, OR NEIGHBORS?: ONCE A WEEK
HOW OFTEN DO YOU HAVE A DRINK CONTAINING ALCOHOL: 2-4 TIMES A MONTH
IN A TYPICAL WEEK, HOW MANY TIMES DO YOU TALK ON THE PHONE WITH FAMILY, FRIENDS, OR NEIGHBORS?: ONCE A WEEK
IN THE PAST 12 MONTHS, HAS THE ELECTRIC, GAS, OIL, OR WATER COMPANY THREATENED TO SHUT OFF SERVICE IN YOUR HOME?: NO
DO YOU BELONG TO ANY CLUBS OR ORGANIZATIONS SUCH AS CHURCH GROUPS UNIONS, FRATERNAL OR ATHLETIC GROUPS, OR SCHOOL GROUPS?: NO
HOW OFTEN DO YOU GET TOGETHER WITH FRIENDS OR RELATIVES?: ONCE A WEEK
HOW OFTEN DO YOU GET TOGETHER WITH FRIENDS OR RELATIVES?: ONCE A WEEK
HOW OFTEN DO YOU HAVE SIX OR MORE DRINKS ON ONE OCCASION: WEEKLY

## 2025-04-08 ASSESSMENT — LIFESTYLE VARIABLES
HOW MANY STANDARD DRINKS CONTAINING ALCOHOL DO YOU HAVE ON A TYPICAL DAY: 5 OR 6
SKIP TO QUESTIONS 9-10: 0
AUDIT-C TOTAL SCORE: 7
HOW OFTEN DO YOU HAVE A DRINK CONTAINING ALCOHOL: 2-4 TIMES A MONTH
HOW OFTEN DO YOU HAVE SIX OR MORE DRINKS ON ONE OCCASION: WEEKLY

## 2025-04-09 ENCOUNTER — OFFICE VISIT (OUTPATIENT)
Dept: MEDICAL GROUP | Facility: PHYSICIAN GROUP | Age: 65
End: 2025-04-09
Payer: COMMERCIAL

## 2025-04-09 VITALS
BODY MASS INDEX: 32.62 KG/M2 | DIASTOLIC BLOOD PRESSURE: 70 MMHG | TEMPERATURE: 97.4 F | SYSTOLIC BLOOD PRESSURE: 118 MMHG | HEIGHT: 71 IN | HEART RATE: 72 BPM | OXYGEN SATURATION: 96 % | WEIGHT: 233 LBS

## 2025-04-09 DIAGNOSIS — I25.10 CORONARY ARTERY DISEASE INVOLVING NATIVE CORONARY ARTERY OF NATIVE HEART WITHOUT ANGINA PECTORIS: ICD-10-CM

## 2025-04-09 DIAGNOSIS — G47.33 OBSTRUCTIVE SLEEP APNEA: ICD-10-CM

## 2025-04-09 DIAGNOSIS — M54.41 ACUTE RIGHT-SIDED LOW BACK PAIN WITH RIGHT-SIDED SCIATICA: ICD-10-CM

## 2025-04-09 DIAGNOSIS — I10 ESSENTIAL HYPERTENSION: ICD-10-CM

## 2025-04-09 DIAGNOSIS — E78.2 MIXED HYPERLIPIDEMIA: ICD-10-CM

## 2025-04-09 DIAGNOSIS — I25.2 HISTORY OF ST ELEVATION MYOCARDIAL INFARCTION (STEMI): ICD-10-CM

## 2025-04-09 DIAGNOSIS — E11.8 TYPE II DIABETES MELLITUS WITH COMPLICATION (HCC): ICD-10-CM

## 2025-04-09 PROBLEM — I21.4 NSTEMI (NON-ST ELEVATED MYOCARDIAL INFARCTION) (HCC): Status: RESOLVED | Noted: 2024-03-05 | Resolved: 2025-04-09

## 2025-04-09 PROCEDURE — 3074F SYST BP LT 130 MM HG: CPT | Performed by: STUDENT IN AN ORGANIZED HEALTH CARE EDUCATION/TRAINING PROGRAM

## 2025-04-09 PROCEDURE — 3078F DIAST BP <80 MM HG: CPT | Performed by: STUDENT IN AN ORGANIZED HEALTH CARE EDUCATION/TRAINING PROGRAM

## 2025-04-09 PROCEDURE — 99214 OFFICE O/P EST MOD 30 MIN: CPT | Performed by: STUDENT IN AN ORGANIZED HEALTH CARE EDUCATION/TRAINING PROGRAM

## 2025-04-09 PROCEDURE — 92250 FUNDUS PHOTOGRAPHY W/I&R: CPT | Mod: TC | Performed by: STUDENT IN AN ORGANIZED HEALTH CARE EDUCATION/TRAINING PROGRAM

## 2025-04-09 RX ORDER — CYCLOBENZAPRINE HCL 5 MG
5-10 TABLET ORAL 3 TIMES DAILY PRN
Qty: 60 TABLET | Refills: 0 | Status: SHIPPED | OUTPATIENT
Start: 2025-04-09

## 2025-04-09 ASSESSMENT — PATIENT HEALTH QUESTIONNAIRE - PHQ9: CLINICAL INTERPRETATION OF PHQ2 SCORE: 0

## 2025-04-09 ASSESSMENT — FIBROSIS 4 INDEX: FIB4 SCORE: 1.15

## 2025-04-09 ASSESSMENT — ENCOUNTER SYMPTOMS
FEVER: 0
PALPITATIONS: 0
CHILLS: 0
SHORTNESS OF BREATH: 0

## 2025-04-09 NOTE — PROGRESS NOTES
Subjective:   Verbal consent was acquired by the patient to use [employer] ambient listening note generation during this visit Yes     CC: Follow-up on chronic conditions    History of Present Illness  Mr. Ivory is a pleasant 65 yo who presents for evaluation of diabetes, back pain, and sleep apnea.    He reports a recent increase in his blood glucose levels, with readings ranging from 150 to 160. He manages this by skipping breakfast and lunch, opting for coffee with sweet cream in the morning instead. He is not on insulin therapy.    He experiences intermittent back pain, which he attributes to sciatica, characterized by shooting pain down his right leg that does not extend beyond the knee. The pain is localized to the buttock and hip region and typically resolves spontaneously. However, he has been experiencing persistent soreness for the past 10 days, which he believes is due to overexertion at work or home. His sleep has been disrupted due to the pain, leading to increased awakenings at night. He maintains an active lifestyle, including camping during warmer weather and using a treadmill and bicycle during the winter months. He has been managing the pain with nightly doses of Tylenol and occasional use of Advil for inflammation.    He also reports a diagnosis of sleep apnea and has been prescribed BiPAP therapy, which he used for several months but found it ineffective. He is seeking alternative treatment options for his sleep apnea. He has a history of snoring, daytime fatigue, and witnessed apneic episodes. He also reports a recent episode of tachycardia upon awakening. He has a history of high blood pressure. He has a history of nasal obstruction while using the CPAP mask, which led to Afrin dependency. He now receives allergy maintenance injections and uses Astepro nasal spray, along with nightly Breathe Right strips, which have improved his sleep quality. He has undergone a sleep study and had his  "BiPAP settings adjusted.    Supplemental Information  He has an upcoming appointment with his cardiologist in May 2025 and is requesting blood work to be done prior to this visit. He has a dental appointment next week due to teeth grinding and shifting, which he believes may be related to his sleep apnea. He has two areas where his teeth are now in contact, causing food impaction. One area involves a crown, while the other is a filling that is beginning to deteriorate. His dentist plans to repair the filling and attempt to close the gap slightly.    SOCIAL HISTORY  He quit smoking.    MEDICATIONS  Current: Tylenol, aspirin 81 mg, Astepro nasal spray, Olimpia-Henderson, colchicine, Zetia 10 mg, Jardiance 25 mg, lisinopril 20 mg daily, melatonin 10 mg, metoprolol 50 mg, prasugrel 10 mg daily, rosuvastatin 40 mg    Patient Active Problem List    Diagnosis Date Noted    Status post placement of stent in right coronary artery - 3/2024 NSTEMI 03/05/2024    Toenail deformity 04/14/2022    Vasculogenic erectile dysfunction 07/14/2020    Essential hypertension 07/14/2020    ROSALINO (obstructive sleep apnea) 04/25/2019    Type II diabetes mellitus with complication (HCC) 04/22/2018    Obesity (BMI 30-39.9) 12/12/2016    Cigar smoker 08/22/2016    Coronary artery disease involving native coronary artery of native heart without angina pectoris 07/26/2016    HLD (hyperlipidemia) 07/26/2016    History of ST elevation myocardial infarction (STEMI) 07/07/2016       Health Maintenance: Completed    ROS:  Review of Systems   Constitutional:  Negative for chills and fever.   Respiratory:  Negative for shortness of breath.    Cardiovascular:  Negative for chest pain and palpitations.       Objective:     Exam:  /70 (BP Location: Right arm, Patient Position: Sitting, BP Cuff Size: Adult)   Pulse 72   Temp 36.3 °C (97.4 °F) (Temporal)   Ht 1.803 m (5' 11\")   Wt 106 kg (233 lb)   SpO2 96%   BMI 32.50 kg/m²  Body mass index is 32.5 " kg/m².    Physical Exam  Constitutional:       Appearance: Normal appearance.   Eyes:      Extraocular Movements: Extraocular movements intact.   Cardiovascular:      Pulses:           Dorsalis pedis pulses are 2+ on the right side and 2+ on the left side.   Musculoskeletal:      Right foot: Normal range of motion. No deformity or bunion.      Left foot: Normal range of motion. No deformity or bunion.   Feet:      Right foot:      Protective Sensation: 5 sites tested.  5 sites sensed.      Skin integrity: Skin integrity normal.      Toenail Condition: Right toenails are normal.      Left foot:      Protective Sensation: 5 sites tested.  5 sites sensed.      Skin integrity: Skin integrity normal.      Toenail Condition: Left toenails are normal.   Neurological:      Mental Status: He is alert.   Psychiatric:         Mood and Affect: Mood normal.         STOPBANG - Sleep Apnea Screening      Flowsheet Row Most Recent Value   S - Have you been told that you SNORE? Yes   T - Are you often TIRED during the day? Yes   O - Do you know if you stop breathing or has anyone witnessed you stop breathing while you were asleep? (OBSTRUCTION) Yes   P - Do you have high blood PRESSURE or on medication to control high blood pressure? Yes   B - Is your Body Mass Index greater than 35? (BMI) No   A - Are you 50 years old or older? (AGE) Yes   N - Are you a male with a NECK circumference greater than 17 inches, or a female with a neck circumference greater than 16 inches? No   G - Are you male? (GENDER) Yes   STOPBANG Total Score 6   ROSALINO Risk High Risk                  Assessment & Plan:     64 y.o. male with the following -     1. Type II diabetes mellitus with complication (HCC)  Chronic, controlled.  A1c from 6/2024 was 6.4%.  Patient previously was provided with A1c order and urine microalbumin which patient has not completed.  Monofilament exam and retinal screening were completed today.  Patient is currently on Jardiance 25 mg  daily.  New orders for A1c and urine microalbumin were provided.  - Diabetic Monofilament LE Exam  - POCT Retinal Eye Exam  - HEMOGLOBIN A1C; Future  - MICROALBUMIN CREAT RATIO URINE; Future    2. Mixed hyperlipidemia  Chronic, controlled.  Lipid panel from 6/2024 was normal.  Patient to continue Zetia 10 mg and rosuvastatin 40mg daily.  - Lipid Profile; Future    3. Essential hypertension  Chronic, controlled.  Blood pressure at goal today.  Continue lisinopril 20 mg daily and metoprolol SR 50 mg daily.  - CBC WITH DIFFERENTIAL; Future  - Comp Metabolic Panel; Future    4. History of ST elevation myocardial infarction (STEMI)  5. Coronary artery disease involving native coronary artery of native heart without angina pectoris  Patient is established with cardiology and has an upcoming appointment in 5/2025.  Patient to continue lisinopril 20 mg daily, Zetia 10 mg daily, rosuvastatin 40 mg daily, prasugrel 10 mg daily, and metoprolol 50 mg daily.    6. Acute right-sided low back pain with right-sided sciatica  Acute, ongoing.  Patient reports having acute right-sided low back pain with radiation down the thigh for the past 10 days.  Will prescribe patient Flexeril to be taken at bedtime.  Side effects of drowsiness were discussed.  Handout with back stretches was provided to the patient.  - cyclobenzaprine (FLEXERIL) 5 mg tablet; Take 1-2 Tablets by mouth 3 times a day as needed for Moderate Pain.  Dispense: 60 Tablet; Refill: 0    7. Obstructive sleep apnea  Chronic, ongoing.  Patient reports he previously was using a BiPAP machine however he is unable to tolerate the mask.  He has questions regarding inspire.  STOP-BANG score of 6.  Will refer patient to sleep medicine to discuss additional options besides CPAP/BiPAP.  - Referral to Pulmonary and Sleep Medicine        Return in about 3 months (around 7/9/2025), or if symptoms worsen or fail to improve, for Annual.    Please note that this dictation was created  using voice recognition software. I have made every reasonable attempt to correct obvious errors, but I expect that there are errors of grammar and possibly content that I did not discover before finalizing the note.

## 2025-04-14 LAB — RETINAL SCREEN: NEGATIVE

## 2025-04-14 NOTE — Clinical Note
REFERRAL APPROVAL NOTICE         Sent on April 14, 2025                   [place]  [address] [place] [address]                   Dear Mr. Ivory,    After a careful review of the medical information and benefit coverage, [facility] has processed your referral. See below for additional details.    If applicable, you must be actively enrolled with your insurance for coverage of the authorized service. If you have any questions regarding your coverage, please contact your insurance directly.    REFERRAL INFORMATION   Referral #:  [id number]  Referred-To Department    Referred-By Provider:  Pulmonary and Sleep Medicine    Tania Corona M.D.   [place] [facility]      [address]  [place] [address]  [phone] [address], [address]  [place] [id number] [phone]    Referral Start Date:  04/09/2025  Referral End Date:   04/09/2026           SCHEDULING  If you do not already have an appointment, please call [phone] to make an appointment.   MORE INFORMATION  As a reminder, [facility] ownership has changed, meaning this location is now owned and operated by [facility]. As such, we want to clarify that our patients should expect to receive two separate bills for the services received at [facility] - one representing the [facility] facility fees as the owner of the establishment, and the other to represent the physician's services and subsequent fees. You can speak with your insurance carrier for a pricing estimate by calling the customer service number on the back of your card and ask about charges for a hospital outpatient visit.  If you do not already have a [employer] account, sign up at: [email or web address]  You can access your medical information, make appointments, see lab results, billing information,  and more.  If you have questions regarding this referral, please contact  the [facility] department at:             [phone]. Monday - Friday 7:30AM - 5:00PM.      Sincerely,  [facility]

## 2025-04-16 ENCOUNTER — TELEPHONE (OUTPATIENT)
Dept: SLEEP MEDICINE | Facility: MEDICAL CENTER | Age: 65
End: 2025-04-16
Payer: COMMERCIAL

## 2025-04-16 NOTE — TELEPHONE ENCOUNTER
KITTY 9/10/24. Jose J has not scheduled his CT-lung cancer screening yet, per my Epic review.  He has [employer], so he may be planning to schedule outside [facility].   sent to Coby to find out if he has scheduled his imaging outside of [facility] and if so, when and where he plans to go for imaging.  If not scheduled, then requested Coby  remind him to schedule. -Dr. Apurva Leon

## 2025-04-23 ENCOUNTER — HOSPITAL ENCOUNTER (OUTPATIENT)
Dept: LAB | Facility: MEDICAL CENTER | Age: 65
End: 2025-04-23
Attending: STUDENT IN AN ORGANIZED HEALTH CARE EDUCATION/TRAINING PROGRAM
Payer: COMMERCIAL

## 2025-04-23 DIAGNOSIS — E78.2 MIXED HYPERLIPIDEMIA: ICD-10-CM

## 2025-04-23 DIAGNOSIS — E11.8 TYPE II DIABETES MELLITUS WITH COMPLICATION (HCC): ICD-10-CM

## 2025-04-23 DIAGNOSIS — I10 ESSENTIAL HYPERTENSION: ICD-10-CM

## 2025-04-23 LAB
ALBUMIN SERPL BCP-MCNC: 4.7 G/DL (ref 3.2–4.9)
ALBUMIN/GLOB SERPL: 1.7 G/DL
ALP SERPL-CCNC: 67 U/L (ref 30–99)
ALT SERPL-CCNC: 49 U/L (ref 2–50)
ANION GAP SERPL CALC-SCNC: 12 MMOL/L (ref 7–16)
AST SERPL-CCNC: 29 U/L (ref 12–45)
BASOPHILS # BLD AUTO: 0.8 % (ref 0–1.8)
BASOPHILS # BLD: 0.05 K/UL (ref 0–0.12)
BILIRUB SERPL-MCNC: 0.6 MG/DL (ref 0.1–1.5)
BUN SERPL-MCNC: 19 MG/DL (ref 8–22)
CALCIUM ALBUM COR SERPL-MCNC: 9.4 MG/DL (ref 8.5–10.5)
CALCIUM SERPL-MCNC: 10 MG/DL (ref 8.5–10.5)
CHLORIDE SERPL-SCNC: 104 MMOL/L (ref 96–112)
CHOLEST SERPL-MCNC: 117 MG/DL (ref 100–199)
CO2 SERPL-SCNC: 25 MMOL/L (ref 20–33)
CREAT SERPL-MCNC: 0.87 MG/DL (ref 0.5–1.4)
CREAT UR-MCNC: 70.3 MG/DL
EOSINOPHIL # BLD AUTO: 0.16 K/UL (ref 0–0.51)
EOSINOPHIL NFR BLD: 2.6 % (ref 0–6.9)
ERYTHROCYTE [DISTWIDTH] IN BLOOD BY AUTOMATED COUNT: 43 FL (ref 35.9–50)
EST. AVERAGE GLUCOSE BLD GHB EST-MCNC: 157 MG/DL
GFR SERPLBLD CREATININE-BSD FMLA CKD-EPI: 97 ML/MIN/1.73 M 2
GLOBULIN SER CALC-MCNC: 2.8 G/DL (ref 1.9–3.5)
GLUCOSE SERPL-MCNC: 155 MG/DL (ref 65–99)
HBA1C MFR BLD: 7.1 % (ref 4–5.6)
HCT VFR BLD AUTO: 53 % (ref 42–52)
HDLC SERPL-MCNC: 34 MG/DL
HGB BLD-MCNC: 17.6 G/DL (ref 14–18)
IMM GRANULOCYTES # BLD AUTO: 0.01 K/UL (ref 0–0.11)
IMM GRANULOCYTES NFR BLD AUTO: 0.2 % (ref 0–0.9)
LDLC SERPL CALC-MCNC: 41 MG/DL
LYMPHOCYTES # BLD AUTO: 2.47 K/UL (ref 1–4.8)
LYMPHOCYTES NFR BLD: 40.7 % (ref 22–41)
MCH RBC QN AUTO: 30.6 PG (ref 27–33)
MCHC RBC AUTO-ENTMCNC: 33.2 G/DL (ref 32.3–36.5)
MCV RBC AUTO: 92 FL (ref 81.4–97.8)
MICROALBUMIN UR-MCNC: 1.4 MG/DL
MICROALBUMIN/CREAT UR: 20 MG/G (ref 0–30)
MONOCYTES # BLD AUTO: 0.57 K/UL (ref 0–0.85)
MONOCYTES NFR BLD AUTO: 9.4 % (ref 0–13.4)
NEUTROPHILS # BLD AUTO: 2.81 K/UL (ref 1.82–7.42)
NEUTROPHILS NFR BLD: 46.3 % (ref 44–72)
NRBC # BLD AUTO: 0 K/UL
NRBC BLD-RTO: 0 /100 WBC (ref 0–0.2)
PLATELET # BLD AUTO: 232 K/UL (ref 164–446)
PMV BLD AUTO: 9.8 FL (ref 9–12.9)
POTASSIUM SERPL-SCNC: 5.2 MMOL/L (ref 3.6–5.5)
PROT SERPL-MCNC: 7.5 G/DL (ref 6–8.2)
RBC # BLD AUTO: 5.76 M/UL (ref 4.7–6.1)
SODIUM SERPL-SCNC: 141 MMOL/L (ref 135–145)
TRIGL SERPL-MCNC: 211 MG/DL (ref 0–149)
WBC # BLD AUTO: 6.1 K/UL (ref 4.8–10.8)

## 2025-04-23 PROCEDURE — 80053 COMPREHEN METABOLIC PANEL: CPT

## 2025-04-23 PROCEDURE — 82043 UR ALBUMIN QUANTITATIVE: CPT

## 2025-04-23 PROCEDURE — 83036 HEMOGLOBIN GLYCOSYLATED A1C: CPT

## 2025-04-23 PROCEDURE — 85025 COMPLETE CBC W/AUTO DIFF WBC: CPT

## 2025-04-23 PROCEDURE — 82570 ASSAY OF URINE CREATININE: CPT

## 2025-04-23 PROCEDURE — 80061 LIPID PANEL: CPT

## 2025-04-23 PROCEDURE — 36415 COLL VENOUS BLD VENIPUNCTURE: CPT

## 2025-04-24 ENCOUNTER — RESULTS FOLLOW-UP (OUTPATIENT)
Dept: MEDICAL GROUP | Facility: PHYSICIAN GROUP | Age: 65
End: 2025-04-24
Payer: COMMERCIAL

## 2025-04-24 DIAGNOSIS — E11.8 TYPE II DIABETES MELLITUS WITH COMPLICATION (HCC): ICD-10-CM

## 2025-04-28 ENCOUNTER — OFFICE VISIT (OUTPATIENT)
Dept: MEDICAL GROUP | Facility: PHYSICIAN GROUP | Age: 65
End: 2025-04-28
Payer: COMMERCIAL

## 2025-04-28 VITALS
DIASTOLIC BLOOD PRESSURE: 84 MMHG | SYSTOLIC BLOOD PRESSURE: 138 MMHG | OXYGEN SATURATION: 96 % | WEIGHT: 233 LBS | BODY MASS INDEX: 32.62 KG/M2 | TEMPERATURE: 98.1 F | HEART RATE: 72 BPM | HEIGHT: 71 IN

## 2025-04-28 DIAGNOSIS — R19.00 ABDOMINAL MASS, UNSPECIFIED ABDOMINAL LOCATION: ICD-10-CM

## 2025-04-28 DIAGNOSIS — E11.8 TYPE II DIABETES MELLITUS WITH COMPLICATION (HCC): ICD-10-CM

## 2025-04-28 ASSESSMENT — FIBROSIS 4 INDEX: FIB4 SCORE: 1.11

## 2025-04-28 ASSESSMENT — ENCOUNTER SYMPTOMS
SHORTNESS OF BREATH: 0
PALPITATIONS: 0
CHILLS: 0
FEVER: 0

## 2025-04-28 NOTE — PROGRESS NOTES
Subjective:   Verbal consent was acquired by the patient to use mob[employer] ambient listening note generation during this visit Yes     CC: Lab review    History of Present Illness  Mr. Ivory is a pleasant 63 yo who presents for evaluation of diabetes mellitus, sleep apnea, and an abdominal mass.    The chief complaint is an increase in hemoglobin A1c levels. Accompanied by a history of dietary modifications, efforts to reduce carbohydrate intake are reported, although occasional indulgences in desserts and burgers occur. Despite these efforts, an increase in cholesterol levels is noted. A recent resumption of a low-carbohydrate diet is mentioned, with a reported blood glucose level of 142 this morning, attributed to consuming a coffee drink post-dinner the previous night. A history of monitoring blood glucose levels in the morning and pre-meal, with insulin administration if readings exceeded 150, is provided. Metformin has not been previously prescribed.    An unusual mass in the abdomen was identified during a recent shower, with uncertainty about its nature and concern.    Sleep apnea is present, with plans to reengage with a sleep study on 05/20/2025.    Current anticoagulant therapy is noted, with multiple bruises observed on the abdomen.    Patient Active Problem List    Diagnosis Date Noted    Status post placement of stent in right coronary artery - 3/2024 NSTEMI 03/05/2024    Toenail deformity 04/14/2022    Vasculogenic erectile dysfunction 07/14/2020    Essential hypertension 07/14/2020    ROSALINO (obstructive sleep apnea) 04/25/2019    Type II diabetes mellitus with complication (HCC) 04/22/2018    Obesity (BMI 30-39.9) 12/12/2016    Cigar smoker 08/22/2016    Coronary artery disease involving native coronary artery of native heart without angina pectoris 07/26/2016    HLD (hyperlipidemia) 07/26/2016    History of ST elevation myocardial infarction (STEMI) 07/07/2016         Health Maintenance:  "Completed    ROS:  Review of Systems   Constitutional:  Negative for chills and fever.   Respiratory:  Negative for shortness of breath.    Cardiovascular:  Negative for chest pain and palpitations.       Objective:     Exam:  /84 (BP Location: Right arm, Patient Position: Sitting, BP Cuff Size: Adult)   Pulse 72   Temp 36.7 °C (98.1 °F) (Temporal)   Ht 1.803 m (5' 11\")   Wt 106 kg (233 lb)   SpO2 96%   BMI 32.50 kg/m²  Body mass index is 32.5 kg/m².    Physical Exam  Constitutional:       Appearance: Normal appearance.   Eyes:      Extraocular Movements: Extraocular movements intact.   Abdominal:          Comments: A mobile less than 1 cm mass was noted in the upper abdomen laterally above the umbilicus   Neurological:      Mental Status: He is alert.   Psychiatric:         Behavior: Behavior normal.             Labs:    Latest Reference Range & Units 04/23/25 06:51   WBC 4.8 - 10.8 K/uL 6.1   RBC 4.70 - 6.10 M/uL 5.76   Hemoglobin 14.0 - 18.0 g/dL 17.6   Hematocrit 42.0 - 52.0 % 53.0 (H)   MCV 81.4 - 97.8 fL 92.0   MCH 27.0 - 33.0 pg 30.6   MCHC 32.3 - 36.5 g/dL 33.2   RDW 35.9 - 50.0 fL 43.0   Platelet Count 164 - 446 K/uL 232   MPV 9.0 - 12.9 fL 9.8   Neutrophils-Polys 44.00 - 72.00 % 46.30   Neutrophils (Absolute) 1.82 - 7.42 K/uL 2.81   Lymphocytes 22.00 - 41.00 % 40.70   Lymphs (Absolute) 1.00 - 4.80 K/uL 2.47   Monocytes 0.00 - 13.40 % 9.40   Monos (Absolute) 0.00 - 0.85 K/uL 0.57   Eosinophils 0.00 - 6.90 % 2.60   Eos (Absolute) 0.00 - 0.51 K/uL 0.16   Basophils 0.00 - 1.80 % 0.80   Baso (Absolute) 0.00 - 0.12 K/uL 0.05   Immature Granulocytes 0.00 - 0.90 % 0.20   Immature Granulocytes (abs) 0.00 - 0.11 K/uL 0.01   Nucleated RBC 0.00 - 0.20 /100 WBC 0.00   NRBC (Absolute) K/uL 0.00   Sodium 135 - 145 mmol/L 141   Potassium 3.6 - 5.5 mmol/L 5.2   Chloride 96 - 112 mmol/L 104   Co2 20 - 33 mmol/L 25   Anion Gap 7.0 - 16.0  12.0   Glucose 65 - 99 mg/dL 155 (H)   Bun 8 - 22 mg/dL 19   Creatinine " 0.50 - 1.40 mg/dL 0.87   GFR (CKD-EPI) >60 mL/min/1.73 m 2 97   Calcium 8.5 - 10.5 mg/dL 10.0   Correct Calcium 8.5 - 10.5 mg/dL 9.4   AST(SGOT) 12 - 45 U/L 29   ALT(SGPT) 2 - 50 U/L 49   Alkaline Phosphatase 30 - 99 U/L 67   Total Bilirubin 0.1 - 1.5 mg/dL 0.6   Albumin 3.2 - 4.9 g/dL 4.7   Total Protein 6.0 - 8.2 g/dL 7.5   Globulin 1.9 - 3.5 g/dL 2.8   A-G Ratio g/dL 1.7   Glycohemoglobin 4.0 - 5.6 % 7.1 (H)   Estim. Avg Glu mg/dL 157   Cholesterol,Tot 100 - 199 mg/dL 117   Triglycerides 0 - 149 mg/dL 211 (H)   HDL >=40 mg/dL 34 !   LDL <100 mg/dL 41   (H): Data is abnormally high  !: Data is abnormal    Assessment & Plan:     64 y.o. male with the following -     1. Type II diabetes mellitus with complication (HCC)  Chronic, uncontrolled.  A1c was 6.1%, this has increased from 6.4%.  Patient is currently on Jardiance 25 mg daily.  After shared decision making we will temporarily start patient on metformin 500 mg daily.  Side effects of the medication were discussed with the patient in detail.  Patient was advised to take the medication at bedtime.  Patient to continue to watch his diet and exercise.  Patient is up-to-date on retinal screening, monofilament exam, and urine microalbumin, and lipid panel.  - metFORMIN (GLUCOPHAGE) 500 MG Tab; Take 1 Tablet by mouth every day.  Dispense: 100 Tablet; Refill: 0    2. Abdominal mass, unspecified abdominal location  Acute, ongoing.  Patient reports noting a small mass in his mid abdomen in the shower.  On exam a mobile less than 1 cm mass was noted in the upper abdomen laterally above the umbilicus.  This could be a lipoma versus a ventral hernia.  Ultrasound has been ordered.  - US-ABDOMEN LTD (SOFT TISSUE); Future            Return in about 3 months (around 7/28/2025), or if symptoms worsen or fail to improve.    Please note that this dictation was created using voice recognition software. I have made every reasonable attempt to correct obvious errors, but I expect  that there are errors of grammar and possibly content that I did not discover before finalizing the note.

## 2025-05-06 ENCOUNTER — OFFICE VISIT (OUTPATIENT)
Dept: CARDIOLOGY | Facility: MEDICAL CENTER | Age: 65
End: 2025-05-06
Attending: INTERNAL MEDICINE
Payer: COMMERCIAL

## 2025-05-06 VITALS
RESPIRATION RATE: 16 BRPM | DIASTOLIC BLOOD PRESSURE: 90 MMHG | WEIGHT: 229 LBS | SYSTOLIC BLOOD PRESSURE: 118 MMHG | BODY MASS INDEX: 32.06 KG/M2 | HEIGHT: 71 IN | OXYGEN SATURATION: 94 % | HEART RATE: 80 BPM

## 2025-05-06 DIAGNOSIS — I10 ESSENTIAL HYPERTENSION: ICD-10-CM

## 2025-05-06 DIAGNOSIS — E78.2 MIXED HYPERLIPIDEMIA: ICD-10-CM

## 2025-05-06 DIAGNOSIS — G47.33 OSA (OBSTRUCTIVE SLEEP APNEA): ICD-10-CM

## 2025-05-06 DIAGNOSIS — Z95.5 STATUS POST PLACEMENT OF STENT IN RIGHT CORONARY ARTERY: ICD-10-CM

## 2025-05-06 DIAGNOSIS — E11.8 TYPE II DIABETES MELLITUS WITH COMPLICATION (HCC): ICD-10-CM

## 2025-05-06 DIAGNOSIS — I25.10 CORONARY ARTERY DISEASE INVOLVING NATIVE CORONARY ARTERY OF NATIVE HEART WITHOUT ANGINA PECTORIS: ICD-10-CM

## 2025-05-06 PROCEDURE — 99212 OFFICE O/P EST SF 10 MIN: CPT | Performed by: INTERNAL MEDICINE

## 2025-05-06 PROCEDURE — 99214 OFFICE O/P EST MOD 30 MIN: CPT | Performed by: INTERNAL MEDICINE

## 2025-05-06 ASSESSMENT — FIBROSIS 4 INDEX: FIB4 SCORE: 1.11

## 2025-05-06 NOTE — PROGRESS NOTES
Subjective:   Yves Ivory is a 64 y .o. male who presents today for follow-up of CAD hypertension and hyperlipidemia.    Hx of anxiety, HLD, obesity, HTN, anxiety, depression, smoking hx, and now CAD s/p STEMI with DENISSE to OM, VF arrest.  Subsequently inferior AMI status post DENISSE x 2 to the RCA with preserved LV function and recovered symptoms.      Since last visit he has been doing well no symptoms increasing his physical activity is summer arrives.  Takes medication as directed battling his diabetes with recent changes in his medications which is improving.  Previously treated for sleep apnea he is not seeking alternative treatments to CPAP.    Past Medical History:   Diagnosis Date    Abdominal gas pain 12/12/2016    Anxiety     Biliary colic 01/10/2020    Bronchitis     CAD (coronary artery disease)     STEMI-S/P DENISSE to OM    Chickenpox     Coronary heart disease     Cough 12/12/2016    Depression     Former smoker 04/25/2019    [place] measles     History of nocturia 04/14/2022    Reports was started on Flomax last year by prior PCP for increased night time urinary urgency. His lisinopril was also increased for better BP management; states he stopped taking flomax because he noticed it dropped his BP too low. He has noticed some improvement in nocturia since increasing lisinopril.   He will let me know if this becomes a problem for him again and we will revisit treatment.    Hyperlipidemia     Hypertension     Influenza     Nasal congestion 10/12/2022    Reports having nasal congestion at night related to allergies for years. He also has hx of ROSALINO but unable to tolerate BIPAP mask. He has been using Afrin nasal spray nightly for some years now. He is concerned about long term use of nasal spray and would like to see allergist.     NSTEMI (non-ST elevated myocardial infarction) ([facility]) 03/05/2024    Obesity     Preventative health care 12/12/2016    Sleep apnea     Status post placement of stent in right  coronary artery - 3/2024 NSTEMI 03/05/2024    Ventricular tachycardia (HCC)     post STEMI     VT (ventricular tachycardia) (HCC) 07/26/2016    Whooping cough      Past Surgical History:   Procedure Laterality Date    ANGIOPLASTY      APPENDECTOMY CHILD      TONSILLECTOMY      ZZZ CARDIAC CATH       Family History   Problem Relation Age of Onset    Cancer Mother         breast cancer    Heart Disease Father 38        heart transplant, 3 MI's, multiple bypass    Stroke Maternal Grandmother     Colorectal Cancer Neg Hx      Social History     Tobacco Use   Smoking Status Some Days    Types: Cigars   Smokeless Tobacco Former    Types: Chew    Quit date: 2016   Tobacco Comments    1 - 2 cigars on the weeknds      Allergies   Allergen Reactions    Brilinta [Ticagrelor] Shortness of Breath     Med put patient in ER twice     Outpatient Encounter Medications as of 5/6/2025   Medication Sig Dispense Refill    metFORMIN (GLUCOPHAGE) 500 MG Tab Take 1 Tablet by mouth every day. 100 Tablet 3    cyclobenzaprine (FLEXERIL) 5 mg tablet Take 1-2 Tablets by mouth 3 times a day as needed for Moderate Pain. 60 Tablet 0    lisinopril (PRINIVIL) 10 MG Tab Take 1 Tablet by mouth 2 times a day. 180 Tablet 0    ezetimibe (ZETIA) 10 MG Tab TAKE 1 TABLET BY MOUTH EVERY DAY 90 Tablet 3    rosuvastatin (CRESTOR) 40 MG tablet TAKE 1 TABLET BY MOUTH EVERY DAY 90 Tablet 3    JARDIANCE 25 MG Tab TAKE 1 TABLET BY MOUTH EVERY DAY 90 Tablet 3    prasugrel (EFFIENT) 10 MG Tab TAKE 1 TABLET BY MOUTH EVERY DAY 90 Tablet 3    metoprolol SR (TOPROL XL) 50 MG TABLET SR 24 HR TAKE 1 TABLET BY MOUTH EVERY DAY IN THE EVENING 90 Tablet 3    colchicine (COLCRYS) 0.6 MG Tab Take 1 Tablet by mouth every day. 90 Tablet 3    Melatonin 10 MG Chew Tab Chew as needed (alternates with tylenol PM).      diphenhydrAMINE-APAP, sleep, (TYLENOL PM EXTRA STRENGTH)  MG Tab Take 2 Tablets by mouth at bedtime. Alternates with regular tylenol.      acetaminophen (TYLENOL)  "500 MG Tab Take 500 mg by mouth every evening.      NON SPECIFIED Allergy shots every month with Dr. Rae      Azelastine HCl (ASTEPRO NA) Administer 2 Sprays into each nostril at bedtime.      Calcium Carbonate Antacid (SHAMA-SELTZER ANTACID PO) Take 1 Tablet by mouth at bedtime.      Lancets Use one lancet to test blood sugar three times daily before meals. 100 Each 0    Insulin Pen Needle 32 G x 4 mm Use one pen needle in pen device to inject insulin three times daily. 100 Each 0    [DISCONTINUED] aspirin 81 MG EC tablet Take 1 Tablet by mouth every day. 90 Tablet 3    Blood Glucose Monitoring Suppl (BLOOD GLUCOSE MONITOR SYSTEM) w/Device Kit Test blood sugar as recommended by provider. 1 Kit 0    glucose blood strip Use one strip to test blood sugar three times daily before meals. 100 Strip 0    Alcohol Swabs Wipe site with prep pad prior to injection. 100 Each 0     No facility-administered encounter medications on file as of 5/6/2025.     Review of Systems   All other systems reviewed and are negative.       Objective:   BP (!) 118/90 (BP Location: Left arm, Patient Position: Sitting)   Pulse 80   Resp 16   Ht 1.803 m (5' 11\")   Wt 104 kg (229 lb)   SpO2 94%   BMI 31.94 kg/m²     Physical Exam  Vitals reviewed.   Constitutional:       General: He is not in acute distress.     Appearance: He is well-developed. He is not diaphoretic.   HENT:      Head: Normocephalic and atraumatic.      Right Ear: External ear normal.      Left Ear: External ear normal.   Eyes:      General: No scleral icterus.        Right eye: No discharge.         Left eye: No discharge.      Conjunctiva/sclera: Conjunctivae normal.      Pupils: Pupils are equal, round, and reactive to light.   Neck:      Thyroid: No thyromegaly.      Vascular: No JVD.      Trachea: No tracheal deviation.   Cardiovascular:      Rate and Rhythm: Normal rate and regular rhythm.      Chest Wall: PMI is not displaced.      Pulses:           Carotid pulses " are 2+ on the right side and 2+ on the left side.       Radial pulses are 2+ on the left side.        Popliteal pulses are 2+ on the right side and 2+ on the left side.        Dorsalis pedis pulses are 2+ on the right side and 2+ on the left side.        Posterior tibial pulses are 2+ on the right side and 2+ on the left side.      Heart sounds: No murmur heard.     No friction rub. No gallop.   Pulmonary:      Effort: Pulmonary effort is normal. No respiratory distress.      Breath sounds: Normal breath sounds. No wheezing or rales.   Chest:      Chest wall: No tenderness.   Abdominal:      General: Bowel sounds are normal. There is no distension.      Palpations: Abdomen is soft.      Tenderness: There is no abdominal tenderness.   Musculoskeletal:         General: No tenderness or deformity. Normal range of motion.      Cervical back: Normal range of motion and neck supple.   Skin:     General: Skin is warm and dry.      Coloration: Skin is not pale.      Findings: No erythema or rash.   Neurological:      Mental Status: He is alert and oriented to person, place, and time.      Cranial Nerves: No cranial nerve deficit (cranial nerves II through XII grossly intact).      Coordination: Coordination normal.   Psychiatric:         Behavior: Behavior normal.         Thought Content: Thought content normal.       7/7/16 ANGIOGRAM POSTPROCEDURE DIAGNOSES:  1.  Coronary artery disease including occluded proximal obtuse marginal    branch, additional nonobstructive mid left anterior descending artery    stenosis.  2.  Successful thrombectomy/PTCA/stent placement of the proximal to mid obtuse   marginal branch with 3.0x32-mm Promus PREMIER drug-eluting stent.  3.  Angio-Seal closure.    7/8/16 ECHO CONCLUSIONS  Left ventricular ejection fraction is visually estimated to be 55%,   inferior hypokinesis.  Grade II-III diastolic dysfunction.  Mild concentric left ventricular   hypertrophy.  Dilated inferior vena cava with  inspiratory collapse.  No significant valve disease or flow abnormalities.   No prior study is available for comparison.     Lab Results   Component Value Date/Time    WBC 6.1 04/23/2025 06:51 AM    RBC 5.76 04/23/2025 06:51 AM    HEMOGLOBIN 17.6 04/23/2025 06:51 AM    HEMATOCRIT 53.0 (H) 04/23/2025 06:51 AM    MCV 92.0 04/23/2025 06:51 AM    MCH 30.6 04/23/2025 06:51 AM    MCHC 33.2 04/23/2025 06:51 AM    MPV 9.8 04/23/2025 06:51 AM      Lab Results   Component Value Date/Time    CHOLSTRLTOT 117 04/23/2025 06:51 AM    LDL 41 04/23/2025 06:51 AM    HDL 34 (A) 04/23/2025 06:51 AM    TRIGLYCERIDE 211 (H) 04/23/2025 06:51 AM       Lab Results   Component Value Date/Time    SODIUM 141 04/23/2025 06:51 AM    POTASSIUM 5.2 04/23/2025 06:51 AM    CHLORIDE 104 04/23/2025 06:51 AM    CO2 25 04/23/2025 06:51 AM    GLUCOSE 155 (H) 04/23/2025 06:51 AM    BUN 19 04/23/2025 06:51 AM    CREATININE 0.87 04/23/2025 06:51 AM     Lab Results   Component Value Date/Time    ALKPHOSPHAT 67 04/23/2025 06:51 AM    ASTSGOT 29 04/23/2025 06:51 AM    ALTSGPT 49 04/23/2025 06:51 AM    TBILIRUBIN 0.6 04/23/2025 06:51 AM      Lab Results   Component Value Date/Time    BNPBTYPENAT 6 10/06/2016 02:20 PM        Imaging reviewed      Assessment:     1. Coronary artery disease involving native coronary artery of native heart without angina pectoris        2. Status post placement of stent in right coronary artery        3. Mixed hyperlipidemia        4. Essential hypertension        5. Type II diabetes mellitus with complication (HCC)        6. ROSALINO (obstructive sleep apnea)            Medical Decision Making:  Today's Assessment / Status / Plan:     Preserved EF after AMI with recurrent stent placement asymptomatic increasing activity.  We discussed increasing his lean muscle mass and cardiovascular activity lowering his body weight and I agree with treatment for his sleep apnea.  Other medical therapy is appropriate.  Continues on colchicine which  we have discussed again this visit.  Blood pressure is well-controlled.  Discussed long-term DAPT.  He takes Olimpia-Challis several times per week which contains aspirin.  He can discontinue his aspirin daily therapy.  We discussed Effient versus the approved longer-term antiplatelet therapies.  Currently he is having no issues with Effient and is affordable therefore we will continue with Effient monotherapy for the time being.  He will follow-up routinely.    () Today's E/M visit is associated with medical care services that serve as the continuing focal point for all needed health care services and/or with medical care services that  are part of ongoing care related to a patient's single, serious condition, or a complex condition: This includes  furnishing services to patients on an ongoing basis that result in care that is personalized  to the patient. The services result in a comprehensive, longitudinal, and continuous  relationship with the patient and involve delivery of team-based care that is accessible, coordinated with other practitioners and providers, and integrated with the broader health  care landscape.     Thank you for this interesting consultation. It was my pleasure to see Yves Ivory today.    Federico Bianchi MD, FACC, [facility]  Division of Interventional Cardiology  [facility]

## 2025-05-15 ENCOUNTER — APPOINTMENT (OUTPATIENT)
Dept: RADIOLOGY | Facility: MEDICAL CENTER | Age: 65
End: 2025-05-15
Attending: STUDENT IN AN ORGANIZED HEALTH CARE EDUCATION/TRAINING PROGRAM
Payer: COMMERCIAL

## 2025-05-20 ENCOUNTER — OFFICE VISIT (OUTPATIENT)
Dept: SLEEP MEDICINE | Facility: MEDICAL CENTER | Age: 65
End: 2025-05-20
Payer: COMMERCIAL

## 2025-05-20 VITALS
DIASTOLIC BLOOD PRESSURE: 82 MMHG | WEIGHT: 230 LBS | SYSTOLIC BLOOD PRESSURE: 124 MMHG | HEART RATE: 101 BPM | HEIGHT: 71 IN | BODY MASS INDEX: 32.2 KG/M2 | RESPIRATION RATE: 16 BRPM | OXYGEN SATURATION: 93 %

## 2025-05-20 DIAGNOSIS — G47.39 COMPLEX SLEEP APNEA SYNDROME: Primary | ICD-10-CM

## 2025-05-20 DIAGNOSIS — Z78.9 INTOLERANCE OF CONTINUOUS POSITIVE AIRWAY PRESSURE (CPAP) VENTILATION: ICD-10-CM

## 2025-05-20 PROCEDURE — 99204 OFFICE O/P NEW MOD 45 MIN: CPT

## 2025-05-20 PROCEDURE — 99214 OFFICE O/P EST MOD 30 MIN: CPT

## 2025-05-20 ASSESSMENT — FIBROSIS 4 INDEX: FIB4 SCORE: 1.11

## 2025-05-20 NOTE — PROGRESS NOTES
[facility] Consult Note     Date: 2025 / Time: 3:14 PM      Thank you for requesting a sleep medicine consultation on Yves Ivory at the sleep center. Presents today with the chief complaints of previous diagnosis of ROSALINO. He is referred by PCP for evaluation and treatment of sleep disorder breathing.       HISTORY OF PRESENT ILLNESS:     Yvse Ivory is a 64 y.o. male with focused history of elevated BMI, insomnia, ROSALNIO, previous smoker, MI.  Jose J presents to Sleep Clinic for evaluation and treatment of sleep disorder breathing.     He quit smoking 6-7 years ago.       Patient was diagnosed with CSA . At that time, patient was started on BiPAP.    Patient overall had difficulty tolerating BiPAP and reported ongoing sinus issues.  Patient is currently being seen every month by an allergist getting injections.  Patient was a previous patient last seen in 2019 who presents today to discuss interrupted sleep.  Patient snores, has episodes of waking up gasping, dry mouth in the morning, does not feel refreshed upon awakening.  Patient is interested in discussing alternatives to BiPAP.  Patient had a heart attack last year and asked about relation to stress on his body from untreated sleep apnea.  Patient was also told by his dentist that he continues to have teeth grinding issues which have destroyed previous filling and the patient should be fitted for a mouthguard.    Previous sleep testin19 - PSG The overall AHI was 48.8, with a REM AHI of 0.00, and a supine AHI of 92.12. 61% of the respiratory events were central apneas. The mean SaO2 was 89.0% for the diagnostic portion of the study, with a minimum SaO2 of 61.0%. CPAP was tried from 5 cm H2O to 11 cm H2O. No definitive pressure can be extrapolated from the titration, I recommend dedicated CPAP/BiPAP titration.  19 - PSG titration Incomplete bilevel titration to a best pressure of 20/16 cm water with a  "resultant AHI of 7.5, a matt saturation of 92%, mean saturation 94%, and the achievement of both supine and REM sleep. Recommend bilevel 24/16 cmH2O    Pertinent medications:  Tylenol PM if he wakes up before midnight and can't fall asleep     As per supplemental questionnaire to be scanned or imported into chart:    [place] Sleepiness Score: 8    Sleep Schedule  Bedtime: Weekday 8PM Weekend 9-10PM  Wake time: Weekday 5AM Weekend 530-6AM  Sleep-onset latency: varies  Awakenings from sleep: 4  Difficulty falling back asleep: sometimes   Bedroom partner: yes  Naps: yes, on weekends    DAYTIME SYMPTOMS:   Excessive daytime sleepiness: sometimes  Daytime fatigue: sometimes  Difficulty concentrating: age related   Memory problems: age related  Irritability:yes  Work/school performance issues: no  Sleepiness with driving: no  Caffeine/stimulant use: Yes, How Many? 2 coffee, 2 tea, 1 caffeine soda   Alcohol use:Yes, How Many? Drinks on weekends      SLEEP RELATED SYMPTOMS  Snoring: yes  Witnessed apnea or gasping/choking: Yes  Dry mouth or mouth breathing: yes  Sweating: no  Teeth grinding/biting: yes  Morning headaches: no  Refreshed Upon Awakening: no     SLEEP RELATED BEHAVIORS:  Parasomnias (walking, talking, eating, violence): No   Leg kicking: yes  Restless legs - \"urge to move\": not as much any more, in the past   Nightmares: no Recurrent: No   Dream enactment: No      NARCOLEPSY:  Cataplexy: No   Sleep paralysis: No   Sleep attacks: No   Hypnagogic/hypnopompic hallucinations: No     Occupation:      MEDICAL HISTORY  Past Medical History[1]     SURGICAL HISTORY  Past Surgical History[2]     FAMILY HISTORY  Family History   Problem Relation Age of Onset    Cancer Mother         breast cancer    Breast Cancer Mother     Hypertension Mother     Heart Disease Father 38        heart transplant, 3 MI's, multiple bypass    Cancer Father     Hyperlipidemia Father     Stroke Maternal Grandmother     " Colorectal Cancer Neg Hx        SOCIAL HISTORY  Social History[3]       CURRENT MEDICATIONS  Current Outpatient Medications   Medication Sig    metFORMIN (GLUCOPHAGE) 500 MG Tab Take 1 Tablet by mouth every day.    cyclobenzaprine (FLEXERIL) 5 mg tablet Take 1-2 Tablets by mouth 3 times a day as needed for Moderate Pain.    lisinopril (PRINIVIL) 10 MG Tab Take 1 Tablet by mouth 2 times a day.    ezetimibe (ZETIA) 10 MG Tab TAKE 1 TABLET BY MOUTH EVERY DAY    rosuvastatin (CRESTOR) 40 MG tablet TAKE 1 TABLET BY MOUTH EVERY DAY    JARDIANCE 25 MG Tab TAKE 1 TABLET BY MOUTH EVERY DAY    prasugrel (EFFIENT) 10 MG Tab TAKE 1 TABLET BY MOUTH EVERY DAY    metoprolol SR (TOPROL XL) 50 MG TABLET SR 24 HR TAKE 1 TABLET BY MOUTH EVERY DAY IN THE EVENING    colchicine (COLCRYS) 0.6 MG Tab Take 1 Tablet by mouth every day.    Lancets Use one lancet to test blood sugar three times daily before meals.    Melatonin 10 MG Chew Tab Chew as needed (alternates with tylenol PM).    Insulin Pen Needle 32 G x 4 mm Use one pen needle in pen device to inject insulin three times daily.    Blood Glucose Monitoring Suppl (BLOOD GLUCOSE MONITOR SYSTEM) w/Device Kit Test blood sugar as recommended by provider.    glucose blood strip Use one strip to test blood sugar three times daily before meals.    Alcohol Swabs Wipe site with prep pad prior to injection.    diphenhydrAMINE-APAP, sleep, (TYLENOL PM EXTRA STRENGTH)  MG Tab Take 2 Tablets by mouth at bedtime. Alternates with regular tylenol.    acetaminophen (TYLENOL) 500 MG Tab Take 500 mg by mouth every evening.    NON SPECIFIED Allergy shots every month with Dr. Rae    Azelastine HCl (ASTEPRO NA) Administer 2 Sprays into each nostril at bedtime.    Calcium Carbonate Antacid (SHAMA-SELTZER ANTACID PO) Take 1 Tablet by mouth at bedtime.       REVIEW OF SYSTEMS  Constitutional: Denies fevers, Denies weight changes  Ears/Nose/Throat/Mouth: Denies nasal congestion or sore throat  "  Cardiovascular: Denies chest pain  Respiratory: Denies shortness of breath, Denies cough  Gastrointestinal/Hepatic: Denies nausea, vomiting  Sleep: see HPI    Physical Examination:  Vitals/ General Appearance:   Encounter Vitals  Blood Pressure: 124/82  Pulse: (!) 101  Respiration: 16  Pulse Oximetry: 93 %  Weight: 104 kg (230 lb) (per patient)  Height: 180.3 cm (5' 11\") (per patient)  BMI (Calculated): 32.08    Pt. is alert and oriented to time, place and person. Cooperative and in no apparent distress.     Constitutional: Alert, no distress, well-groomed.  Skin: No rashes in visible areas.  Eye: Round. Conjunctiva clear, lids normal. No icterus.   ENT EXAM  Nasal septum deviation: No   Nasal turbinate hypertrophy: Left: Grade 3   Right: Grade 3  Nasal erythema: Yes  Oropharyngeal erythema Yes  Hard palate narrow: No   Hard palate high: Yes  Soft palate/uvula (Mallampati score): 3  Tongue Scalloping: No   Retrognathia: No   Micrognathia: No   Cardiovascular:normal S1 and S2 heart sounds, regular rate and rhythm  Pulmonary:Normal breath sounds, Clear to auscultation  Neurologic:Muscle tone normal, Awake, alert and oriented x 3  Extremities: No clubbing, cyanosis, or edema     Bicarb:   Lab Results   Component Value Date/Time    CO2 25 04/23/2025 0651    CO2 24 06/10/2024 0640    CO2 22 03/06/2024 0343     TSH:   Lab Results   Component Value Date/Time    TSHULTRASEN 1.100 06/09/2023 0630     CREATININE:   Lab Results   Component Value Date/Time    CREATININE 0.87 04/23/2025 0651     VIT D: No results found for: \"25HYDROXY\"  H/H:  Lab Results   Component Value Date/Time    HEMOGLOBIN 17.6 04/23/2025 06:51 AM         ASSESSMENT AND PLAN   1. Yves Ivory  has symptoms of Sleep Disordered Breathing which interfere with activities of daily living.   - [place] Sleepiness Scale: 8  - Patient reports snoring , daytime sleepiness, choking/gasping during sleep, fatigue, frequent nighttime awakenings, dry mouth, " and unrefreshed upon awakening.  - Patient has risk factors for ROSALINO including elevated BMI, thick neck circumference, crowded oropharynx, nasal turbinate hypertrophy, and The patient also has HTN which can be worsened by ROSALINO.   - The pathophysiology of ROSALINO and the increased risk of cardiovascular morbidity from untreated ROSALINO has been discussed with the patient.   - We have discussed diagnostic options including in-laboratory, attended polysomnography and home sleep testing. We have also discussed treatment options including airway pressurization, weight management, dental appliances, ENT surgery, Inspire implant, Remede implant, and behavioral modification.  Patient is very interested in pursuing implant if eligible.  Discussed repeating sleep study to evaluate severity as well as distribution of central versus obstructive events in which implant he may be eligible for. Discussed process of evaluation by EP vs ENT prior to moving forward with implant as well as submitting to insurance and other requirements related to severity, intolerance of PAP, BMI etc. Patient will research implant options.     Plan:  -  Order placed for PSG   - Follow up about 2 weeks after sleep study to discuss results and treatment options moving forward   - Advised to reach out via MyChart or by phone with any questions or concerns.   - The patient is urged to avoid supine sleep, weight gain and alcoholic beverages since all of these can worsen ROSALINO. If the patient feels sleepy while driving, advised must pull over for a break/nap, rather than persist on the road, in the interest of patient's own safety and that of others on the road.    2.  Regarding treatment of other past medical problems and general health maintenance,  patient is urged to follow up with PCP.      Please note portions of this record was created using voice recognition software. I have made every reasonable attempt to correct obvious errors, but I expect that there are  errors of grammar and possibly content I did not discover before finalizing the note.      Answers submitted by the patient for this visit:  Sleep Center Questionnaire (Submitted on 5/19/2025)  Year of your last physical exam: 2025  Results of exam: High sugar  Occupation :   Height: 5 11  Current weight: 230  6 months ago: Same  At age 22: 200  What is the reason for your visit today?: Sleep apnea  Name of person referring you to the Sleep Center: Doctor  Have you ever been hospitalized?: Yes  Reason, year, and hospital in which you were hospitalized:: Heart attack [facility] 2024  Have you ever had problems with anesthesia?: No  Have you experienced post-operative delirium?: No  Any complications with surgery?: No  What year did you receive your last Flu shot?: 2023  What year did you receive you last Pneumonia shot?: Unk  Have you had a TB skin test? If so, please list the year and result:: Unk  Please briefly describe your sleep problem and how old you were when it began.: Sleep apnea  How does this affect your daily life and activities? Please also rate how serious of a problem this is (1 = Not at all, 10 = Very Serious).: 7  Have you had any previous evaluations, examinations, or treatment for this sleep problem or any other problems with your sleep? If so, please describe the evaluation, treatment, and results.: Bipap  Have you used any medications (prescribed or otherwise) to help your sleep problem? If yes, include name, amount, frequency, and the prescribing physician.: No  If employed, what time do you usually start and end work?: 7am to 4pm  Do you ever change work shifts? If yes, describe how often (never, infrequently, regularly).: No  What time do you usually go to bed and wake up on: Weekdays? Weekends?: 8pm 5am  Do you have a regular bed partner?: Yes  How many minutes does it usually take to fall asleep at night after turning off the lights?: Depends  What do you ordinarily do just prior  to turning out the lights and attempting to go to sleep (e.g., reading, TV, baths, etc.)?: Tv  On average, how many times do you wake up during the night?: 4  On average, how many times do you wake up to use the bathroom?: 4  Do you often wake up too early in the morning and are unable to return to sleep?: Sometimes  On average, how many hours of sleep do you get per night?: 5 or 6  How do you usually awaken?  Alarm, spontaneously, or other?: Alarm  Is it difficult for you to awaken and get out of bed after sleeping? (Not at all, Sometimes, Very): Sometimes  Do you nap or return to bed after arising?: On weekends  Are you bothered by sleepiness during the day?: Yes  Do you feel that you get too much sleep at night?: No  Do you feel that you get too little sleep at night?: Yes  Do you usually feel tired during the day? If so, what do you attribute this to?: Old age  Do you find yourself falling asleep when you don't mean to? : Sometimes  If yes, how long does your sleep episode last?: A minute or two  Do you feel rested or refreshed after the sleep episode?: No  Have you ever suddenly fallen?: No  Have you ever experienced sudden body weakness?: No  Have you ever experienced weakness or paralysis upon going to sleep?: No  Have you ever experienced weakness or paralysis upon awakening from sleep?: No  Have you ever experienced seeing things or hearing voices/noises: That weren't real? On going to sleep? During the night? On awakening from sleep? During the day?: No  Do you have difficulty breathing at night? If yes, briefly describe.: Sinuses  How did you become aware of this, at what age did this first occur, and how many years has this occurred?: 10 years  Have you been told you snore while asleep? If so, does it disturb a bed partner (or someone in the same room), or someone in the next room?: Yes  Have you ever experienced doing something without being aware of the action? If yes, please describe.: No  Have you  "ever experienced upon lying in bed before sleep or on awakening from sleep: Restlessness of legs, \"nervous legs\", \"creeping crawling\" sensation of legs, or twitching of legs?: Yes  Have you ever been told that your arms or legs jerk or twitch while you are asleep? If yes, how many times per night does this occur?: Yes  At what age did this first occur, and how many years has this occurred?: 10 years  Do you know, or have you ever been told that you do any of the following while sleeping: talk, walk, grit teeth, wet the bed, wake up screaming or seemingly afraid, have disturbing dreams, have unusual movements, wake up with headaches, (males) have erections? If yes to any of these, please indicate how many times per week, age started, last occurrence, treatment received.: Yes         [1]   Past Medical History:  Diagnosis Date    Abdominal gas pain 12/12/2016    Allergy     Anxiety     Arthritis     Biliary colic 01/10/2020    Bronchitis     CAD (coronary artery disease)     STEMI-S/P DENISSE to OM    Chickenpox     Coronary heart disease     Cough 12/12/2016    Depression     Former smoker 04/25/2019    [place] measles     History of nocturia 04/14/2022    Reports was started on Flomax last year by prior PCP for increased night time urinary urgency. His lisinopril was also increased for better BP management; states he stopped taking flomax because he noticed it dropped his BP too low. He has noticed some improvement in nocturia since increasing lisinopril.   He will let me know if this becomes a problem for him again and we will revisit treatment.    Hyperlipidemia     Hypertension     Influenza     Nasal congestion 10/12/2022    Reports having nasal congestion at night related to allergies for years. He also has hx of ROSALINO but unable to tolerate BIPAP mask. He has been using Afrin nasal spray nightly for some years now. He is concerned about long term use of nasal spray and would like to see allergist.     NSTEMI (non-ST " elevated myocardial infarction) (HCC) 2024    Obesity     Preventative health care 2016    Sleep apnea     Status post placement of stent in right coronary artery - 3/2024 NSTEMI 2024    Ventricular tachycardia (HCC)     post STEMI     VT (ventricular tachycardia) ([facility]) 2016    Whooping cough    [2]   Past Surgical History:  Procedure Laterality Date    ANGIOPLASTY      APPENDECTOMY      APPENDECTOMY CHILD      TONSILLECTOMY      ZZZ CARDIAC CATH     [3]   Social History  Socioeconomic History    Marital status:     Highest education level: GED or equivalent   Tobacco Use    Smoking status: Former     Current packs/day: 0.00     Average packs/day: 1 pack/day for 40.0 years (40.0 ttl pk-yrs)     Types: Cigarettes     Quit date: 2016     Years since quittin.3    Smokeless tobacco: Former     Types: Chew     Quit date:     Tobacco comments:     Enjoy a cigar on the weekends   Vaping Use    Vaping status: Former    Substances: Nicotine   Substance and Sexual Activity    Alcohol use: Yes     Alcohol/week: 4.2 oz     Types: 7 Standard drinks or equivalent per week     Comment: on weekends    Drug use: Not Currently     Types: Marijuana    Sexual activity: Yes     Partners: Female     Social Drivers of Health     Financial Resource Strain: Low Risk  (2025)    Overall Financial Resource Strain (CARDIA)     Difficulty of Paying Living Expenses: Not hard at all   Food Insecurity: No Food Insecurity (2025)    Hunger Vital Sign     Worried About Running Out of Food in the Last Year: Never true     Ran Out of Food in the Last Year: Never true   Transportation Needs: No Transportation Needs (2025)    PRAPARE - Transportation     Lack of Transportation (Medical): No     Lack of Transportation (Non-Medical): No   Physical Activity: Inactive (2025)    Exercise Vital Sign     Days of Exercise per Week: 0 days     Minutes of Exercise per Session: 0 min   Stress: Stress Concern  Present (4/8/2025)    [facility] - Occupational Stress Questionnaire     Feeling of Stress : To some extent   Social Connections: Socially Isolated (4/8/2025)    Social Connection and Isolation Panel [NHANES]     Frequency of Communication with Friends and Family: Once a week     Frequency of Social Gatherings with Friends and Family: Once a week     Attends Mandaeism Services: Never     Active Member of Clubs or Organizations: No     Attends Club or Organization Meetings: Never     Marital Status:    Housing Stability: Low Risk  (4/8/2025)    Housing Stability Vital Sign     Unable to Pay for Housing in the Last Year: No     Number of Times Moved in the Last Year: 0     Homeless in the Last Year: No

## 2025-06-27 DIAGNOSIS — I10 ESSENTIAL HYPERTENSION: ICD-10-CM

## 2025-06-27 DIAGNOSIS — Z95.5 STATUS POST PLACEMENT OF STENT IN RIGHT CORONARY ARTERY: ICD-10-CM

## 2025-06-30 RX ORDER — LISINOPRIL 10 MG/1
10 TABLET ORAL 2 TIMES DAILY
Qty: 180 TABLET | Refills: 0 | Status: SHIPPED | OUTPATIENT
Start: 2025-06-30

## 2025-07-02 RX ORDER — COLCHICINE 0.6 MG/1
0.6 TABLET ORAL DAILY
Qty: 90 TABLET | Refills: 3 | Status: SHIPPED | OUTPATIENT
Start: 2025-07-02

## 2025-07-02 NOTE — TELEPHONE ENCOUNTER
Is the patient due for a refill? Yes    Was the patient seen the last 15 months? Yes    Date of last office visit: 05.06.2025    Does the patient have an upcoming appointment?  No   If yes, When?     Provider to refill:TW    Does the patient have MCFP Plus and need 100-day supply? (This applies to ALL medications) Patient does not have SCP

## 2025-07-15 ENCOUNTER — APPOINTMENT (OUTPATIENT)
Dept: SLEEP MEDICINE | Facility: MEDICAL CENTER | Age: 65
End: 2025-07-15
Payer: COMMERCIAL

## 2025-07-15 DIAGNOSIS — G47.39 COMPLEX SLEEP APNEA SYNDROME: ICD-10-CM

## 2025-07-15 DIAGNOSIS — Z78.9 INTOLERANCE OF CONTINUOUS POSITIVE AIRWAY PRESSURE (CPAP) VENTILATION: ICD-10-CM

## 2025-07-15 PROCEDURE — 95810 POLYSOM 6/> YRS 4/> PARAM: CPT | Mod: 26,52 | Performed by: STUDENT IN AN ORGANIZED HEALTH CARE EDUCATION/TRAINING PROGRAM

## 2025-07-17 NOTE — PROCEDURES
Patient: MICKEY CARVAJAL  ID: [id number] Date: 7/15/2025   MONTAGE: Standard  STUDY TYPE: Diagnostic  RECORDING TECHNIQUE:   After the scalp was prepared, gold plated electrodes were applied to the scalp according to the International 10-20 System. EEG (electroencephalogram) was continuously monitored from the O1-M2, O2-M1, C3-M2, C4-M1, F3-M2, and F4-M1. EOGs (electrooculograms) were monitored by electrodes placed at the left and right outer canthi. Chin EMG (electromyogram) was monitored by electrodes placed on the mentalis and sub-mentalis muscles. Nasal and oral airflow were monitored using a triple port thermocouple as well as oronasal pressure transducer. Respiratory effort was measured by inductive plethysmography technology employing abdominal and thoracic belts. Blood oxygen saturation and pulse were monitored by pulse oximetry. Heart rhythm was monitored by surface electrocardiogram. Leg EMG was studied using surface electrodes placed on left and right anterior tibialis. A microphone was used to monitor tracheal sounds and snoring. Body position was monitored and documented by technician observation.   SCORING CRITERIA:   A modification of the AASM manual for scoring of sleep and associated events was used. Obstructive apneas were scored by cessation of airflow for at least 10 seconds with continuing respiratory effort. Central apneas were scored by cessation of airflow for at least 10 seconds with no respiratory effort. Hypopneas were scored by a 30% or more reduction in airflow for at least 10 seconds accompanied by arterial oxygen desaturation of 3% or an arousal. For CMS (Medicare) patients, per AASM rule 1B, hypopneas are scored by 30% with mild reduction in airflow for at least 10 seconds accompanied by arterial saturation decreased at 4%.  Study start time was 09:17:08 PM. Diagnostic recording time was 3h 42.0m with a total sleep time of 0h 16.0m resulting in a sleep efficiency of 7.21%%. Sleep  latency from the start of the study was 102 minutes and the latency from sleep to REM was 00 minutes. In total,10 arousals were scored for an arousal index of 37.5.  Respiratory:  There were a total of 1 apneas consisting of 1 obstructive apneas, 0 mixed apneas, and 0 central apneas. A total of 8 hypopneas were scored. The apnea index was 3.75 per hour and the hypopnea index was 30.00 per hour resulting in an overall AHI of 33.75. AHI during REM was 0.0 and AHI while supine was 0.00.  Oximetry:  There was a mean oxygen saturation of 92.0%. The minimum oxygen saturation in NREM was 88.0 % and in REM was --%. The patient spent 0.1 minutes of TST with SaO2 <88%.  Cardiac:  The highest heart rate seen while awake was 90 BPM while the highest heart rate during sleep was 72 BPM with an average sleeping heart rate of 62 BPM.  Limb Movements:  There were a total of 16 PLMs during sleep which resulted in a PLMS index of 60.0. Of these, 6 were associated with arousals which resulted in a PLMS arousal index of 22.5.    Impression:  1.  Nondiagnostic study with only 16 minutes of sleep  2.  Given patient's difficulty sleeping he elected to leave the study early  3.  Potential first night effect leading to decreased sleep  4.  Patient has a known history of sleep apnea    Recommendations:  I recommend the patient should consider return for diagnostic polysomnography may consider sleep aid.    In some cases alternative treatment options may be proven effective in resolving sleep apnea. These options include upper airway surgery, the use of a dental orthotic, weight loss, or positional therapy. Clinical correlation is required. In general patients with sleep apnea are advised to avoid alcohol, sedatives and not to operate a motor vehicle while drowsy.  Untreated sleep apnea increases the risk for cardiovascular and neurovascular disease.